# Patient Record
Sex: FEMALE | Race: WHITE | NOT HISPANIC OR LATINO | Employment: FULL TIME | URBAN - METROPOLITAN AREA
[De-identification: names, ages, dates, MRNs, and addresses within clinical notes are randomized per-mention and may not be internally consistent; named-entity substitution may affect disease eponyms.]

---

## 2017-01-03 ENCOUNTER — GENERIC CONVERSION - ENCOUNTER (OUTPATIENT)
Dept: OTHER | Facility: OTHER | Age: 42
End: 2017-01-03

## 2017-01-06 ENCOUNTER — ALLSCRIPTS OFFICE VISIT (OUTPATIENT)
Dept: OTHER | Facility: OTHER | Age: 42
End: 2017-01-06

## 2017-01-09 ENCOUNTER — LAB CONVERSION - ENCOUNTER (OUTPATIENT)
Dept: OTHER | Facility: OTHER | Age: 42
End: 2017-01-09

## 2017-01-09 LAB — ENDOMETRIAL BIOPSY (HISTORICAL): NORMAL

## 2017-01-13 ENCOUNTER — GENERIC CONVERSION - ENCOUNTER (OUTPATIENT)
Dept: OTHER | Facility: OTHER | Age: 42
End: 2017-01-13

## 2017-02-21 ENCOUNTER — TRANSCRIBE ORDERS (OUTPATIENT)
Dept: ADMINISTRATIVE | Facility: HOSPITAL | Age: 42
End: 2017-02-21

## 2017-02-21 DIAGNOSIS — J34.89 ATROPHY OF NASAL TURBINATES: Primary | ICD-10-CM

## 2017-02-23 ENCOUNTER — GENERIC CONVERSION - ENCOUNTER (OUTPATIENT)
Dept: OTHER | Facility: OTHER | Age: 42
End: 2017-02-23

## 2017-02-24 ENCOUNTER — GENERIC CONVERSION - ENCOUNTER (OUTPATIENT)
Dept: OTHER | Facility: OTHER | Age: 42
End: 2017-02-24

## 2017-02-28 ENCOUNTER — HOSPITAL ENCOUNTER (OUTPATIENT)
Dept: CT IMAGING | Facility: HOSPITAL | Age: 42
Discharge: HOME | End: 2017-02-28
Payer: COMMERCIAL

## 2017-02-28 DIAGNOSIS — J34.89 ATROPHY OF NASAL TURBINATES: ICD-10-CM

## 2017-02-28 PROCEDURE — 70486 CT MAXILLOFACIAL W/O DYE: CPT

## 2017-03-01 DIAGNOSIS — N83.201 CYST OF RIGHT OVARY: ICD-10-CM

## 2017-03-07 ENCOUNTER — ALLSCRIPTS OFFICE VISIT (OUTPATIENT)
Dept: OTHER | Facility: OTHER | Age: 42
End: 2017-03-07

## 2017-03-21 ENCOUNTER — HOSPITAL ENCOUNTER (OUTPATIENT)
Dept: MAMMOGRAPHY | Facility: MEDICAL CENTER | Age: 42
Discharge: HOME | End: 2017-03-21
Payer: COMMERCIAL

## 2017-03-21 DIAGNOSIS — C50.312 MALIGNANT NEOPLASM OF LOWER-INNER QUADRANT OF LEFT FEMALE BREAST (HCC): ICD-10-CM

## 2017-03-21 DIAGNOSIS — Z12.31 ENCOUNTER FOR SCREENING MAMMOGRAM FOR MALIGNANT NEOPLASM OF BREAST: ICD-10-CM

## 2017-03-21 PROCEDURE — G0202 SCR MAMMO BI INCL CAD: HCPCS

## 2017-03-21 PROCEDURE — 77063 BREAST TOMOSYNTHESIS BI: CPT

## 2017-05-13 ENCOUNTER — HOSPITAL ENCOUNTER (OUTPATIENT)
Dept: ULTRASOUND IMAGING | Facility: HOSPITAL | Age: 42
Discharge: HOME | End: 2017-05-13
Attending: OBSTETRICS & GYNECOLOGY
Payer: COMMERCIAL

## 2017-05-13 DIAGNOSIS — N83.201 CYST OF RIGHT OVARY: ICD-10-CM

## 2017-05-13 PROCEDURE — 76856 US EXAM PELVIC COMPLETE: CPT

## 2017-05-13 PROCEDURE — 76830 TRANSVAGINAL US NON-OB: CPT

## 2017-05-30 ENCOUNTER — GENERIC CONVERSION - ENCOUNTER (OUTPATIENT)
Dept: OTHER | Facility: OTHER | Age: 42
End: 2017-05-30

## 2017-06-08 ENCOUNTER — ALLSCRIPTS OFFICE VISIT (OUTPATIENT)
Dept: OTHER | Facility: OTHER | Age: 42
End: 2017-06-08

## 2017-09-05 ENCOUNTER — ALLSCRIPTS OFFICE VISIT (OUTPATIENT)
Dept: OTHER | Facility: OTHER | Age: 42
End: 2017-09-05

## 2017-09-15 ENCOUNTER — APPOINTMENT (OUTPATIENT)
Dept: RADIATION ONCOLOGY | Facility: HOSPITAL | Age: 42
End: 2017-09-15
Attending: RADIOLOGY
Payer: COMMERCIAL

## 2017-09-15 ENCOUNTER — GENERIC CONVERSION - ENCOUNTER (OUTPATIENT)
Dept: OTHER | Facility: OTHER | Age: 42
End: 2017-09-15

## 2017-09-15 PROCEDURE — G0463 HOSPITAL OUTPT CLINIC VISIT: HCPCS | Performed by: RADIOLOGY

## 2017-09-15 PROCEDURE — 99214 OFFICE O/P EST MOD 30 MIN: CPT | Performed by: RADIOLOGY

## 2017-12-15 ENCOUNTER — ALLSCRIPTS OFFICE VISIT (OUTPATIENT)
Dept: OTHER | Facility: OTHER | Age: 42
End: 2017-12-15

## 2017-12-15 DIAGNOSIS — N83.201 CYST OF RIGHT OVARY: ICD-10-CM

## 2017-12-16 NOTE — PROGRESS NOTES
Assessment  1  Encounter for gynecological examination without abnormal finding (V72 31) (Z01 419)  2  Right ovarian cyst (620 2) (N83 201)  3  Malignant neoplasm of lower-inner quadrant of left female breast (174 3) (C50 312)    Plan  Right ovarian cyst    · * US PELVIS COMPLETE (TRANSABDOMINAL AND TRANSVAGINAL); Status:Hold For -Scheduling,Retrospective By Protocol Authorization; Requested for:[id number];   Perform:[facility]; Due:67Kbr2094; Last Updated Izella Blade; 12/15/2017 9:39:47 AM;Ordered; For:Right ovarian cyst; Ordered By:Kriner, Erminia Castleman;  Visit for screening mammogram    · MAMMO SCREENING BILATERAL W 3D & CAD; Status:Hold For -Scheduling,Retrospective By Protocol Authorization; Requested XY:[id number]; Perform:[facility]; KEU:[id number]; Last Updated Izella Blade; 12/15/2017 9:05:54 AM;Ordered; For:Visit for screening mammogram; Ordered By:Kriner, Erminia Castleman; Discussion/Summary  health maintenance visit Currently, she eats an adequate diet and has an adequate exercise regimen  cervical cancer screening is needed every three years next cervical cancer screening is due 2018 Breast cancer screening: monthly self breast exam was advised, mammogram has been ordered, mammogram is needed every year and breast cancer screening is managed by Ambrosio Julian  Advice and education were given regarding weight bearing exercise, reproductive health and contraception  Patient discussion: discussed with the patient  Patient returns for annual gyn visit  She has no new medical surgical issues  She has been followed with her medical as well as surgical oncologist and has done well  She was reassured that her menstrual pattern is normal at this point and is consistent with diminished ovarian reserve  Her symptoms of hot flash and night sweats have not been overly bothersome   We will plan on re-evaluating her pelvis for stability of the right ovarian cyst  Follow-up will be via phone patient otherwise return the office in 1 year or to call with concerns related to menses or other symptoms  The patient has the current Goals: Maintain health  The patent has the current Barriers: None  Patient is able to Self-Care  Chief Complaint  annual gyn exam      History of Present Illness  HPI: Patient returns for annual gyn visit  She has no new medical surgical issues  She continues to follow with her management team for breast cancer  There are no alterations in her plan  She has had 3 menses over this last year  Most recent 1 was lighter than the others  She denies any pelvic pain  She does have occasional menopausal symptoms  She has been followed for a right ovarian cyst    GYN , Adult Female [facility]: The patient is being seen for a gynecology evaluation  The last health maintenance visit was 1 year(s) ago  General Health: The patient's health since the last visit is described as good  Lifestyle:  She does not exercise regularly  -- She does not use tobacco  The patient has never smoked cigarettes  -- She consumes alcohol  She reports occasional alcohol use  Reproductive health:  she uses no contraception  -- she is sexually active  She is monogamous with a male partner  Screening: Cervical cancer screening includes a pap smear performed 12/9/16-- and-- human papilloma virus screening performed 12/9/16  Breast cancer screening includes a mammogram performed 3/21/17-- and-- irregular breast self-exams performed  Review of Systems   Constitutional: No fever, no chills, feels well, no tiredness, no recent weight gain or loss  ENT: no ear ache, no loss of hearing, no nosebleeds or nasal discharge, no sore throat or hoarseness  Cardiovascular: no complaints of slow or fast heart rate, no chest pain, no palpitations, no leg claudication or lower extremity edema  Respiratory: no complaints of shortness of breath, no wheezing, no dyspnea on exertion, no orthopnea or PND    Breasts: no complaints of breast pain, breast lump or nipple discharge  Gastrointestinal: no complaints of abdominal pain, no constipation, no nausea or diarrhea, no vomiting, no bloody stools  Genitourinary: as noted in HPI  Musculoskeletal: no complaints of arthralgia, no myalgia, no joint swelling or stiffness, no limb pain or swelling  Integumentary: no complaints of skin rash or lesion, no itching or dry skin, no skin wounds  Neurological: no complaints of headache, no confusion, no numbness or tingling, no dizziness or fainting  OB History  Pregnancy History (Brief):  Prior pregnancies: : 1  Para: 2 (living)  Additional pregnancy history details: 1 multiple births       Active Problems  1  Malignant neoplasm of lower-inner quadrant of left female breast (174 3) (C50 312)  2  Prophylactic use of tamoxifen (V07 51) (Z79 810)  3  Right ovarian cyst (620 2) (N83 201)  4  Seasonal allergies (477 9) (J30 2)  5  Thickened endometrium (793 5) (R93 8)  6  Urinary urgency (788 63) (R39 15)  7   Visit for screening mammogram (V76 12) (Z12 31)    Past Medical History   · Acute sinusitis (461 9) (J01 90)   · History of Age At First Period [de-identified] Years Old (Menarche)   · History of Age At First Pregnancy 29 Years Old   · History of Breast pain, left (611 71) (N64 4)   · History of Encounter for gynecological examination without abnormal finding (V72 31)(Z01 419)   · History Of 1  Previous Pregnancies (V61 5)   · History of allergic rhinitis (V12 69) (Z87 09)   · History of chemotherapy (V87 41) (Z92 21)   · History of pregnancy (V13 29)   · History of radiation therapy (V15 3) (Z92 3)   · History of uterine leiomyoma (V13 29) (Z86 018)   · History of varicella (V12 09) (Z86 19)   · History of Irritable bowel syndrome (564 1) (K58 9)   · History of Nephrolithiasis (V13 01)   · History of Other specified irregular menstruation (626 4) (N92 5)   · History of Previous Pregnancies Resulted In 2  Live Birth(S)   · History of Screening for human papillomavirus (HPV) (V73 81) (Z11 51)   · History of Twin Birth Born In [facility] 72 By  Section (V33 01)   · History of Vasectomy evaluation (V2) (Z30 09)    Surgical History   · History of Biopsy Breast Percutaneous Needle Core   · History of  Section   · History of  Section   · History of Left Breast Lumpectomy   · History of [place] Lymph Node Biopsy    Family History  Sister    · Family history of Hepatitis, B Virus  Paternal Grandfather    · Family history of Prostate Cancer (V16 42)  Family History    · Family history of Hypertension (V17 49)    Social History   · Being A Social Drinker   · Daily Coffee Consumption (2  Cups/Day)   · Marital History - Currently    · Never A Smoker    Current Meds  1  Aleve 220 MG Oral Tablet; TAKE 2 TABLET Daily PRN; Therapy: (Recorded:31Kzd6054) to Recorded  2  Claritin 10 MG Oral Tablet; take 1 tablet daily as needed; Therapy: (Recorded:2017) to Recorded  3  Tamoxifen Citrate 20 MG Oral Tablet; take 1 tablet by mouth every day; Therapy: 74GFQ4159 to (Evaluate:2018)  Requested for: 98Skd4247; Last Rx:77Vil4515 Ordered    Allergies  1  Sulfamethoxazole-TMP DS TABS    Vitals   Recorded: 12IAD6921 12:00XI   Systolic 236, LUE, Sitting   Diastolic 70, LUE, Sitting   Height 5 ft 3 in   Weight 132 lb    BMI Calculated 23 38   BSA Calculated 1 62   LMP 2017     Physical Exam   Constitutional  General appearance: No acute distress, well appearing and well nourished  Neck  Neck: Normal, supple, trachea midline, no masses  Thyroid: Normal, no thyromegaly  Pulmonary  Respiratory effort: No increased work of breathing or signs of respiratory distress  Cardiovascular  Peripheral vascular exam: Normal pulses Throughout  Genitourinary  External genitalia: Normal and no lesions appreciated  Vagina: Normal, no lesions or dryness appreciated     Urethra: Normal    Urethral meatus: Normal    Bladder: Normal, soft, non-tender and no prolapse or masses appreciated  Cervix: Normal, no palpable masses  Uterus: Normal, non-tender, not enlarged, and no palpable masses  Adnexa/parametria: Normal, non-tender and no fullness or masses appreciated  Chest  Breasts: Normal and no dimpling or skin changes noted  Abdomen  Abdomen: Normal, non-tender, and no organomegaly noted  Liver and spleen: No hepatomegaly or splenomegaly  Examination for hernias: No hernias appreciated  Lymphatic  Palpation of lymph nodes in neck, axillae, groin and/or other locations: No lymphadenopathy or masses noted  Skin  Skin and subcutaneous tissue: Normal skin turgor and no rashes  Palpation of skin and subcutaneous tissue: Normal    Psychiatric  Orientation to person, place, and time: Normal    Mood and affect: Normal        Future Appointments    Date/Time Provider Specialty Site   03/06/2018 09:40 AM ADITYA Alonzo   Hematology Oncology [facility] [place]   01/11/2018 08:30 AM Evy Fox MD Surgical Oncology [facility] [address]     Signatures   Electronically signed by : Jimy Haq DO; Dec 15 2017 10:46AM EST                       (Author)

## 2018-01-10 NOTE — PROGRESS NOTES
Assessment    1  Encounter for preventive health examination (V70 0) (Z00 00)   2  Allergic rhinitis (477 9) (J30 9)   3  Recurrent UTI (599 0) (N39 0)    Plan  Allergic rhinitis    · Nasonex 50 MCG/ACT Nasal Suspension; USE 1 SPRAY IN EACH NOSTRIL  ONCE DAILY  Recurrent UTI    · Urine Dip Automated- POC; Status:Complete - Retrospective Authorization;   Done:  08IKC3329 03:08PM    Discussion/Summary  health maintenance visit Currently, she eats a healthy diet and has an adequate exercise regimen  the risks and benefits of cervical cancer screening were discussed cervical cancer screening is current Breast cancer screening: the risks and benefits of breast cancer screening were discussed and mammogram is current  Colorectal cancer screening: colorectal cancer screening is not indicated  Osteoporosis screening: bone mineral density testing is not indicated  The immunizations are up to date  Advice and education were given regarding nutrition and aerobic exercise  Patient discussion: discussed with the patient  Urine dip is normal today  Sinus symptoms - this sounds like allergies  Offered allergy testing  Patient declined today  She will try Nasonex and continue Claritin  Chief Complaint  Patient is here for a well visit to establish  History of Present Illness  , Adult Female: The patient is being seen for a health maintenance evaluation  General Health: The patient's health since the last visit is described as good  She has regular dental visits  She denies vision problems  She denies hearing loss  Immunizations status: up to date  Lifestyle:  She consumes a diverse and healthy diet  She does not have any weight concerns  She exercises regularly  She does not use tobacco  She consumes alcohol  She denies drug use  Reproductive health: the patient is premenopausal   she uses no contraception  pregnancy history:  Screening: cancer screening reviewed and current   Cervical cancer screening includes a pap smear performed last year  Breast cancer screening includes a mammogram performed last year  She hasn't been previously screened for colorectal cancer  metabolic screening reviewed and current  risk screening reviewed and current  HPI: She has a h/o "sinus" problems -- she has never had allergy testing  She has tried Claritin and Flonase which helps sometimes  Her symptoms are worse in the spring and fall  She also gets frequent UTIs - she had one 2 weeks ago at the urgent care  still feels some "grating" feeling down there  Review of Systems    Constitutional: No fever, no chills, feels well, no tiredness, no recent weight gain or weight loss  ENT: as noted in HPI  Cardiovascular: No complaints of slow heart rate, no fast heart rate, no chest pain, no palpitations, no leg claudication, no lower extremity edema  Respiratory: No complaints of shortness of breath, no wheezing, no cough, no SOB on exertion, no orthopnea, no PND  Gastrointestinal: No complaints of abdominal pain, no constipation, no nausea or vomiting, no diarrhea, no bloody stools  Genitourinary: as noted in HPI  Musculoskeletal: No complaints of arthralgias, no myalgias, no joint swelling or stiffness, no limb pain or swelling  Neurological: No complaints of headache, no confusion, no convulsions, no numbness, no dizziness or fainting, no tingling, no limb weakness, no difficulty walking  Psychiatric: Not suicidal, no sleep disturbance, no anxiety or depression, no change in personality, no emotional problems  Endocrine: No complaints of proptosis, no hot flashes, no muscle weakness, no deepening of the voice, no feelings of weakness  Hematologic/Lymphatic: No complaints of swollen glands, no swollen glands in the neck, does not bleed easily, does not bruise easily  Active Problems    1  Breast pain, left (091 18) (N64 4)   2   Encounter for gynecological examination without abnormal finding (V72 31) (Z01 419)   3  History of pregnancy (V13 29)   4  Malignant neoplasm of lower-inner quadrant of left female breast (174 3) (C50 312)    Past Medical History    · History of Age At First Period [de-identified] Years Old (Menarche)   · History of Age At First Pregnancy 29 Years Old   · History Of 1  Previous Pregnancies (V61 5)   · History of chemotherapy (V87 41) (Z92 21)   · History of pregnancy (V13 29)   · History of pregnancy (V13 29)   · History of radiation therapy (V15 3) (Z92 3)   · History of uterine leiomyoma (V13 29) (Z86 018)   · History of varicella (V12 09) (Z86 19)   · History of Irritable bowel syndrome (564 1) (K58 9)   · History of Nephrolithiasis (V13 01)   · History of Previous Pregnancies Resulted In 2  Live Birth(S)   · History of Twin Birth Born In Hospital Delivered By  Section (V33 01)    Surgical History    · History of Biopsy Breast Percutaneous Needle Core   · History of  Section   · History of  Section   · History of Left Breast Lumpectomy   · History of [place] Lymph Node Biopsy    Family History    · Family history of Hepatitis, B Virus    · Family history of Prostate Cancer (V16 42)    · Family history of Hypertension (V17 49)    Social History    · Being A Social Drinker   · Daily Coffee Consumption (2  Cups/Day)   · Marital History - Currently    · Never A Smoker    Current Meds   1  Tamoxifen Citrate 20 MG Oral Tablet; TAKE 1 TABLET DAILY; Therapy: 99HCL7843 to (Evaluate:2016)  Requested for: 18ZAF5378; Last   Rx:2015 Ordered    Allergies    1  Sulfamethoxazole-TMP DS TABS    Vitals   Recorded: 31IWC1827 02:37PM   Temperature 98 1 F   Heart Rate 86   Systolic 921   Diastolic 72   Height 5 ft 3 in   Weight 132 lb 3 2 oz   BMI Calculated 23 42   BSA Calculated 1 63   O2 Saturation 100     Physical Exam    Constitutional   General appearance: No acute distress, well appearing and well nourished      Eyes   Conjunctiva and lids: No swelling, erythema or discharge  Pupils and irises: Equal, round, reactive to light  Ears, Nose, Mouth, and Throat   Otoscopic examination: Tympanic membranes translucent with normal light reflex  Canals patent without erythema  Nasal mucosa, septum, and turbinates: Abnormal   The bilateral nasal mucosa was edematous  Lips, teeth, and gums: Normal, good dentition  Oropharynx: Normal with no erythema, edema, exudate or lesions  Neck   Neck: Supple, symmetric, trachea midline, no masses  Pulmonary   Respiratory effort: No increased work of breathing or signs of respiratory distress  Auscultation of lungs: Clear to auscultation  Cardiovascular   Auscultation of heart: Normal rate and rhythm, normal S1 and S2, no murmurs  Examination of extremities for edema and/or varicosities: Normal     Abdomen   Abdomen: Non-tender, no masses  Lymphatic   Palpation of lymph nodes in neck: No lymphadenopathy  Musculoskeletal   Gait and station: Normal     Skin   Skin and subcutaneous tissue: Normal without rashes or lesions  Psychiatric   Judgment and insight: Normal     Mood and affect: Normal        Results/Data  PHQ-9 Adult Depression Screening 36BNA5156 03:11PM User, Christiano     Test Name Result Flag Reference   PHQ-9 Adult Depression Score 0     Q1: 0, Q2: 0, Q3: 0, Q4: 0, Q5: 0, Q6: 0, Q7: 0, Q8: 0, Q9: 0   PHQ-9 Adult Depression Screening Negative     PHQ-9 Difficulty Level Not difficult at all     PHQ-9 Severity No Depression       Urine Dip Automated- POC 07CXB3243 03:08PM Modestajagdeep Veronique     Test Name Result Flag Reference   Leukocytes NEG     Nitrite NEG     Blood TRACE     Bilirubin NEG     Urobilinogen 0 2     Protein NEG     Ph 6 0     Specific Gravity 1 020     Ketone NEG     Glucose NEG         Health Management  History of Screening for human papillomavirus (HPV)   (1) THIN PREP PAP FOLLOW UP WITH IMAGING; every 3 years; Last 13QJB5103; Next  Due: 29AVD7463;  Active    Future Appointments    Date/Time Provider Specialty Site   11/25/2016 08:40 AM Charles Troy DO Obstetrics/Gynecology [facility]   08/17/2016 08:00 AM ADITYA Deleon   Hematology Oncology [facility] [place]   04/04/2016 08:15 AM Elvin Chambers MD Surgical Oncology [facility]     Signatures   Electronically signed by : Geovanny Angeles MD; Mar 11 2016  3:31PM EST                       (Author)

## 2018-01-11 ENCOUNTER — GENERIC CONVERSION - ENCOUNTER (OUTPATIENT)
Dept: OTHER | Facility: OTHER | Age: 43
End: 2018-01-11

## 2018-01-11 ENCOUNTER — TRANSCRIBE ORDERS (OUTPATIENT)
Dept: ADMINISTRATIVE | Facility: HOSPITAL | Age: 43
End: 2018-01-11

## 2018-01-11 DIAGNOSIS — C50.011 MALIGNANT NEOPLASM OF NIPPLE OF RIGHT BREAST IN FEMALE, UNSPECIFIED ESTROGEN RECEPTOR STATUS (HCC): Primary | ICD-10-CM

## 2018-01-11 NOTE — MISCELLANEOUS
Message   Recorded as Task   Date: 05/20/2016 02:15 PM, Created By: Isabela Moncada   Task Name: Follow Up   Assigned To: Darell Linda   Regarding Patient: Lukas Chaudhry, Status: In Progress   Comment:    Suzanne Thomas - 20 May 2016 2:15 PM     TASK CREATED  pt called after speaking to [facility] 05/19    re u/s lm for pt tcb    [phone]  Jaydon Mateo also reported that per rk's req, has noticed some brt red disch   Suzanne Thomas - 20 May 2016 2:16 PM     TASK IN PROGRESS   Suzanne Thomas - 20 May 2016 3:04 PM     TASK EDITED  spoke with pt, states her u/s is sched for next week, but was to let rk know if she had any red bleeding    said discharge is mostly brn, but did notice jose brt red disch today        Active Problems    1  Allergic rhinitis (477 9) (J30 9)   2  History of pregnancy (V13 29)   3  Malignant neoplasm of lower-inner quadrant of left female breast (174 3) (C50 312)   4  Other specified irregular menstruation (626 4) (N92 5)   5  Recurrent UTI (599 0) (N39 0)    Current Meds   1  Nasonex 50 MCG/ACT Nasal Suspension (Mometasone Furoate); USE 1 SPRAY IN   EACH NOSTRIL ONCE DAILY; Therapy: 51TCU4548 to (Last Rx:11Mar2016)  Requested for: 33BFK2731 Ordered   2  Tamoxifen Citrate 20 MG Oral Tablet; TAKE 1 TABLET DAILY; Therapy: 67AOK2516 to (Evaluate:13Jun2016)  Requested for: 29SJP9106; Last   Rx:16Nov2015 Ordered    Allergies    1   Sulfamethoxazole-TMP DS TABS    Signatures   Electronically signed by : Ermias Moreland, ; May 20 2016  3:04PM EST                       (Author)

## 2018-01-11 NOTE — MISCELLANEOUS
Message  Reviewed pelvic u/s results  Has no abnl bleeding or pelvic pain  Will plan repeat pelvic u/s 6 months(after OV)      Results/Data     * US PELVIS COMPLETE (TRANSABDOMINAL AND TRANSVAGINAL)   US PELVIS COMPLETE (TRANSABDOMINAL AND TRANSVAGINAL): PELVIC  ULTRASOUND, COMPLETE     INDICATION: Ovarian cyst on the right side                COMPARISON: December 28, 2016     TECHNIQUE:   Transabdominal pelvic ultrasound was performed in sagittal and  transverse planes with a curvilinear transducer  Additional transvaginal imaging was  performed to better evaluate the endometrium and ovaries  Imaging included  volumetric    sweeps as well as traditional still imaging technique  FINDINGS:     UTERUS:   The uterus is anteverted in position, measuring 9 5 x 4 6 x 5 9 cm  Contour and echotexture appear normal        The cervix shows no suspicious abnormality  ENDOMETRIUM:     The endometrial thickness measures about 1 5 cm related the endometrium is not so  well evaluated     OVARIES/ADNEXA:   Right ovary:  4 5 x 3 1 x 2 8 cm  On the previous study the a septated the cyst was noted within the right ovary which  measured 4 9 x 2 7 x 2 9 cm corresponding to the abnormality noted on the prior study  there is a septated cyst in the right ovary which measures 4 5 x 2 4 x 2 7 cm  Left ovary:  2 8 x 1 1 x 1 8 cm  No suspicious left ovarian abnormality  Doppler flow within normal limits  No suspicious adnexal mass or loculated collections  There is no free fluid  IMPRESSION:       The endometrial thickness is 1 5 cm  Evaluation of the endometrium is mildly limited as  compared to the previous study due to poor acoustic window  Mildly septated cyst which was seen on the previous study in the right ovary now  measures about 4 5 x 2 4 x 2 7 cm  On the previous study this was measuring 4 9 x 2 7 x  2 9 cm     This may represent two small cysts lying juxtaposed to each other or a single septated lesion  This appears mildly less prominent as compared to previous  study  The left adnexal cyst seen on the previous study is not identified  Further management  to BE based on clinical evaluation  Further management to BE based on clinical evaluation           Workstation performed: [id number]     Signed by:   Gumaro Perez MD   5/16/17     Signatures   Electronically signed by : Timothy Dominguez DO; May 30 2017  4:57PM EST                       (Author)

## 2018-01-11 NOTE — MISCELLANEOUS
Message  Patient called regarding scheduling of yearly MRI and was wondering if she still needed to have them done every year  Reviewed with Dr Samuel Ratliff who reviewed with Dr Davidson Friday who believes that MRI's every other year with yearly 3D mammogram would be a better way to follow her  Left message on her cell phone with the above  Active Problems    1  Allergic rhinitis (477 9) (J30 9)   2  History of pregnancy (V13 29)   3  Malignant neoplasm of lower-inner quadrant of left female breast (174 3) (C50 312)   4  Other specified irregular menstruation (626 4) (N92 5)   5  Recurrent UTI (599 0) (N39 0)    Current Meds   1  Nasonex 50 MCG/ACT Nasal Suspension (Mometasone Furoate); USE 1 SPRAY IN   EACH NOSTRIL ONCE DAILY; Therapy: 80ZYZ4926 to (Last Rx:11Mar2016)  Requested for: 43AUI1480 Ordered   2  Tamoxifen Citrate 20 MG Oral Tablet; TAKE 1 TABLET DAILY; Therapy: 91IDN4780 to (Evaluate:09Jan2017)  Requested for: 98WKK0582; Last   Rx:13Jun2016 Ordered    Allergies    1   Sulfamethoxazole-TMP DS TABS    Signatures   Electronically signed by : Dora Keller MD; Jun 16 2016  1:14PM EST

## 2018-01-12 VITALS
HEIGHT: 63 IN | TEMPERATURE: 97.7 F | OXYGEN SATURATION: 99 % | DIASTOLIC BLOOD PRESSURE: 72 MMHG | RESPIRATION RATE: 16 BRPM | SYSTOLIC BLOOD PRESSURE: 110 MMHG | HEART RATE: 86 BPM | BODY MASS INDEX: 24.27 KG/M2 | WEIGHT: 137 LBS

## 2018-01-12 NOTE — MISCELLANEOUS
Message   Recorded as Task   Date: 02/23/2017 12:32 PM, Created By: Sebas Fox   Task Name: Call Back   Assigned To: Kathie Dean   Regarding Patient: Kanika Tapia, Status: Active   Comment:    Sebas Fox - 23 Feb 2017 12:32 PM     TASK CREATED  Pt has not had menses in 6 mos  Just had eb and it was neg  Got period Tues  pm and has been bleeding thru a pad and tampon every her since then  Might even be heavier today  She is at work and sits for her job  I told her to take an Aleve now and I would get back to her  Thanks   Sebas Fox - 23 Feb 2017 12:33 PM     TASK EDITED  Her # [phone]   Rama Ramos - 23 Feb 2017 12:42 PM     TASK REPLIED TO: Previously Assigned To Rama Ramos  Any dizziness or lightheadedness? Soaking a pad and tampon every hour for several now? If she is having symptoms of anemia she should be seen in ED  I'm not sure what our options are with hormonal agents to help with her breast ca history on tamoxifen  Will have to do some research, or phone a friend  Sebas Fox - 23 Feb 2017 1:12 PM     TASK EDITED  no dizziness or lightheadedness  Does feel weak  Sorry for not asking   Sebas Fox - 23 Feb 2017 1:41 PM     TASK EDITED  Pt will go home from work  If any dizziness, etc - to call CT and go to ED   If bleeding increases, to go to ED  If all stable and opt ok - to call Elizabeth Traore in [facility] and she will have pt see (or discuss all) with RK  Pt aware  Will take aleve q 12 hrs        Active Problems    1  Allergic rhinitis (477 9) (J30 9)   2  Encounter for gynecological examination without abnormal finding (V72 31) (Z01 419)   3  Malignant neoplasm of lower-inner quadrant of left female breast (174 3) (C50 312)   4  Other specified irregular menstruation (626 4) (N92 5)   5  Right ovarian cyst (620 2) (N83 201)   6  Screening for human papillomavirus (HPV) (V73 81) (Z11 51)   7  Thickened endometrium (793 5) (R93 8)   8   Urinary urgency (788 63) (R39 15)   9  Vasectomy evaluation (V25 09) (Z30 09)   10  Visit for screening mammogram (V76 12) (Z12 31)    Current Meds   1  Aleve 220 MG Oral Tablet; TAKE 2 TABLET Daily PRN; Therapy: (Recorded:03Czp9824) to Recorded   2  Mometasone Furoate 50 MCG/ACT Nasal Suspension; USE 1 SPRAY IN EACH   NOSTRIL ONCE A DAY; Therapy: 88HIE3511 to (Evaluate:18Mar2017)  Requested for: 74GMQ2776; Last   Rx:17Jan2017 Ordered   3  Nasonex 50 MCG/ACT Nasal Suspension (Mometasone Furoate); USE 1 SPRAY IN   EACH NOSTRIL ONCE DAILY; Therapy: 72MOG1128 to (Last Rx:11Mar2016)  Requested for: 68QUG9715 Ordered   4  Tamoxifen Citrate 20 MG Oral Tablet; take one tablet by mouth every day; Therapy: 79PPR6582 to (Evaluate:90Vyd3271)  Requested for: 48IMN7489; Last   Rx:16Jan2017 Ordered    Allergies    1  Sulfamethoxazole-TMP DS TABS    Signatures   Electronically signed by :  Johan Boateng, ; Feb 23 2017  1:41PM EST                       (Author)

## 2018-01-13 VITALS
WEIGHT: 133 LBS | SYSTOLIC BLOOD PRESSURE: 122 MMHG | RESPIRATION RATE: 14 BRPM | DIASTOLIC BLOOD PRESSURE: 82 MMHG | OXYGEN SATURATION: 99 % | HEART RATE: 98 BPM | TEMPERATURE: 97 F | BODY MASS INDEX: 23.57 KG/M2 | HEIGHT: 63 IN

## 2018-01-13 VITALS
BODY MASS INDEX: 23.21 KG/M2 | TEMPERATURE: 98.1 F | DIASTOLIC BLOOD PRESSURE: 60 MMHG | SYSTOLIC BLOOD PRESSURE: 98 MMHG | HEART RATE: 84 BPM | RESPIRATION RATE: 16 BRPM | HEIGHT: 63 IN | WEIGHT: 131 LBS

## 2018-01-13 NOTE — MISCELLANEOUS
Message   Recorded as Task   Date: 05/16/2016 02:16 PM, Created By: Venita Adorno   Task Name: Follow Up   Assigned To: Fatou Canales   Regarding Patient: Kristyn Flynn, Status: In Progress   Comment:    Venita Adorno - 69 May 2016 2:16 PM     TASK CREATED  Pt called again about cramping  Venita Adorno - 75 May 2016 2:37 PM     TASK IN PROGRESS   Venita Adorno - 16 May 2016 2:42 PM     TASK EDITED  Pt spoke with you end of april  She had no cramps for 1 week and then on May 6 - light bleeding  Bled heavily on 7th to 9th - stopped 5/13  Now has cramps again - no bleeding  Period ff chemo?  [phone]   Venita Adorno - 18 May 2016 10:19 AM     TASK EDITED  Still light bleeding today, and has "unusual " cramps  Feels like someone squeezing uterus  Everything feels very tight in pelvis  Once bleeding lightened up - unusual cramps began and persisted  Venita Adorno - 60 May 2016 10:20 AM     TASK EDITED  She can be reached at [facility] [phone]   [facility] - 19 May 2016 2:51 PM     TASK REPLIED TO: Previously Assigned To Kirill Batista  please schedule pelvic u/s   pain  Venita Adorno - 82 May 2016 3:05 PM     TASK EDITED  Pt will call [facility] for u/s - slip to [facility] wg for u/s        Active Problems    1  Allergic rhinitis (477 9) (J30 9)   2  History of pregnancy (V13 29)   3  Malignant neoplasm of lower-inner quadrant of left female breast (174 3) (C50 312)   4  Other specified irregular menstruation (626 4) (N92 5)   5  Recurrent UTI (599 0) (N39 0)    Current Meds   1  Nasonex 50 MCG/ACT Nasal Suspension (Mometasone Furoate); USE 1 SPRAY IN   EACH NOSTRIL ONCE DAILY; Therapy: 42QSA0634 to (Last Rx:11Mar2016)  Requested for: 18PFA3243 Ordered   2  Tamoxifen Citrate 20 MG Oral Tablet; TAKE 1 TABLET DAILY; Therapy: 40HPF3213 to (Evaluate:13Jun2016)  Requested for: 53YSL8251; Last   Rx:16Nov2015 Ordered    Allergies    1   Sulfamethoxazole-TMP DS TABS    Plan  Other specified irregular menstruation    · * US PELVIS COMPLETE (TRANSABDOMINAL AND TRANSVAGINAL); Status:Hold For -  Associated Content; Requested for:55Git7501;     Signatures   Electronically signed by :  Amandeep Boateng, ; May 19 2016  3:05PM EST                       (Author)

## 2018-01-13 NOTE — MISCELLANEOUS
Message      Recorded as Task   Date: 05/16/2016 02:16 PM, Created By: Ella Karimi   Task Name: Follow Up   Assigned To: Calvin Simmons   Regarding Patient: Kp Miller, Status: In Progress   Comment:    Ella Sachi - 64 May 2016 2:16 PM     TASK CREATED  Pt called again about cramping  [place] Sachi - 39 May 2016 2:37 PM     TASK IN PROGRESS   Ella Karimi - 16 May 2016 2:42 PM     TASK EDITED  Pt spoke with you end of april  She had no cramps for 1 week and then on May 6 - light bleeding  Bled heavily on 7th to 9th - stopped 5/13  Now has cramps again - no bleeding  Period ff chemo?  [phone]   Ella Karimi - 18 May 2016 10:19 AM     TASK EDITED  Still light bleeding today, and has "unusual " cramps  Feels like someone squeezing uterus  Everything feels very tight in pelvis  Once bleeding lightened up - unusual cramps began and persisted  Ella Karimi - 03 May 2016 10:20 AM     TASK EDITED  She can be reached at [phone]   Spoke with pt  Had resumption of menses  Had 4-5 days moderate(4tampons/day) flow  Followed by slight red-brown spotting  Pelvic cramping different this time  "Worried about endometrial CA"  Will check pelvic u/s  Plan to observe cycles as this is likely resumption of her pattern  Low threshold for EB if any change  Pt reassured        Signatures   Electronically signed by : Alex Scott DO; May 19 2016  2:50PM EST                       (Author)

## 2018-01-14 VITALS
SYSTOLIC BLOOD PRESSURE: 110 MMHG | HEART RATE: 83 BPM | DIASTOLIC BLOOD PRESSURE: 74 MMHG | WEIGHT: 132.13 LBS | OXYGEN SATURATION: 99 % | HEIGHT: 63 IN | RESPIRATION RATE: 16 BRPM | TEMPERATURE: 99.1 F | BODY MASS INDEX: 23.41 KG/M2

## 2018-01-14 VITALS
BODY MASS INDEX: 24.45 KG/M2 | HEIGHT: 63 IN | WEIGHT: 138 LBS | SYSTOLIC BLOOD PRESSURE: 118 MMHG | DIASTOLIC BLOOD PRESSURE: 72 MMHG

## 2018-01-14 NOTE — MISCELLANEOUS
Message  Spoke with pt   6 weeks h/o midline cramping  Not overly bothersome  Had UTI   treated, followup UA WNL  No menses for 7-8 months  Will observe for now  Advised NSAIDs  To call in 2 weeks if no resolution  Signatures   Electronically signed by : Colton Webb DO;  Apr 27 2016  1:01PM EST                       (Author)

## 2018-01-14 NOTE — MISCELLANEOUS
Message  Spoke w/ pt re: EB results  No evidence of hyperplasia or malignancy  Pt reassured  Had minimal spotting post-EB  Will plan to repeat pelvic u/s 3-4 months(RX will be sent)  Pt to call me with excessive bleeding, pain or with any concerns        Results/Data     (B) ENDOMETRIAL BIOPSY   ENDOMETRIAL BIOPSY: BENIGN     Signatures   Electronically signed by : Rajani Griffith DO; Jan 13 2017  6:01PM EST                       (Author)

## 2018-01-14 NOTE — RESULT NOTES
Message   negative urine cx  no infection     Verified Results  (1) URINE CULTURE [id number] 05:49PM [facility] Order Number: [id number]     Test Name Result Flag Reference   CLINICAL REPORT (Report)     Test:        Urine culture  Specimen Type:   Urine  Specimen Date:   11/14/2016 5:49 PM  Result Date:    11/15/2016 5:27 PM  Result Status:   Final result  Resulting Lab:   [place] [address]            Tel: [phone]      CULTURE                                       ------------------                                   <10,000 cfu/ml Mixed Contaminants X3

## 2018-01-14 NOTE — MISCELLANEOUS
Message   Recorded as Task   Date: 05/20/2016 02:15 PM, Created By: [facility]   Task Name: Follow Up   Assigned To: Marcelle Carbajal   Regarding Patient: Geo Dallas, Status: In Progress   Comment:    Suzanne Thomas - 20 May 2016 2:15 PM     TASK CREATED  pt called after speaking to [facility] 05/19    re u/s lm for pt tcb    [phone]  Farnaz Lighttorie also reported that per rk's req, has noticed some brt red disch   Abigail Thomasne - 20 May 2016 2:16 PM     TASK IN PROGRESS   WilliamSuzanne - 20 May 2016 3:04 PM     TASK EDITED  spoke with pt, states her u/s is sched for next week, but was to let rk know if she had any red bleeding    said discharge is mostly brn, but did notice jose brt red disch today   WilliamSuzanne - 20 May 2016 3:05 PM     TASK REASSIGNED: Previously Assigned To ANTONINO GYN,Team  fyi to Ronie Dakins - 20 May 2016 3:05 PM     TASK REASSIGNED: Previously Assigned To Deepak Augustin - 20 May 2016 3:34 PM     TASK REPLIED TO: Previously Assigned To [place]  ok to await u/s results        Active Problems    1  Allergic rhinitis (477 9) (J30 9)   2  History of pregnancy (V13 29)   3  Malignant neoplasm of lower-inner quadrant of left female breast (174 3) (C50 312)   4  Other specified irregular menstruation (626 4) (N92 5)   5  Recurrent UTI (599 0) (N39 0)    Current Meds   1  Nasonex 50 MCG/ACT Nasal Suspension (Mometasone Furoate); USE 1 SPRAY IN   EACH NOSTRIL ONCE DAILY; Therapy: 04MNA6637 to (Last Rx:11Mar2016)  Requested for: 92EGI9984 Ordered   2  Tamoxifen Citrate 20 MG Oral Tablet; TAKE 1 TABLET DAILY; Therapy: 80UIH0834 to (Evaluate:13Jun2016)  Requested for: 19KOW6148; Last   Rx:16Nov2015 Ordered    Allergies    1  Sulfamethoxazole-TMP DS TABS    Signatures   Electronically signed by :  Kavon Boateng, ; May 23 2016  9:29AM EST                       (Author)

## 2018-01-14 NOTE — MISCELLANEOUS
Message  Spoke with pt   has resumed several menstrual cycles of 26-28 days, normal flow(2 tampons q D)  Reviewed recent pelvic U/S  Impossible to adequately assess status of endometrium given changes with menstrual pattern  Denies pain on right  Discussed implication of right ovarian cyst   Will plan repeat pelvic u/s early 7/16 while just after onset of menses  F/U via phone  If increased pain, or AUB, may plan earlier eval or EB  Pt agrees with plan  Results/Data  Results    * US PELVIS COMPLETE (TRANSABDOMINAL AND TRANSVAGINAL)   US PELVIS COMPLETE (TRANSABDOMINAL AND TRANSVAGINAL): PELVIC  ULTRASOUND, COMPLETE     INDICATION: Other specified irregular menstruation  Cramping and irregular bleeding  Absent menses for several years due to chemotherapy for breast carcinoma  COMPARISON: Pelvic ultrasound August 7, 2015     TECHNIQUE:   Transabdominal pelvic ultrasound was performed in sagittal and  transverse planes with a curvilinear transducer  Additional transvaginal imaging was  performed to better evaluate the endometrium and ovaries  Imaging included  volumetric    sweeps as well as traditional still imaging technique  FINDINGS:     UTERUS:   The uterus is anteverted in position, measuring 9 2 x 4 6 x 6 7 cm  Uterus heterogeneous in echotexture and bulbous in configuration  The cervix shows no suspicious abnormality  ENDOMETRIUM:     Diffusely thickened, measuring 25 mm  Diffusely thickened, heterogeneous and irregular appearing endometrium  Margins are  diffusely indistinct  Abnormal flow  OVARIES/ADNEXA:   Right ovary:  7 7 x 4 8 x 8 6 cm  Dilated tubular fluid-filled structure in the adnexa, extending to the ovary  Multiple simple  cysts, largest 4 7 x 5 4 cm  Doppler flow within normal limits  Left ovary:  1 9 x 1 3 x 1 1 cm  No suspicious left ovarian abnormality  Doppler flow within normal limits       No suspicious adnexal mass or loculated collections  There is no free fluid  IMPRESSION:   1  Diffusely abnormal appearing endometrium  The overall thickness has increased  from the prior study  Although the findings may be related to the patient's history  of tamoxifen use, given the history of abnormal vaginal bleeding, endometrial biopsy    is recommended to rule out the presence of endometrial neoplasm  2   Multiple right ovarian cysts, largest of which is simple  Follow-up sonography in 6-12  weeks is recommended for further evaluation  The above findings will be conveyed by the radiology liaison at time of dictation           ##imslh##imslh       Workstation performed: [id number]     Signed by:   Shaquille See DO   5/31/16     Signatures   Electronically signed by : Tova Arias DO; Jun 6 2016  5:09PM EST                       (Author)

## 2018-01-15 NOTE — MISCELLANEOUS
Message  Spoke w/ pt  Had onset of menses 3 days ago   heavy  First menses since 7/16  Had to leave work due to flow  Now with light flow  Had normal EB last month  Will continue to observe  Discussed use of aleve prior to onset of menses  To call with concerns        Signatures   Electronically signed by : Claude Henry DO; Feb 24 2017  4:38PM EST                       (Author)

## 2018-01-15 NOTE — MISCELLANEOUS
Message   Recorded as Task   Date: 06/02/2016 03:30 PM, Created By: Manuelita Gitelman   Task Name: Medical Complaint Callback   Assigned To: Silvia Ramirez   Regarding Patient: Camron Reyes, Status: In Progress   Comment:    Manuelita Gitelman - 23 Jun 2016 3:30 PM     TASK CREATED  Pt wants u/s result  Does not look normal to me  Pt said she stopped bleeding for a week and began cramping and bleeding as of yesterday  Her # is [phone]  Thanks   Kirill Batista - 03 Jun 2016 8:52 AM     TASK REPLIED TO: Previously Assigned To Jackalyn Sandifer  premenopausal  looks OK  John Radha John Radha I will call her   Manuelita Gitelman - 06 Jun 2016 8:02 AM     TASK IN PROGRESS   Manuelita Gitelman - 06 Jun 2016 8:03 AM     TASK EDITED  Do you want a f/u u/s in 6 weeks? I can send slip? Jackalyn Sandifer - 06 Jun 2016 5:10 PM     TASK REPLIED TO: Previously Assigned To Jackalyn Sandifer  please schedule repeat pelvic u/s first week of July with onset of next menses   thank you   Manuelita Gitelman - 08 Jun 2016 7:52 AM     TASK EDITED   Manuelita Gitelman - 08 Jun 2016 8:27 AM     TASK EDITED  Slip sent to pt for pelvic u/s early july with menses  I lm of this for pt        Active Problems    1  Allergic rhinitis (477 9) (J30 9)   2  History of pregnancy (V13 29)   3  Malignant neoplasm of lower-inner quadrant of left female breast (174 3) (C50 312)   4  Other specified irregular menstruation (626 4) (N92 5)   5  Recurrent UTI (599 0) (N39 0)    Current Meds   1  Nasonex 50 MCG/ACT Nasal Suspension (Mometasone Furoate); USE 1 SPRAY IN   EACH NOSTRIL ONCE DAILY; Therapy: 80GWP8998 to (Last Rx:11Mar2016)  Requested for: 82LEG0756 Ordered   2  Tamoxifen Citrate 20 MG Oral Tablet; TAKE 1 TABLET DAILY; Therapy: 15WTL0765 to (Evaluate:13Jun2016)  Requested for: 27ACE7248; Last   Rx:16Nov2015 Ordered    Allergies    1   Sulfamethoxazole-TMP DS TABS    Plan  Other specified irregular menstruation    · US PELVIS COMPLETE (TRANSABDOMINAL AND TRANSVAGINAL); Status:Complete -  Retrospective Authorization;   Done: 22MJN8623    Signatures   Electronically signed by :  Marilin Boateng, ; Jun 8 2016  8:27AM EST                       (Author)

## 2018-01-16 NOTE — MISCELLANEOUS
Message   Recorded as Task   Date: 07/26/2016 02:42 PM, Created By: Mar Quezada   Task Name: Follow Up   Assigned To: Marcelle Carbajal   Regarding Patient: Geo Dallas, Status: In Progress   Comment:    Mabel,Jan - 26 Jul 2016 2:42 PM     TASK CREATED  hi dr Flynn Ayoub    pt Emily Anger esopo 1975  called stating "she has not had her period since her last u s  in may"  states "has had cramping & some brown bld, but not a real period"  her question is    do you want her to schedule another pelvic u s , or wait until she gets her period? please advise   thanks, please task [facility] - 27 Jul 2016 4:01 PM     TASK REPLIED TO: Previously Assigned To ANTONINO GYN,Team   OK to observe at this time  Farnaz Lightning Farnaz Lightning I expect irregular spotting and/or menses given return of ovarian function   Nirmala Mcelroy - 27 Jul 2016 4:05 PM     TASK IN PROGRESS   Barrera Goodman - 27 Jul 2016 4:07 PM     TASK EDITED  lm making patient aware  Active Problems    1  Allergic rhinitis (477 9) (J30 9)   2  History of pregnancy (V13 29)   3  Malignant neoplasm of lower-inner quadrant of left female breast (174 3) (C50 312)   4  Other specified irregular menstruation (626 4) (N92 5)   5  Recurrent UTI (599 0) (N39 0)    Current Meds   1  Nasonex 50 MCG/ACT Nasal Suspension (Mometasone Furoate); USE 1 SPRAY IN   EACH NOSTRIL ONCE DAILY; Therapy: 58SCJ7289 to (Last Rx:11Mar2016)  Requested for: 48ZPY3629 Ordered   2  Tamoxifen Citrate 20 MG Oral Tablet; TAKE 1 TABLET DAILY; Therapy: 93UQJ1321 to (Evaluate:09Jan2017)  Requested for: 56MQF4809; Last   Rx:13Jun2016 Ordered    Allergies    1   Sulfamethoxazole-TMP DS TABS    Signatures   Electronically signed by : Richard Leon LPN; Jul 27 4084  0:60YQ EST                       (Author)

## 2018-01-17 NOTE — MISCELLANEOUS
Message  pt called stating "I have not had a regular period since my last pelvic u s  in may " is wondering if she should still schedule her next pelvic u s or wait until her period" dr Jose Maria Kovacs tasked  pt advised- will call with response  Active Problems    1  Allergic rhinitis (477 9) (J30 9)   2  History of pregnancy (V13 29)   3  Malignant neoplasm of lower-inner quadrant of left female breast (174 3) (C50 312)   4  Other specified irregular menstruation (626 4) (N92 5)   5  Recurrent UTI (599 0) (N39 0)    Current Meds   1  Nasonex 50 MCG/ACT Nasal Suspension (Mometasone Furoate); USE 1 SPRAY IN   EACH NOSTRIL ONCE DAILY; Therapy: 54TSL4240 to (Last Rx:11Mar2016)  Requested for: 96XGG9591 Ordered   2  Tamoxifen Citrate 20 MG Oral Tablet; TAKE 1 TABLET DAILY; Therapy: 37XPK2092 to (Evaluate:09Jan2017)  Requested for: 21SGD2523; Last   Rx:13Jun2016 Ordered    Allergies    1   Sulfamethoxazole-TMP DS TABS    Signatures   Electronically signed by : Familia Gates RN; Jul 26 2016  2:45PM EST                       (Author)

## 2018-01-17 NOTE — MISCELLANEOUS
Message  Msg left on pt cell that PAP wnl  Will call when I have pelvic u/s results        Signatures   Electronically signed by : Joshua Loyd DO; Dec 14 2016  4:15PM EST                       (Author)

## 2018-01-17 NOTE — MISCELLANEOUS
Message  Reviewed recent pelvic u/s with pt   discussed continue heterogeneous appearance of endometrium  Pt has not had recent menses  Right adnexal cyst decreased in size; new 3 8cm left adnexal cyst noted  Advised EB  Pt scheduled for this 17 at 1500  Will premedicate w/ naproxen  Discussed need for f/u u/s 3 months  Results/Data     * US PELVIS COMPLETE (TRANSABDOMINAL AND TRANSVAGINAL)   US PELVIS COMPLETE (TRANSABDOMINAL AND TRANSVAGINAL): PELVIC  ULTRASOUND, COMPLETE     INDICATION: History of right ovarian cyst   Follow-up evaluation      Last menstrual period was in   History of breast cancer, currently on tamoxifen           COMPARISON: 2016     TECHNIQUE:   Transabdominal pelvic ultrasound was performed in sagittal and  transverse planes with a curvilinear transducer  Additional transvaginal imaging was  performed to better evaluate the endometrium and ovaries  Imaging included  volumetric    sweeps as well as traditional still imaging technique  FINDINGS:     UTERUS:   The uterus is anteverted in position, measuring 11 1 x 4 9 x 5 8 cm  Contour and echotexture appear normal        The cervix shows no suspicious abnormality  ENDOMETRIUM:     Markedly abnormal heterogeneously thickened 2 5 cm endometrium not changed from  the prior study  OVARIES/ADNEXA:   Right ovary:  5 x 3 2 x 2 9 cm  Complex multiloculated predominantly cystic structure with internal septations in the  right adnexa redemonstrated  It is uncertain whether this represents complex ovarian  cysts or perhaps a dilated fallopian tube  In aggregate this structure in  the    right adnexa measures approximately 4 7 x 2 7 x 2 9 cm slightly smaller than on the  previous study  Vascular flow associated with thin internal septation in this complex cystic mass  Left ovary:  4 7 x 4 9 x 4 5 cm      Complex multiloculated cystic structure in the left adnexa noted, containing internal  septations like the right side  The dominant cystic element measures 3 7 x 3 4  x 3 8 cm  Doppler flow within normal limits  No suspicious adnexal mass or loculated collections  There is no free fluid  IMPRESSION:         1  Abnormal persistently thickened and heterogeneous endometrium in this patient with  a history of breast cancer on tamoxifen  Findings may be related to endometrial  hyperplasia associated with tamoxifen  However endometrial carcinoma should be    excluded  Endometrial biopsy advised  2   Bilateral complex adnexal cystic lesions possibly complex ovarian cysts, similar to  perhaps slightly smaller on the right, more prominent/new on the left  Differential  diagnosis of bilateral hydrosalpinx could be considered  Other mass lesions    including cystic adnexal masses/ovarian tumors not excluded  Contrast enhanced MRI  of the pelvis is advised further characterization  ##sigslh##sigslh     ##fuslh01##fuslh01           Workstation performed: [id number]     Signed by:    Eber Neff MD   12/29/16     Signatures   Electronically signed by : Ryley Sutton DO; Ozzie  3 2017 10:16AM EST                       (Author)

## 2018-01-22 VITALS
DIASTOLIC BLOOD PRESSURE: 70 MMHG | SYSTOLIC BLOOD PRESSURE: 118 MMHG | BODY MASS INDEX: 23.39 KG/M2 | WEIGHT: 132 LBS | HEIGHT: 63 IN

## 2018-01-24 VITALS
HEART RATE: 87 BPM | WEIGHT: 133 LBS | OXYGEN SATURATION: 97 % | TEMPERATURE: 97.6 F | DIASTOLIC BLOOD PRESSURE: 78 MMHG | BODY MASS INDEX: 23.57 KG/M2 | HEIGHT: 63 IN | RESPIRATION RATE: 16 BRPM | SYSTOLIC BLOOD PRESSURE: 120 MMHG

## 2018-02-17 ENCOUNTER — HOSPITAL ENCOUNTER (OUTPATIENT)
Dept: ULTRASOUND IMAGING | Facility: HOSPITAL | Age: 43
Discharge: HOME | End: 2018-02-17
Attending: OBSTETRICS & GYNECOLOGY
Payer: COMMERCIAL

## 2018-02-17 DIAGNOSIS — N83.201 CYST OF RIGHT OVARY: ICD-10-CM

## 2018-02-17 PROCEDURE — 76856 US EXAM PELVIC COMPLETE: CPT

## 2018-02-17 PROCEDURE — 76830 TRANSVAGINAL US NON-OB: CPT

## 2018-03-06 ENCOUNTER — OFFICE VISIT (OUTPATIENT)
Dept: HEMATOLOGY ONCOLOGY | Facility: CLINIC | Age: 43
End: 2018-03-06
Payer: COMMERCIAL

## 2018-03-06 VITALS
TEMPERATURE: 98.1 F | OXYGEN SATURATION: 98 % | DIASTOLIC BLOOD PRESSURE: 80 MMHG | HEART RATE: 81 BPM | HEIGHT: 63 IN | WEIGHT: 132.5 LBS | BODY MASS INDEX: 23.48 KG/M2 | SYSTOLIC BLOOD PRESSURE: 120 MMHG

## 2018-03-06 DIAGNOSIS — Z17.0 MALIGNANT NEOPLASM OF LEFT BREAST IN FEMALE, ESTROGEN RECEPTOR POSITIVE, UNSPECIFIED SITE OF BREAST (HCC): Primary | ICD-10-CM

## 2018-03-06 DIAGNOSIS — C50.912 MALIGNANT NEOPLASM OF LEFT BREAST IN FEMALE, ESTROGEN RECEPTOR POSITIVE, UNSPECIFIED SITE OF BREAST (HCC): Primary | ICD-10-CM

## 2018-03-06 PROCEDURE — 99214 OFFICE O/P EST MOD 30 MIN: CPT | Performed by: INTERNAL MEDICINE

## 2018-03-06 RX ORDER — TAMOXIFEN CITRATE 20 MG/1
20 TABLET ORAL DAILY
Qty: 30 TABLET | Refills: 11 | Status: SHIPPED | OUTPATIENT
Start: 2018-03-06 | End: 2019-02-27 | Stop reason: SDUPTHER

## 2018-03-06 RX ORDER — TAMOXIFEN CITRATE 20 MG/1
20 TABLET ORAL DAILY
Refills: 6 | COMMUNITY
Start: 2018-01-05 | End: 2018-03-06 | Stop reason: SDUPTHER

## 2018-03-06 NOTE — PROGRESS NOTES
Hematology / Oncology Outpatient Follow Up Note    Riley Espinal 40 y o  female :1975 EYK:[id number]         Date:  3/6/2018    Assessment / Plan:  A 40year old premenopausal woman with stage IA left breast cancer, grade 2, ER/NH positive, HER-2 negative disease  Her disease was high risk, based on MammaPrint  She was treated with adjuvant chemotherapy with Taxotere and cyclophosphamide x4 cycles  Currently, she is on tamoxifen without significant side effects  She has no evidence recurrent disease, based on her symptomatology and physical examinations  I recommended her to continue with tamoxifen 20 mg daily  She is aware that you standard care with tamoxifen is 10 years  I will see her again in a year for routine follow-up  Subjective:     HPI:  A 41year old premenopausal woman, who noticed a lump in the medial lower aspect of her left breast in 2013  She brought this to the medical attention  She was found to have abnormality based on imaging study  She underwent ultrasound-guided biopsy in 2013, which showed invasive mammary carcinoma, ER/NH positive, HER-2 negative disease  She also underwent right breast biopsy, as well as second biopsy of dislocation in the left breast  Both of which were negative for malignancy  She underwent lumpectomy with sentinel lymph node biopsy in 2013  Pathology revealed that 1 1 cm of invasive ductal carcinoma, grade 2  There is no evidence of lymphovascular invasion  3 sentinel lymph node were all negative for metastatic disease  This was ER/NH positive, HER-2 negative disease  Her tumor tissue was sent for MammaPrint, which came back recently as high risk disease  Because of her young age, she underwent BRCA1/2 testing which showed no evidence of mutation  She has no family history of breast cancer or ovarian cancer  She presents today for initial consultation regarding adjuvant treatment   She feels well with no complaint of pain  She has no weight change  Recently  She has no respiratory symptoms  She has regular menstrual cycles  She has no other comorbidities  Interval History:  A 28-year-old premenopausal woman, who has no evidence of BRCA mutation  She was diagnosed with stage IA left breast cancer, grade 2, ER/LA positive, HER-2 negative disease in November 2013  Her tumor was found to be high risk, based on the MammaPrint  Therefore, she was treated with adjuvant chemotherapy with Taxotere and cyclophosphamide x4 cycles  Adjuvant chemotherapy was completed in March 2014 with no significant toxicity  She currently has very sporadic menstrual cycle  She presents today for follow-up  She has mild intermittent hot flashes  Otherwise, she feels very well  She denied swelling in the lower extremities  She has no respiratory symptoms  Her weight is stable  She see gynecologist at least annually  Her performance status is normal       Objective:     Primary Diagnosis:    1  Left breast cancer  Stage IA(pT1c, pN0,M0), grade 2  ER/LA positive, HER-2 negative disease  MammaPrint, high risk  Diagnosed in November 2013  2  BRCA mutation negative  Cancer Staging:  No matching staging information was found for the patient  Previous Hematologic/ Oncologic Treatment:     Adjuvant chemotherapy with Taxotere and cyclophosphamide x4, cycle  Completed in March 2014  Current Hematologic/ Oncologic Treatment:      Adjuvant hormonal therapy with tamoxifen 20 mg once a day since April 2014  Disease Status:     SHINE status post lumpectomy with sentinel lymph node biopsy  Test Results:    Pathology:     1 1 cm of invasive ductal carcinoma, grade 2  No evidence of lymphovascular invasion  3 sentinel lymph nodes were negative for metastatic disease  This was ER/LA positive, HER-2 negative disease  MammaPrint, high risk  Stage IA (pT1c, pN0,M0)  Radiology:    Mammography in April 2017 was benign  BI-RADS 1    Urine ultrasound showed endometrial thickening 1 5 cm in May 2017  Laboratory:        Physical Exam:      General Appearance:    Alert, oriented        Eyes:    PERRL   Ears:    Normal external ear canals, both ears   Nose:   Nares normal, septum midline   Throat:   Mucosa moist  Pharynx without injection  Neck:   Supple       Lungs:     Clear to auscultation bilaterally   Chest Wall:    No tenderness or deformity    Heart:    Regular rate and rhythm       Abdomen:     Soft, non-tender, bowel sounds +, no organomegaly           Extremities:   Extremities no cyanosis or edema       Skin:   no rash or icterus  Lymph nodes:   Cervical, supraclavicular, and axillary nodes normal   Neurologic:   CNII-XII intact, normal strength, sensation and reflexes     Throughout          Breast exam:    lumpectomy scar in the inner lower quadrant of the left breast with no palpable abnormalities  Right breast is negative  ROS: Review of Systems   Constitutional:        Minimal hot flashes   All other systems reviewed and are negative  Imaging: Us Pelvis Complete W Transvaginal    Result Date: 2/18/2018  Narrative: PELVIC ULTRASOUND, COMPLETE INDICATION: Ovarian cyst COMPARISON: None  TECHNIQUE:   Transabdominal pelvic ultrasound was performed in sagittal and transverse planes with a curvilinear transducer  Additional transvaginal imaging was performed to better evaluate the endometrium and ovaries  Imaging included volumetric sweeps as well as traditional still imaging technique  FINDINGS: UTERUS: The uterus is anteverted in position, measuring 8 5 x 3 8 x 5 3 cm  Contour and echotexture appear normal   Small intramural fibroid is suggested in the uterine body which measures 1 6 x 1 3 cm and this is better seen on the current study The cervix shows no suspicious abnormality  ENDOMETRIUM:  Normal caliber of 6 mm  Homogenous and normal in appearance  OVARIES/ADNEXA: Right ovary:  1 9 x 1 x 1 8 cm   No suspicious right ovarian abnormality  Doppler flow within normal limits  Left ovary:  2 4 x 1 2 x 1 7 cm  No suspicious left ovarian abnormality  Doppler flow within normal limits  No suspicious adnexal mass or loculated collections  There is no free fluid  Impression:  The previously noted cysts in the ovaries have resolved No free fluid seen Suggestion of small intramural fibroid in the body of the uterus which measures about 1 3 x 1 6 cm ,  Better seen on the current study  Workstation performed: [id number]         Labs:   Lab Results   Component Value Date    WBC 5 81 05/12/2014    HGB 12 1 05/12/2014    HCT 38 1 05/12/2014    MCV 94 05/12/2014     05/12/2014     Lab Results   Component Value Date     03/17/2014    K 4 4 03/17/2014     03/17/2014    CO2 27 8 03/17/2014    ANIONGAP 10 2 03/17/2014    BUN 17 07/03/2015    CREATININE 0 73 07/03/2015    GLUCOSE 76 03/17/2014    CALCIUM 9 2 03/17/2014    AST 18 03/17/2014    ALT 30 03/17/2014    ALKPHOS 122 03/17/2014    PROT 7 3 03/17/2014    BILITOT 0 3 03/17/2014       Current Medications: Reviewed  Allergies: Reviewed  PMH/FH/SH:  Reviewed      Vital Sign:    Body surface area is 1 62 meters squared      Wt Readings from Last 3 Encounters:   03/06/18 60 1 kg (132 lb 8 oz)   01/11/18 60 3 kg (133 lb)   12/15/17 59 9 kg (132 lb)        Temp Readings from Last 3 Encounters:   03/06/18 98 1 °F (36 7 °C) (Tympanic)   01/11/18 97 6 °F (36 4 °C)   09/15/17 99 1 °F (37 3 °C)        BP Readings from Last 3 Encounters:   03/06/18 120/80   01/11/18 120/78   12/15/17 118/70         Pulse Readings from Last 3 Encounters:   03/06/18 81   01/11/18 87   09/15/17 83     @LASTSAO2(3)@

## 2018-03-21 DIAGNOSIS — C50.911 MALIGNANT NEOPLASM OF RIGHT FEMALE BREAST (HCC): ICD-10-CM

## 2018-03-21 DIAGNOSIS — Z12.31 ENCOUNTER FOR SCREENING MAMMOGRAM FOR MALIGNANT NEOPLASM OF BREAST: ICD-10-CM

## 2018-04-03 ENCOUNTER — HOSPITAL ENCOUNTER (OUTPATIENT)
Dept: MAMMOGRAPHY | Facility: CLINIC | Age: 43
Discharge: HOME | End: 2018-04-03
Payer: COMMERCIAL

## 2018-04-03 DIAGNOSIS — C50.911 MALIGNANT NEOPLASM OF RIGHT FEMALE BREAST (HCC): ICD-10-CM

## 2018-04-03 PROCEDURE — 77066 DX MAMMO INCL CAD BI: CPT

## 2018-04-03 PROCEDURE — G0279 TOMOSYNTHESIS, MAMMO: HCPCS

## 2018-05-25 PROBLEM — N20.0 CALCULUS OF KIDNEY: Status: RESOLVED | Noted: 2018-05-25 | Resolved: 2018-05-25

## 2018-05-25 PROBLEM — D23.9 BENIGN NEOPLASM OF SKIN: Status: ACTIVE | Noted: 2018-05-25

## 2018-05-25 PROBLEM — C50.911 CARCINOMA OF RIGHT BREAST, ESTROGEN RECEPTOR POSITIVE (HCC): Status: ACTIVE | Noted: 2018-01-11

## 2018-05-25 PROBLEM — J30.2 SEASONAL ALLERGIES: Status: ACTIVE | Noted: 2017-06-06

## 2018-05-25 PROBLEM — Z17.0 CARCINOMA OF RIGHT BREAST, ESTROGEN RECEPTOR POSITIVE (HCC): Status: ACTIVE | Noted: 2018-01-11

## 2018-05-25 PROBLEM — N20.0 CALCULUS OF KIDNEY: Status: ACTIVE | Noted: 2018-05-25

## 2018-05-25 RX ORDER — LORATADINE 10 MG/1
1 TABLET ORAL DAILY PRN
COMMUNITY

## 2018-05-25 RX ORDER — MOMETASONE FUROATE 50 UG/1
SPRAY, METERED NASAL
COMMUNITY
Start: 2017-01-17 | End: 2018-12-27

## 2018-05-25 RX ORDER — NAPROXEN SODIUM 220 MG
2 TABLET ORAL DAILY PRN
COMMUNITY

## 2018-06-06 PROBLEM — R92.30 DENSE BREAST TISSUE: Status: ACTIVE | Noted: 2018-06-06

## 2018-06-06 PROBLEM — R92.2 DENSE BREAST TISSUE: Status: ACTIVE | Noted: 2018-06-06

## 2018-06-11 ENCOUNTER — OFFICE VISIT (OUTPATIENT)
Dept: SURGICAL ONCOLOGY | Facility: CLINIC | Age: 43
End: 2018-06-11
Payer: COMMERCIAL

## 2018-06-11 VITALS
WEIGHT: 133 LBS | BODY MASS INDEX: 23.57 KG/M2 | SYSTOLIC BLOOD PRESSURE: 116 MMHG | DIASTOLIC BLOOD PRESSURE: 70 MMHG | HEIGHT: 63 IN | RESPIRATION RATE: 16 BRPM | TEMPERATURE: 98.6 F | HEART RATE: 78 BPM

## 2018-06-11 DIAGNOSIS — C50.312 MALIGNANT NEOPLASM OF LOWER-INNER QUADRANT OF LEFT BREAST IN FEMALE, ESTROGEN RECEPTOR POSITIVE (HCC): Primary | ICD-10-CM

## 2018-06-11 DIAGNOSIS — R92.2 DENSE BREAST TISSUE: ICD-10-CM

## 2018-06-11 DIAGNOSIS — Z17.0 MALIGNANT NEOPLASM OF LOWER-INNER QUADRANT OF LEFT BREAST IN FEMALE, ESTROGEN RECEPTOR POSITIVE (HCC): Primary | ICD-10-CM

## 2018-06-11 PROCEDURE — 99214 OFFICE O/P EST MOD 30 MIN: CPT | Performed by: SURGERY

## 2018-06-11 NOTE — PROGRESS NOTES
Surgical Oncology Follow Up       [address] [facility],[address]  [facility] [address]  1975  [id number]  [address] [facility] [facility]  [facility] [id number]    Chief Complaint   Patient presents with    Breast Cancer     6 month follow up    Follow-up          Assessment & Plan:   Patient presents for a six-month follow-up  She has no complaints referable to her breast   She has a normal clinical breast exam   Her mammogram from April showed no worrisome findings  We will see her again in 6 months time  She is agreeable to seeing Chacho Maier at her next visit  All questions were answered the patient's satisfaction  Cancer History:        Malignant neoplasm of lower-inner quadrant of left female breast ([facility] [address] )    11/6/2013 Genetic Testing     BRCA negative  [facility]         11/19/2013 Surgery     Left lumpectomy with SLN biopsy  Invasive ductal carcinoma  1 1 cm  Grade 2  0/3 lymph nodes  ER 90  SD 90  Her 2 1+  Stage IA         12/20/2013 Genomic Testing     Mammaprint high risk         1/2014 - 3/2014 Chemotherapy     Adjuvant chemotherapy with Taxotere and cyclophosphamide x4, cycle  Completed in March 2014 4/2014 -  Hormone Therapy     Tamoxifen 20 mg daily  Dr Martin Mejía         4/22/2014 - 6/6/2014 Radiation     Whole breast radiation therapy  Dr Jae Ruiz              Interval History:   Patient presents for six-month follow-up visit  Her mammogram from April showed no worrisome findings  Review of Systems:   Review of Systems   Constitutional: Negative for activity change, appetite change, fatigue and unexpected weight change  HENT: Negative for ear pain, tinnitus, trouble swallowing and voice change  Eyes: Negative for pain and visual disturbance  Respiratory: Negative for cough, shortness of breath, wheezing and stridor  Cardiovascular: Negative for chest pain, palpitations and leg swelling  Gastrointestinal: Negative for abdominal distention, abdominal pain and blood in stool  Endocrine: Negative for cold intolerance and heat intolerance  Genitourinary: Negative for difficulty urinating, dysuria, flank pain and hematuria  Musculoskeletal: Negative for arthralgias, back pain, gait problem and joint swelling  Skin: Negative for color change, rash and wound  Allergic/Immunologic: Negative for immunocompromised state  Neurological: Negative for dizziness, seizures, speech difficulty, weakness and headaches  Hematological: Negative for adenopathy  Psychiatric/Behavioral: Negative for confusion  Past Medical History     Patient Active Problem List   Diagnosis    Benign neoplasm of skin    Malignant neoplasm of lower-inner quadrant of left female breast ([facility] [address] )    Right ovarian cyst    Seasonal allergies    Thickened endometrium    Dense breast tissue     No past medical history on file  No past surgical history on file  No family history on file  Social History     Social History    Marital status: /Civil Union     Spouse name: N/A    Number of children: N/A    Years of education: N/A     Occupational History    Not on file       Social History Main Topics    Smoking status: Not on file    Smokeless tobacco: Not on file    Alcohol use Not on file    Drug use: Unknown    Sexual activity: Not on file     Other Topics Concern    Not on file     Social History Narrative    No narrative on file       Current Outpatient Prescriptions:     loratadine (CLARITIN) 10 mg tablet, Take 1 tablet by mouth daily as needed, Disp: , Rfl:     mometasone (NASONEX) 50 mcg/act nasal spray, into each nostril, Disp: , Rfl:     naproxen sodium (ALEVE) 220 MG tablet, Take 2 tablets by mouth daily as needed, Disp: , Rfl:     tamoxifen (NOLVADEX) 20 mg tablet, Take 1 tablet (20 mg total) by mouth daily, Disp: 30 tablet, Rfl: 11  Allergies   Allergen Reactions    Sulfa Antibiotics Hives       Physical Exam:     Vitals:    06/11/18 0835   BP: 116/70   Pulse: 78   Resp: 16   Temp: 98 6 °F (37 °C)     Physical Exam   Constitutional: She is oriented to person, place, and time  She appears well-developed and well-nourished  HENT:   Head: Normocephalic and atraumatic  Mouth/Throat: Oropharynx is clear and moist    Eyes: EOM are normal  Pupils are equal, round, and reactive to light  Neck: Normal range of motion  Neck supple  No JVD present  No tracheal deviation present  No thyromegaly present  Cardiovascular: Normal rate, regular rhythm, normal heart sounds and intact distal pulses  Exam reveals no gallop and no friction rub  No murmur heard  Pulmonary/Chest: Effort normal and breath sounds normal  No respiratory distress  She has no wheezes  She has no rales  Both breasts were examined in the sitting and supine position  There are no worrisome skin lesions, no nipple retraction and no nipple discharge  There are no dominant masses, axillary adenopathy or supraclavicular adenopathy on either side  Abdominal: Soft  She exhibits no distension and no mass  There is no hepatomegaly  There is no tenderness  There is no rebound and no guarding  Musculoskeletal: Normal range of motion  She exhibits no edema or tenderness  Lymphadenopathy:     She has no cervical adenopathy  Neurological: She is alert and oriented to person, place, and time  No cranial nerve deficit  Skin: Skin is warm and dry  No rash noted  No erythema  Psychiatric: She has a normal mood and affect  Her behavior is normal    Vitals reviewed  Results:   I reviewed her mammograms I concur with the results          Advance Care Planning/Advance Directives:  I discussed the disease status, treatment plans and follow-up with the patient

## 2018-06-11 NOTE — LETTER
June 11, 2018     Michelle Garrett MD  [address]  [address] [facility]  [address]    Patient: Melonie Barthel   YOB: 1975   Date of Visit: 6/11/2018       Dear Dr Karina Swain: Thank you for referring Melonie Barthel to me for evaluation  Below are my notes for this consultation  If you have questions, please do not hesitate to call me  I look forward to following your patient along with you  Sincerely,        Kory Sebastian MD        CC: No Recipients  Kory Sebastian MD  6/11/2018  9:14 AM  Sign at close encounter     Surgical Oncology Follow Up       [address] [place] [address]  1975  [id number]  [address] [facility],[address]  [facility] [place] [id number]    Chief Complaint   Patient presents with    Breast Cancer     6 month follow up    Follow-up          Assessment & Plan:   Patient presents for a six-month follow-up  She has no complaints referable to her breast   She has a normal clinical breast exam   Her mammogram from April showed no worrisome findings  We will see her again in 6 months time  She is agreeable to seeing Chacho Maier at her next visit  All questions were answered the patient's satisfaction  Cancer History:        Malignant neoplasm of lower-inner quadrant of left female breast ([address] )    11/6/2013 Genetic Testing     BRCA negative  [facility]         11/19/2013 Surgery     Left lumpectomy with SLN biopsy  Invasive ductal carcinoma  1 1 cm  Grade 2  0/3 lymph nodes  ER 90  W[place] 90  Her 2 1+  Stage IA         12/20/2013 Genomic Testing     Mammaprint high risk         1/2014 - 3/2014 Chemotherapy     Adjuvant chemotherapy with Taxotere and cyclophosphamide x4, cycle   Completed in March 2014 4/2014 -  Hormone Therapy     Tamoxifen 20 mg daily  Dr Martin Mejía         4/22/2014 - 6/6/2014 Radiation     Whole breast radiation therapy  Dr Leslie Mariee              Interval History:   Patient presents for six-month follow-up visit  Her mammogram from April showed no worrisome findings  Review of Systems:   Review of Systems   Constitutional: Negative for activity change, appetite change, fatigue and unexpected weight change  HENT: Negative for ear pain, tinnitus, trouble swallowing and voice change  Eyes: Negative for pain and visual disturbance  Respiratory: Negative for cough, shortness of breath, wheezing and stridor  Cardiovascular: Negative for chest pain, palpitations and leg swelling  Gastrointestinal: Negative for abdominal distention, abdominal pain and blood in stool  Endocrine: Negative for cold intolerance and heat intolerance  Genitourinary: Negative for difficulty urinating, dysuria, flank pain and hematuria  Musculoskeletal: Negative for arthralgias, back pain, gait problem and joint swelling  Skin: Negative for color change, rash and wound  Allergic/Immunologic: Negative for immunocompromised state  Neurological: Negative for dizziness, seizures, speech difficulty, weakness and headaches  Hematological: Negative for adenopathy  Psychiatric/Behavioral: Negative for confusion  Past Medical History     Patient Active Problem List   Diagnosis    Benign neoplasm of skin    Malignant neoplasm of lower-inner quadrant of left female breast ([address] )    Right ovarian cyst    Seasonal allergies    Thickened endometrium    Dense breast tissue     No past medical history on file  No past surgical history on file  No family history on file  Social History     Social History    Marital status: /Civil Union     Spouse name: N/A    Number of children: N/A    Years of education: N/A     Occupational History    Not on file       Social History Main Topics    Smoking status: Not on file    Smokeless tobacco: Not on file    Alcohol use Not on file    Drug use: Unknown    Sexual activity: Not on file     Other Topics Concern    Not on file     Social History Narrative    No narrative on file       Current Outpatient Prescriptions:     loratadine (CLARITIN) 10 mg tablet, Take 1 tablet by mouth daily as needed, Disp: , Rfl:     mometasone (NASONEX) 50 mcg/act nasal spray, into each nostril, Disp: , Rfl:     naproxen sodium (ALEVE) 220 MG tablet, Take 2 tablets by mouth daily as needed, Disp: , Rfl:     tamoxifen (NOLVADEX) 20 mg tablet, Take 1 tablet (20 mg total) by mouth daily, Disp: 30 tablet, Rfl: 11  Allergies   Allergen Reactions    Sulfa Antibiotics Hives       Physical Exam:     Vitals:    06/11/18 0835   BP: 116/70   Pulse: 78   Resp: 16   Temp: 98 6 °F (37 °C)     Physical Exam   Constitutional: She is oriented to person, place, and time  She appears well-developed and well-nourished  HENT:   Head: Normocephalic and atraumatic  Mouth/Throat: Oropharynx is clear and moist    Eyes: EOM are normal  Pupils are equal, round, and reactive to light  Neck: Normal range of motion  Neck supple  No JVD present  No tracheal deviation present  No thyromegaly present  Cardiovascular: Normal rate, regular rhythm, normal heart sounds and intact distal pulses  Exam reveals no gallop and no friction rub  No murmur heard  Pulmonary/Chest: Effort normal and breath sounds normal  No respiratory distress  She has no wheezes  She has no rales  Both breasts were examined in the sitting and supine position  There are no worrisome skin lesions, no nipple retraction and no nipple discharge  There are no dominant masses, axillary adenopathy or supraclavicular adenopathy on either side  Abdominal: Soft  She exhibits no distension and no mass  There is no hepatomegaly  There is no tenderness  There is no rebound and no guarding  Musculoskeletal: Normal range of motion  She exhibits no edema or tenderness  Lymphadenopathy:     She has no cervical adenopathy     Neurological: She is alert and oriented to person, place, and time  No cranial nerve deficit  Skin: Skin is warm and dry  No rash noted  No erythema  Psychiatric: She has a normal mood and affect  Her behavior is normal    Vitals reviewed  Results:   I reviewed her mammograms I concur with the results          Advance Care Planning/Advance Directives:  I discussed the disease status, treatment plans and follow-up with the patient

## 2018-09-25 ENCOUNTER — CLINICAL SUPPORT (OUTPATIENT)
Dept: RADIATION ONCOLOGY | Facility: CLINIC | Age: 43
End: 2018-09-25
Payer: COMMERCIAL

## 2018-09-25 ENCOUNTER — RADIATION ONCOLOGY FOLLOW-UP (OUTPATIENT)
Dept: RADIATION ONCOLOGY | Facility: CLINIC | Age: 43
End: 2018-09-25
Attending: RADIOLOGY
Payer: COMMERCIAL

## 2018-09-25 VITALS
RESPIRATION RATE: 16 BRPM | HEART RATE: 88 BPM | DIASTOLIC BLOOD PRESSURE: 74 MMHG | SYSTOLIC BLOOD PRESSURE: 120 MMHG | BODY MASS INDEX: 24.45 KG/M2 | WEIGHT: 138 LBS | HEIGHT: 63 IN

## 2018-09-25 DIAGNOSIS — C50.312 MALIGNANT NEOPLASM OF LOWER-INNER QUADRANT OF LEFT BREAST IN FEMALE, ESTROGEN RECEPTOR POSITIVE (HCC): Primary | ICD-10-CM

## 2018-09-25 DIAGNOSIS — Z17.0 MALIGNANT NEOPLASM OF LOWER-INNER QUADRANT OF LEFT BREAST IN FEMALE, ESTROGEN RECEPTOR POSITIVE (HCC): Primary | ICD-10-CM

## 2018-09-25 PROCEDURE — 99214 OFFICE O/P EST MOD 30 MIN: CPT | Performed by: RADIOLOGY

## 2018-09-25 PROCEDURE — G0463 HOSPITAL OUTPT CLINIC VISIT: HCPCS | Performed by: RADIOLOGY

## 2018-09-25 NOTE — PROGRESS NOTES
Follow-up - Radiation Oncology   Linn Arevalo 1975 40 y o  female [id number]      History of Present Illness   Cancer Staging  Malignant neoplasm of lower-inner quadrant of left female breast [facility])  Staging form: Breast, AJCC 7th Edition  - Pathologic stage from 2013: Stage IA (T1c, N0, cM0) - Unsigned      Linn Arevalo returns today for routine scheduled follow-up visit  Overall she feels well  She denies any new focal musculoskeletal aches or pains or persistent headache  In general she is without complaints  Interval History Last seen 9/15/17    3/6/18 Dr Keo Martinez  continue with tamoxifen 20 mg daily;  standard of care with tamoxifen is 10 years    4/3/18 Bilateral diagnostic mammogram  IMPRESSION:No evidence for malignancy  ACR BI-RADS® Assessments: BiRad:1 - Negative    18 Dr Jamaica Andujar  No complaints; normal breast exam    18 Dr [place] Andujar    3/5/19 Dr Anat Lim      Malign[facility] lower-inner [facility]    2013 Genetic Testing     BRCA negative  Myriad         2013 Surgery     Left lumpectomy with SLN biopsy  Invasive ductal carcinoma  1 1 cm  Grade 2  0/3 lymph nodes  ER 90  MN 90  Her 2 1+  Stage IA         2013 Genomic Testing     Mammaprint high risk         2014 - 3/2014 Chemotherapy     Adjuvant chemotherapy with Taxotere and cyclophosphamide x4, cycle   Completed in 2014 -  Hormone Therapy     Tamoxifen 20 mg daily  Dr Keo Martinez         2014 - 2014 Radiation     Entire left breast to 5040 cGy with an additional  1000 cGy to the lumpectomy cavity  Dr Dhaval Granado            Past Medical History:   Diagnosis Date    Breast cancer ([facility]     History of chemotherapy     History of external beam radiation therapy     breast     Past Surgical History:   Procedure Laterality Date    BREAST LUMPECTOMY       SECTION         Social History   History   Alcohol Use    Yes Comment: social     History   Drug Use No     History   Smoking Status    Never Smoker   Smokeless Tobacco    Never Used         Meds/Allergies     Current Outpatient Prescriptions:     loratadine (CLARITIN) 10 mg tablet, Take 1 tablet by mouth daily as needed, Disp: , Rfl:     mometasone (NASONEX) 50 mcg/act nasal spray, into each nostril, Disp: , Rfl:     naproxen sodium (ALEVE) 220 MG tablet, Take 2 tablets by mouth daily as needed, Disp: , Rfl:     tamoxifen (NOLVADEX) 20 mg tablet, Take 1 tablet (20 mg total) by mouth daily, Disp: 30 tablet, Rfl: 11  Allergies   Allergen Reactions    Sulfa Antibiotics Hives         Review of Systems   Review of Systems   Constitutional: Negative  HENT: Negative  Eyes: Negative  Respiratory: Negative  Cardiovascular: Negative  Gastrointestinal: Negative  Endocrine: Negative  Genitourinary: Negative  Musculoskeletal: Negative  Skin: Negative  Allergic/Immunologic: Negative  Neurological: Negative  Hematological: Negative  Psychiatric/Behavioral: Negative  Screening  Tobacco  Current tobacco user: no  If yes, brief counseling provided: NA    Hypertension  Hypertension screening performed: yes  Normotensive:  yes  If no, referred to PCP: n/a    Depression Screening  Screened for depression using PHQ-2: yes    Screened for depression using PHQ-9:  no  Screening positive or negative:  negative  If score >4, was any of the following actions taken?    Additional evaluation for depression, suicide risk assesment, referral to PCP or psychiatry, medication started:  n/a    Advanced Care Planning for Patients >65 years  Advanced Care Planning Discussed:  n/a  Patient named surrogate decision maker or care plan in chart: n/a        OBJECTIVE:   /74 (BP Location: Right arm, Patient Position: Sitting, Cuff Size: Standard)   Pulse 88   Resp 16   Ht 5' 3" (1 6 m)   Wt 62 6 kg (138 lb)   BMI 24 45 kg/m²   Pain Assessment: 0  Karnofsky: 90 - Able to carry on normal activity; minor signs or symptoms of disease     Physical Exam   The patient presents today no apparent distress  Sclera anicteric  No cervical or supraclavicular lymphadenopathy  Lungs clear to auscultation bilaterally  Normal S1-S2 regular rate and rhythm  Right breast is within normal limits  Left breast is soft, nontender, without visible or palpable suspicious findings  No axillary lymphadenopathy  No lymphedema  Normal speech  Normal affect  RESULTS    Lab Results: No results found for this or any previous visit (from the past 672 hour(s))  Imaging Studies:No results found  Assessment/Plan:  No orders of the defined types were placed in this encounter  Rupinder Gurrola has done extremely well in follow-up and has no clinical evidence of disease, now over 4 years out from completion of radiation approximately 5 years from diagnosis  She will continue to follow closely with Medical and Surgical Oncology will return to our department on an as-needed basis  Linda Contreras MD  8/99/9092,7:80 AM    Portions of the record may have been created with voice recognition software   Occasional wrong word or "sound a like" substitutions may have occurred due to the inherent limitations of voice recognition software   Read the chart carefully and recognize, using context, where substitutions have occurred

## 2018-09-25 NOTE — PROGRESS NOTES
Cash Dewey  1975   Ms Sebas Godoy is a 40 y o  female       Chief Complaint   Patient presents with    Breast Cancer    Follow-up       Cancer Staging  Malignant neoplasm of lower-inner quadrant of left female breast [facility])  Staging form: Breast, AJCC 7th Edition  - Pathologic stage from 11/19/2013: Stage IA (T1c, N0, cM0) - Unsigned      Oncology History    Last seen 9/15/17    3/6/18 Dr Evy Millard  continue with tamoxifen 20 mg daily;  standard of care with tamoxifen is 10 years    4/3/18 Bilateral diagnostic mammogram  IMPRESSION:No evidence for malignancy  ACR BI-RADS® Assessments: BiRad:1 - Negative    6/11/18 Dr Daly Potter  No complaints; normal breast exam    12/13/18 Dr Daly Potter    3/5/19 Dr Evy Millard        Malignant neoplasm of lower-inner quadrant of left female breast ([facility] [address] [facility] )    11/6/2013 Genetic Testing     BRCA negative  Myriad         11/19/2013 Surgery     Left lumpectomy with SLN biopsy  Invasive ductal carcinoma  1 1 cm  Grade 2  0/3 lymph nodes  ER 90  IN 90  Her 2 1+  Stage IA         12/20/2013 Genomic Testing     Mammaprint high risk         1/2014 - 3/2014 Chemotherapy     Adjuvant chemotherapy with Taxotere and cyclophosphamide x4, cycle  Completed in March 2014 4/2014 -  Hormone Therapy     Tamoxifen 20 mg daily  Dr Evy Millard         4/22/2014 - 6/6/2014 Radiation     Entire left breast to 5040 cGy with an additional  1000 cGy to the lumpectomy cavity  Dr Rut Mckoy Trial: no        Interval History Last seen 9/15/17    3/6/18 Dr Evy Millard  continue with tamoxifen 20 mg daily;  standard of care with tamoxifen is 10 years    4/3/18 Bilateral diagnostic mammogram  IMPRESSION:No evidence for malignancy    ACR BI-RADS® Assessments: BiRad:1 - Negative    6/11/18 Dr Daly Potter  No complaints; normal breast exam    12/13/18 Dr Daly Potter    3/5/19 Dr Evy Millard    Screening  Tobacco  Current tobacco user: no  If yes, brief counseling provided: NA    Hypertension  Hypertension screening performed: yes  Normotensive:  yes  If no, referred to PCP: n/a    Depression Screening  Screened for depression using PHQ-2: yes    Screened for depression using PHQ-9:  no  Screening positive or negative:  negative  If score >4, was any of the following actions taken? Additional evaluation for depression, suicide risk assesment, referral to PCP or psychiatry, medication started:  [address] [facility] for Patients >65 years  Advanced Care Planning Discussed:  n/a  Patient named surrogate decision maker or care plan in chart: n/a    [unfilled]  Health Maintenance   Topic Date Due    Depression Screening PHQ  1975    Pneumococcal PPSV23 Highest Risk Adult (1 of 3 - PCV13) 1995    INFLUENZA VACCINE  2018    DTaP,Tdap,and Td Vaccines (2 - Td) 2019    PAP SMEAR  2019    MAMMOGRAM  2020       Patient Active Problem List   Diagnosis    Benign neoplasm of skin    Malignant neoplasm of lower-inner quadrant of left female breast ([facility] [facility] )    Right ovarian cyst    Seasonal allergies    Thickened endometrium    Dense breast tissue     Past Medical History:   Diagnosis Date    Breast cancer ([facility] )     History of chemotherapy     History of external beam radiation therapy     breast     Past Surgical History:   Procedure Laterality Date    BREAST LUMPECTOMY       SECTION       Family History   Problem Relation Age of Onset    Prostate cancer Paternal Grandfather      Social History     Social History    Marital status: /Civil Union     Spouse name: N/A    Number of children: N/A    Years of education: N/A     Occupational History    Not on file       Social History Main Topics    Smoking status: Never Smoker    Smokeless tobacco: Never Used    Alcohol use Yes      Comment: social    Drug use: No    Sexual activity: Not on file     Other Topics Concern    Not on file     Social History Narrative    No narrative on file       Current Outpatient Prescriptions:     loratadine (CLARITIN) 10 mg tablet, Take 1 tablet by mouth daily as needed, Disp: , Rfl:     mometasone (NASONEX) 50 mcg/act nasal spray, into each nostril, Disp: , Rfl:     naproxen sodium (ALEVE) 220 MG tablet, Take 2 tablets by mouth daily as needed, Disp: , Rfl:     tamoxifen (NOLVADEX) 20 mg tablet, Take 1 tablet (20 mg total) by mouth daily, Disp: 30 tablet, Rfl: 11  Allergies   Allergen Reactions    Sulfa Antibiotics Hives       Review of Systems:  Review of Systems   Constitutional: Negative  HENT: Negative  Eyes: Negative  Respiratory: Negative  Cardiovascular: Negative  Gastrointestinal: Negative  Endocrine: Negative  Genitourinary: Negative  Musculoskeletal: Negative  Skin: Negative  Allergic/Immunologic: Negative  Neurological: Negative  Hematological: Negative  Psychiatric/Behavioral: Negative  Vitals:    09/25/18 0815   BP: 120/74   BP Location: Right arm   Patient Position: Sitting   Cuff Size: Standard   Pulse: 88   Resp: 16   Weight: 62 6 kg (138 lb)   Height: 5' 3" (1 6 m)   Oxygen 100%    Pain Score: 0-No pain    Imaging:No results found      Teaching

## 2018-12-27 ENCOUNTER — OFFICE VISIT (OUTPATIENT)
Dept: SURGICAL ONCOLOGY | Facility: CLINIC | Age: 43
End: 2018-12-27
Payer: COMMERCIAL

## 2018-12-27 VITALS
SYSTOLIC BLOOD PRESSURE: 122 MMHG | TEMPERATURE: 98.2 F | HEART RATE: 84 BPM | HEIGHT: 63 IN | BODY MASS INDEX: 24.01 KG/M2 | RESPIRATION RATE: 16 BRPM | WEIGHT: 135.5 LBS | DIASTOLIC BLOOD PRESSURE: 68 MMHG

## 2018-12-27 DIAGNOSIS — Z12.31 SCREENING MAMMOGRAM, ENCOUNTER FOR: ICD-10-CM

## 2018-12-27 DIAGNOSIS — Z17.0 MALIGNANT NEOPLASM OF LOWER-INNER QUADRANT OF LEFT BREAST IN FEMALE, ESTROGEN RECEPTOR POSITIVE (HCC): Primary | ICD-10-CM

## 2018-12-27 DIAGNOSIS — C50.312 MALIGNANT NEOPLASM OF LOWER-INNER QUADRANT OF LEFT BREAST IN FEMALE, ESTROGEN RECEPTOR POSITIVE (HCC): Primary | ICD-10-CM

## 2018-12-27 PROCEDURE — 99214 OFFICE O/P EST MOD 30 MIN: CPT | Performed by: SURGERY

## 2018-12-27 NOTE — LETTER
December 27, 2018     Rosario Mejia MD  [address]  [address] [facility]  [address]    Patient: Juancho Martinez   YOB: 1975   Date of Visit: 12/27/2018       Dear Dr Grace Bello: Thank you for referring Juancho Goodeodalys to me for evaluation  Below are my notes for this consultation  If you have questions, please do not hesitate to call me  I look forward to following your patient along with you  Sincerely,        Sammy Howell MD        CC: No Recipients  Sammy Howell MD  12/27/2018 10:00 AM  Sign at close encounter     Surgical Oncology Follow Up       [facility] [address]  1975  [id number]  [facility]  [facility]  [facility] [place] [address] [address] [address]    Chief Complaint   Patient presents with    Breast Cancer     6 Month follow up           Assessment & Plan:   Henrietta Hylton presents for a six-month follow-up visit  She is now 5 years out from her diagnosis  She has no complaints referable to her breast   Clinical exam shows no evidence of local regional or distant recurrence disease  We will coordinate screening mammograms for April  We will see her back in 1 year  Patient understands that should she appreciate any changes or have any concerns she should contact us prior to that visit  All questions were answered to the patient's satisfaction  Cancer History:        Malignant neoplasm of lower-inner quadrant of left female breast ([facility] [address] )    11/6/2013 Genetic Testing     BRCA negative  Myriad         11/19/2013 Surgery     Left lumpectomy with SLN biopsy  Invasive ductal carcinoma  1 1 cm  Grade 2  0/3 lymph nodes  ER 90  NV 90  Her 2 1+  Stage IA         12/20/2013 Genomic Testing     Mammaprint high risk         1/2014 - 3/2014 Chemotherapy     Adjuvant chemotherapy with Taxotere and cyclophosphamide x4, cycle   Completed in 2014 -  Hormone Therapy     Tamoxifen 20 mg daily  Dr Charley Mayers         2014 - 2014 Radiation     Entire left breast to 5040 cGy with an additional  1000 cGy to the lumpectomy cavity  Dr Kaci Haque              Interval History:   See above    Review of Systems:   Review of Systems   Constitutional: Negative for activity change, appetite change and fatigue  HENT: Negative  Eyes: Negative  Respiratory: Negative for cough, shortness of breath and wheezing  Cardiovascular: Negative for chest pain and leg swelling  Gastrointestinal: Negative  Endocrine: Negative  Genitourinary: Negative  Musculoskeletal:        No new changes or complaints of bone pain   Skin: Negative  Allergic/Immunologic: Negative  Neurological: Negative  Hematological: Negative  Psychiatric/Behavioral: Negative  Past Medical History     Patient Active Problem List   Diagnosis    Benign neoplasm of skin    Malignant neoplasm of lower-inner quadrant of left female breast ([facility] [address] )    Right ovarian cyst    Seasonal allergies    Thickened endometrium    Dense breast tissue     Past Medical History:   Diagnosis Date    [facility]     History of chemotherapy     History of external beam radiation therapy     breast     Past Surgical History:   Procedure Laterality Date    BREAST LUMPECTOMY       SECTION       Family History   Problem Relation Age of Onset    Prostate cancer Paternal Grandfather      Social History     Social History    Marital status: /Civil Union     Spouse name: N/A    Number of children: N/A    Years of education: N/A     Occupational History    Not on file       Social History Main Topics    Smoking status: Never Smoker    Smokeless tobacco: Never Used    Alcohol use Yes      Comment: social    Drug use: No    Sexual activity: Not on file     Other Topics Concern    Not on file     Social History Narrative    No narrative on file       Current Outpatient Prescriptions:     loratadine (CLARITIN) 10 mg tablet, Take 1 tablet by mouth daily as needed, Disp: , Rfl:     naproxen sodium (ALEVE) 220 MG tablet, Take 2 tablets by mouth daily as needed, Disp: , Rfl:     tamoxifen (NOLVADEX) 20 mg tablet, Take 1 tablet (20 mg total) by mouth daily, Disp: 30 tablet, Rfl: 11  Allergies   Allergen Reactions    Sulfa Antibiotics Hives       Physical Exam:     Vitals:    12/27/18 0934   BP: 122/68   Pulse: 84   Resp: 16   Temp: 98 2 °F (36 8 °C)     Physical Exam   Constitutional: She is oriented to person, place, and time  She appears well-developed and well-nourished  HENT:   Head: Normocephalic and atraumatic  Mouth/Throat: Oropharynx is clear and moist    Eyes: Pupils are equal, round, and reactive to light  EOM are normal    Neck: Normal range of motion  Neck supple  No JVD present  No tracheal deviation present  No thyromegaly present  Cardiovascular: Normal rate, regular rhythm, normal heart sounds and intact distal pulses  Exam reveals no gallop and no friction rub  No murmur heard  Pulmonary/Chest: Effort normal and breath sounds normal  No respiratory distress  She has no wheezes  She has no rales  Both breasts were examined in the sitting and supine position  There are no worrisome skin lesions, no nipple retraction and no nipple discharge  There are no dominant masses, axillary adenopathy or supraclavicular adenopathy on either side  Abdominal: Soft  She exhibits no distension and no mass  There is no hepatomegaly  There is no tenderness  There is no rebound and no guarding  Musculoskeletal: Normal range of motion  She exhibits no edema or tenderness  Lymphadenopathy:     She has no cervical adenopathy  Neurological: She is alert and oriented to person, place, and time  No cranial nerve deficit  Skin: Skin is warm and dry  No rash noted  No erythema  Psychiatric: She has a normal mood and affect   Her behavior is normal    Vitals reviewed  Results:   No current breast imaging          Advance Care Planning/Advance Directives:  I discussed the disease status, treatment plans and follow-up with the patient

## 2018-12-27 NOTE — PROGRESS NOTES
Surgical Oncology Follow Up       [facility]  [facility] [address]  1975  [id number]  [place]  [address]  [facility]  [facility] [place] [address] [address] [address]    Chief Complaint   Patient presents with    Breast Cancer     6 Month follow up           Assessment & Plan:   Veronica Ceron presents for a six-month follow-up visit  She is now 5 years out from her diagnosis  She has no complaints referable to her breast   Clinical exam shows no evidence of local regional or distant recurrence disease  We will coordinate screening mammograms for April  We will see her back in 1 year  Patient understands that should she appreciate any changes or have any concerns she should contact us prior to that visit  All questions were answered to the patient's satisfaction  Cancer History:        Malignant neoplasm of lower-inner quadrant of left female breast ([facility] [address] )    11/6/2013 Genetic Testing     BRCA negative  Myriad         11/19/2013 Surgery     Left lumpectomy with SLN biopsy  Invasive ductal carcinoma  1 1 cm  Grade 2  0/3 lymph nodes  ER 90  TX 90  Her 2 1+  Stage IA         12/20/2013 Genomic Testing     Mammaprint high risk         1/2014 - 3/2014 Chemotherapy     Adjuvant chemotherapy with Taxotere and cyclophosphamide x4, cycle  Completed in March 2014 4/2014 -  Hormone Therapy     Tamoxifen 20 mg daily  Dr Janie Gilman         4/22/2014 - 6/6/2014 Radiation     Entire left breast to 5040 cGy with an additional  1000 cGy to the lumpectomy cavity  Dr Giselle Griffin              Interval History:   See above    Review of Systems:   Review of Systems   Constitutional: Negative for activity change, appetite change and fatigue  HENT: Negative  Eyes: Negative  Respiratory: Negative for cough, shortness of breath and wheezing      Cardiovascular: Negative for chest pain and leg swelling  Gastrointestinal: Negative  Endocrine: Negative  Genitourinary: Negative  Musculoskeletal:        No new changes or complaints of bone pain   Skin: Negative  Allergic/Immunologic: Negative  Neurological: Negative  Hematological: Negative  Psychiatric/Behavioral: Negative  Past Medical History     Patient Active Problem List   Diagnosis    Benign neoplasm of skin    [facility]    Right ovarian cyst    Seasonal allergies    Thickened endometrium    Dense breast tissue     Past Medical History:   Diagnosis Date    [facility]     History of chemotherapy     History of external beam radiation therapy     breast     Past Surgical History:   Procedure Laterality Date    BREAST LUMPECTOMY       SECTION       Family History   Problem Relation Age of Onset    Prostate cancer Paternal Grandfather      Social History     Social History    Marital status: /Civil Union     Spouse name: N/A    Number of children: N/A    Years of education: N/A     Occupational History    Not on file       Social History Main Topics    Smoking status: Never Smoker    Smokeless tobacco: Never Used    Alcohol use Yes      Comment: social    Drug use: No    Sexual activity: Not on file     Other Topics Concern    Not on file     Social History Narrative    No narrative on file       Current Outpatient Prescriptions:     loratadine (CLARITIN) 10 mg tablet, Take 1 tablet by mouth daily as needed, Disp: , Rfl:     naproxen sodium (ALEVE) 220 MG tablet, Take 2 tablets by mouth daily as needed, Disp: , Rfl:     tamoxifen (NOLVADEX) 20 mg tablet, Take 1 tablet (20 mg total) by mouth daily, Disp: 30 tablet, Rfl: 11  Allergies   Allergen Reactions    Sulfa Antibiotics Hives       Physical Exam:     Vitals:    18 0934   BP: 122/68   Pulse: 84   Resp: 16   Temp: 98 2 °F (36 8 °C)     Physical Exam Constitutional: She is oriented to person, place, and time  She appears well-developed and well-nourished  HENT:   Head: Normocephalic and atraumatic  Mouth/Throat: Oropharynx is clear and moist    Eyes: Pupils are equal, round, and reactive to light  EOM are normal    Neck: Normal range of motion  Neck supple  No JVD present  No tracheal deviation present  No thyromegaly present  Cardiovascular: Normal rate, regular rhythm, normal heart sounds and intact distal pulses  Exam reveals no gallop and no friction rub  No murmur heard  Pulmonary/Chest: Effort normal and breath sounds normal  No respiratory distress  She has no wheezes  She has no rales  Both breasts were examined in the sitting and supine position  There are no worrisome skin lesions, no nipple retraction and no nipple discharge  There are no dominant masses, axillary adenopathy or supraclavicular adenopathy on either side  Abdominal: Soft  She exhibits no distension and no mass  There is no hepatomegaly  There is no tenderness  There is no rebound and no guarding  Musculoskeletal: Normal range of motion  She exhibits no edema or tenderness  Lymphadenopathy:     She has no cervical adenopathy  Neurological: She is alert and oriented to person, place, and time  No cranial nerve deficit  Skin: Skin is warm and dry  No rash noted  No erythema  Psychiatric: She has a normal mood and affect  Her behavior is normal    Vitals reviewed  Results:   No current breast imaging          Advance Care Planning/Advance Directives:  I discussed the disease status, treatment plans and follow-up with the patient

## 2019-02-01 ENCOUNTER — ANNUAL EXAM (OUTPATIENT)
Dept: OBGYN CLINIC | Facility: MEDICAL CENTER | Age: 44
End: 2019-02-01
Payer: COMMERCIAL

## 2019-02-01 VITALS
HEIGHT: 62 IN | WEIGHT: 136 LBS | SYSTOLIC BLOOD PRESSURE: 132 MMHG | BODY MASS INDEX: 25.03 KG/M2 | DIASTOLIC BLOOD PRESSURE: 78 MMHG

## 2019-02-01 DIAGNOSIS — Z01.419 ENCOUNTER FOR GYNECOLOGICAL EXAMINATION (GENERAL) (ROUTINE) WITHOUT ABNORMAL FINDINGS: Primary | ICD-10-CM

## 2019-02-01 DIAGNOSIS — N92.1 MENORRHAGIA WITH IRREGULAR CYCLE: ICD-10-CM

## 2019-02-01 PROCEDURE — 99396 PREV VISIT EST AGE 40-64: CPT | Performed by: OBSTETRICS & GYNECOLOGY

## 2019-02-01 RX ORDER — TRANEXAMIC ACID 650 1/1
TABLET ORAL
Qty: 30 TABLET | Refills: 4 | Status: SHIPPED | OUTPATIENT
Start: 2019-02-01

## 2019-02-01 NOTE — PROGRESS NOTES
Assessment/Plan:    No problem-specific Assessment & Plan notes found for this encounter  Diagnoses and all orders for this visit:    Encounter for gynecological examination (general) (routine) without abnormal findings    Menorrhagia with irregular cycle  -     Tranexamic Acid 650 MG TABS; Take 2 tablets 3 times daily for up to 5 days of the menses        Annual exam completed  Breast care to continue with Surg Onc  TXA ERX sent for heavy menses  RTO 1 year  Subjective:      Patient ID: Elvia Franco is a 44 y o  female  Patient returns for annual gyn visit  Since she was last seen she has continued with some irregular menses  She has had 4 menses in the last 12 months  One of those cycles had very heavy flow  She has had very minimal menopausal symptoms  She has no vaginal dryness  She continues with her care management team with both medical and surgical oncology  She remains on tamoxifen  Gynecologic Exam         The following portions of the patient's history were reviewed and updated as appropriate:   She  has a past medical history of Breast cancer ([facility] [address] ); History of chemotherapy; and History of external beam radiation therapy  She   Patient Active Problem List    Diagnosis Date Noted    Encounter for gynecological examination (general) (routine) without abnormal findings 2019    Menorrhagia with irregular cycle 2019    Dense breast tissue 2018    Benign neoplasm of skin 2018    Seasonal allergies 2017    Right ovarian cyst 2016    Thickened endometrium 2014    Malignant neoplasm of lower-inner quadrant of left female breast ([facility] [address] ) 10/25/2013     She  has a past surgical history that includes Breast lumpectomy and  section  Her family history includes Prostate cancer in her paternal grandfather  She  reports that she has never smoked  She has never used smokeless tobacco  She reports that she drinks alcohol   She reports that she does not use drugs  Current Outpatient Prescriptions   Medication Sig Dispense Refill    loratadine (CLARITIN) 10 mg tablet Take 1 tablet by mouth daily as needed      naproxen sodium (ALEVE) 220 MG tablet Take 2 tablets by mouth daily as needed      tamoxifen (NOLVADEX) 20 mg tablet Take 1 tablet (20 mg total) by mouth daily 30 tablet 11    Tranexamic Acid 650 MG TABS Take 2 tablets 3 times daily for up to 5 days of the menses 30 tablet 4     No current facility-administered medications for this visit  Current Outpatient Prescriptions on File Prior to Visit   Medication Sig    loratadine (CLARITIN) 10 mg tablet Take 1 tablet by mouth daily as needed    naproxen sodium (ALEVE) 220 MG tablet Take 2 tablets by mouth daily as needed    tamoxifen (NOLVADEX) 20 mg tablet Take 1 tablet (20 mg total) by mouth daily     No current facility-administered medications on file prior to visit  She is allergic to sulfa antibiotics       Review of Systems   All other systems reviewed and are negative  Objective:      /78 (BP Location: Right arm, Patient Position: Sitting, Cuff Size: Standard)   Ht 5' 2" (1 575 m)   Wt 61 7 kg (136 lb)   LMP 01/16/2019 (Approximate)   Breastfeeding? No   BMI 24 87 kg/m²          Physical Exam   Constitutional: She is oriented to person, place, and time  She appears well-developed and well-nourished  HENT:   Head: Normocephalic and atraumatic  Nose: Nose normal    Eyes: Pupils are equal, round, and reactive to light  EOM are normal    Neck: Normal range of motion  Neck supple  No thyromegaly present  Cardiovascular: Normal rate and regular rhythm  Pulmonary/Chest: Effort normal and breath sounds normal  No respiratory distress  Breasts no masses, tenderness, skin changes   Abdominal: Soft  Bowel sounds are normal  She exhibits no distension and no mass  There is no tenderness   Hernia confirmed negative in the right inguinal area and confirmed negative in the left inguinal area  Genitourinary: Vagina normal and uterus normal  No breast swelling, tenderness, discharge or bleeding  Pelvic exam was performed with patient supine  No labial fusion  There is no rash, tenderness, lesion or injury on the right labia  There is no rash, tenderness, lesion or injury on the left labia  Cervix exhibits no motion tenderness, no discharge and no friability  Genitourinary Comments: Ext genitalia nl female w/o lesions  Vag healthy without lesions or discharge  Cervix healthy w/o lesions or discharge  uterus nl size, NT, no mass  Adnexa NT, no mass   Musculoskeletal: Normal range of motion  She exhibits no edema  Lymphadenopathy:        Right: No inguinal adenopathy present  Left: No inguinal adenopathy present  Neurological: She is alert and oriented to person, place, and time  She has normal reflexes  Skin: Skin is warm and dry  No rash noted  Psychiatric: She has a normal mood and affect  Her behavior is normal  Thought content normal    Nursing note and vitals reviewed

## 2019-02-27 DIAGNOSIS — C50.912 MALIGNANT NEOPLASM OF LEFT BREAST IN FEMALE, ESTROGEN RECEPTOR POSITIVE, UNSPECIFIED SITE OF BREAST (HCC): ICD-10-CM

## 2019-02-27 DIAGNOSIS — Z17.0 MALIGNANT NEOPLASM OF LEFT BREAST IN FEMALE, ESTROGEN RECEPTOR POSITIVE, UNSPECIFIED SITE OF BREAST (HCC): ICD-10-CM

## 2019-02-27 RX ORDER — TAMOXIFEN CITRATE 20 MG/1
TABLET ORAL
Qty: 30 TABLET | Refills: 11 | Status: SHIPPED | OUTPATIENT
Start: 2019-02-27 | End: 2020-03-09 | Stop reason: SDUPTHER

## 2019-03-05 ENCOUNTER — OFFICE VISIT (OUTPATIENT)
Dept: HEMATOLOGY ONCOLOGY | Facility: CLINIC | Age: 44
End: 2019-03-05
Payer: COMMERCIAL

## 2019-03-05 VITALS
HEART RATE: 92 BPM | WEIGHT: 135 LBS | DIASTOLIC BLOOD PRESSURE: 78 MMHG | SYSTOLIC BLOOD PRESSURE: 102 MMHG | HEIGHT: 62 IN | BODY MASS INDEX: 24.84 KG/M2 | OXYGEN SATURATION: 99 % | TEMPERATURE: 98.1 F | RESPIRATION RATE: 14 BRPM

## 2019-03-05 DIAGNOSIS — C50.312 MALIGNANT NEOPLASM OF LOWER-INNER QUADRANT OF LEFT BREAST IN FEMALE, ESTROGEN RECEPTOR POSITIVE (HCC): Primary | ICD-10-CM

## 2019-03-05 DIAGNOSIS — Z17.0 MALIGNANT NEOPLASM OF LOWER-INNER QUADRANT OF LEFT BREAST IN FEMALE, ESTROGEN RECEPTOR POSITIVE (HCC): Primary | ICD-10-CM

## 2019-03-05 PROCEDURE — 99214 OFFICE O/P EST MOD 30 MIN: CPT | Performed by: INTERNAL MEDICINE

## 2019-03-05 NOTE — PROGRESS NOTES
Hematology / Oncology Outpatient Follow Up Note    Mara Duffy 44 y o  female :1975 FYK:[id number]         Date:  3/5/2019    Assessment / Plan:  A 44year old premenopausal woman with stage IA left breast cancer, grade 2, ER/SD positive, HER-2 negative disease  Her disease was high risk, based on MammaPrint  She was treated with adjuvant chemotherapy with Taxotere and cyclophosphamide x4 cycles  Currently, she is on tamoxifen without significant side effects  Clinically, she has no evidence recurrent disease  I recommended her to continue with tamoxifen 20 mg daily  She is in agreement with my recommendation  I will see her again in a year for routine follow-up           Subjective:      HPI:  A 41year old premenopausal woman, who noticed a lump in the medial lower aspect of her left breast in 2013  She brought this to the medical attention  She was found to have abnormality based on imaging study  She underwent ultrasound-guided biopsy in 2013, which showed invasive mammary carcinoma, ER/SD positive, HER-2 negative disease  She also underwent right breast biopsy, as well as second biopsy of dislocation in the left breast  Both of which were negative for malignancy  She underwent lumpectomy with sentinel lymph node biopsy in 2013  Pathology revealed that 1 1 cm of invasive ductal carcinoma, grade 2  There is no evidence of lymphovascular invasion  3 sentinel lymph node were all negative for metastatic disease  This was ER/SD positive, HER-2 negative disease  Her tumor tissue was sent for MammaPrint, which came back recently as high risk disease  Because of her young age, she underwent BRCA1/2 testing which showed no evidence of mutation  She has no family history of breast cancer or ovarian cancer  She presents today for initial consultation regarding adjuvant treatment  She feels well with no complaint of pain  She has no weight change  Recently   She has no respiratory symptoms  She has regular menstrual cycles  She has no other comorbidities           Interval History:  A 42-year-old premenopausal woman, who has no evidence of BRCA mutation  She was diagnosed with stage IA left breast cancer, grade 2, ER/AZ positive, HER-2 negative disease in November 2013  Her tumor was found to be high risk, based on the MammaPrint  Therefore, she was treated with adjuvant chemotherapy with Taxotere and cyclophosphamide x4 cycles  Adjuvant chemotherapy was completed in March 2014 with no significant toxicity  Since the end of adjuvant chemotherapy, she has sporadic menstrual cycle  In the last 12 months, she have 4 menstrual bleeding  She has very minimal hot flashes  She denied any pain  She has no swelling in the lower extremity or respiratory symptoms  Her weight is stable  She has normal performance status                              Objective:      Primary Diagnosis:     1  Left breast cancer  Stage IA(pT1c, pN0,M0), grade 2  ER/AZ positive, HER-2 negative disease  MammaPrint, high risk  Diagnosed in November 2013  2  BRCA mutation negative             Cancer Staging:  No matching staging information was found for the patient         Previous Hematologic/ Oncologic Treatment:      Adjuvant chemotherapy with Taxotere and cyclophosphamide x4, cycle  Completed in March 2014       Current Hematologic/ Oncologic Treatment:       Adjuvant hormonal therapy with tamoxifen 20 mg once a day since April 2014       Disease Status:      SHINE status post lumpectomy with sentinel lymph node biopsy       Test Results:     Pathology:      1 1 cm of invasive ductal carcinoma, grade 2  No evidence of lymphovascular invasion  3 sentinel lymph nodes were negative for metastatic disease  This was ER/AZ positive, HER-2 negative disease  MammaPrint, high risk  Stage IA (pT1c, pN0,M0)     Radiology:     Mammography in April 2018 was benign  BI-RADS 1    Urine ultrasound showed endometrial thickening 6 mm in February 2018      Laboratory:           Physical Exam:        General Appearance:    Alert, oriented          Eyes:    PERRL   Ears:    Normal external ear canals, both ears   Nose:   Nares normal, septum midline   Throat:   Mucosa moist  Pharynx without injection  Neck:   Supple         Lungs:     Clear to auscultation bilaterally   Chest Wall:    No tenderness or deformity    Heart:    Regular rate and rhythm         Abdomen:     Soft, non-tender, bowel sounds +, no organomegaly               Extremities:   Extremities no cyanosis or edema         Skin:   no rash or icterus  Lymph nodes:   Cervical, supraclavicular, and axillary nodes normal   Neurologic:   CNII-XII intact, normal strength, sensation and reflexes     Throughout             Breast exam:    lumpectomy scar in the inner lower quadrant of the left breast with no palpable abnormalities  Right breast is negative  ROS: Review of Systems   Constitutional:        Mild hot flashes   All other systems reviewed and are negative  Imaging: No results found  Labs:   Lab Results   Component Value Date    WBC 5 81 05/12/2014    HGB 12 1 05/12/2014    HCT 38 1 05/12/2014    MCV 94 05/12/2014     05/12/2014     Lab Results   Component Value Date     03/17/2014    K 4 4 03/17/2014     03/17/2014    CO2 27 8 03/17/2014    ANIONGAP 10 2 03/17/2014    BUN 17 07/03/2015    CREATININE 0 73 07/03/2015    GLUCOSE 76 03/17/2014    CALCIUM 9 2 03/17/2014    AST 18 03/17/2014    ALT 30 03/17/2014    ALKPHOS 122 03/17/2014    PROT 7 3 03/17/2014    BILITOT 0 3 03/17/2014         Current Medications: Reviewed  Allergies: Reviewed  PMH/FH/SH:  Reviewed      Vital Sign:    Body surface area is 1 62 meters squared      Wt Readings from Last 3 Encounters:   03/05/19 61 2 kg (135 lb)   02/01/19 61 7 kg (136 lb)   12/27/18 61 5 kg (135 lb 8 oz)        Temp Readings from Last 3 Encounters:   03/05/19 98 1 °F (36 7 °C) (Tympanic)   12/27/18 98 2 °F (36 8 °C)   06/11/18 98 6 °F (37 °C)        BP Readings from Last 3 Encounters:   03/05/19 102/78   02/01/19 132/78   12/27/18 122/68         Pulse Readings from Last 3 Encounters:   03/05/19 92   12/27/18 84   09/25/18 88     @LASTSAO2(3)@

## 2019-04-09 ENCOUNTER — HOSPITAL ENCOUNTER (OUTPATIENT)
Dept: ULTRASOUND IMAGING | Facility: CLINIC | Age: 44
Discharge: HOME | End: 2019-04-09
Payer: COMMERCIAL

## 2019-04-09 ENCOUNTER — HOSPITAL ENCOUNTER (OUTPATIENT)
Dept: MAMMOGRAPHY | Facility: CLINIC | Age: 44
Discharge: HOME | End: 2019-04-09
Payer: COMMERCIAL

## 2019-04-09 VITALS — HEIGHT: 62 IN | BODY MASS INDEX: 24.84 KG/M2 | WEIGHT: 135 LBS

## 2019-04-09 DIAGNOSIS — Z12.31 SCREENING MAMMOGRAM, ENCOUNTER FOR: ICD-10-CM

## 2019-04-09 DIAGNOSIS — Z12.39 BREAST SCREENING: ICD-10-CM

## 2019-04-09 DIAGNOSIS — Z85.3 PERSONAL HISTORY OF BREAST CANCER: ICD-10-CM

## 2019-04-09 PROCEDURE — 77063 BREAST TOMOSYNTHESIS BI: CPT

## 2019-04-09 PROCEDURE — 76377 3D RENDER W/INTRP POSTPROCES: CPT

## 2019-04-09 PROCEDURE — 76641 ULTRASOUND BREAST COMPLETE: CPT

## 2019-04-09 PROCEDURE — 77067 SCR MAMMO BI INCL CAD: CPT

## 2019-09-06 ENCOUNTER — OFFICE VISIT (OUTPATIENT)
Dept: FAMILY MEDICINE CLINIC | Facility: CLINIC | Age: 44
End: 2019-09-06
Payer: COMMERCIAL

## 2019-09-06 VITALS
SYSTOLIC BLOOD PRESSURE: 120 MMHG | WEIGHT: 130 LBS | BODY MASS INDEX: 23.92 KG/M2 | DIASTOLIC BLOOD PRESSURE: 72 MMHG | TEMPERATURE: 98.2 F | HEIGHT: 62 IN | HEART RATE: 52 BPM | OXYGEN SATURATION: 99 %

## 2019-09-06 DIAGNOSIS — J30.2 SEASONAL ALLERGIES: ICD-10-CM

## 2019-09-06 DIAGNOSIS — Z13.1 SCREENING FOR DIABETES MELLITUS: ICD-10-CM

## 2019-09-06 DIAGNOSIS — Z00.00 ANNUAL PHYSICAL EXAM: Primary | ICD-10-CM

## 2019-09-06 DIAGNOSIS — Z13.220 SCREENING FOR LIPID DISORDERS: ICD-10-CM

## 2019-09-06 PROCEDURE — 99204 OFFICE O/P NEW MOD 45 MIN: CPT | Performed by: PHYSICIAN ASSISTANT

## 2019-09-06 PROCEDURE — 3008F BODY MASS INDEX DOCD: CPT | Performed by: PHYSICIAN ASSISTANT

## 2019-09-06 NOTE — PROGRESS NOTES
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 [address] ) (), History of chemotherapy, and History of external beam radiation therapy  ,  does not have any pertinent problems on file  ,   has a past surgical history that includes Breast lumpectomy;  section; Breast biopsy (Right, 10/22/2013); and Breast excisional biopsy (Left, 2013)  ,  family history includes Prostate cancer in her paternal grandfather  ,   reports that she has never smoked  She has never used smokeless tobacco  She reports that she drinks alcohol  She reports that she does not use drugs  ,  is allergic to sulfa antibiotics     Current Outpatient Medications   Medication Sig Dispense Refill    loratadine (CLARITIN) 10 mg tablet Take 1 tablet by mouth daily as needed      naproxen sodium (ALEVE) 220 MG tablet Take 2 tablets by mouth daily as needed      tamoxifen (NOLVADEX) 20 mg tablet TAKE 1 TABLET BY MOUTH EVERY DAY 30 tablet 11    Tranexamic Acid 650 MG TABS Take 2 tablets 3 times daily for up to 5 days of the menses 30 tablet 4     No current facility-administered medications for this visit          Review of Systems   Constitutional: Negative for activity change, appetite change, fatigue and fever  HENT: Negative for congestion, ear pain, postnasal drip, sore throat and trouble swallowing  Eyes: Negative for pain  Respiratory: Negative for cough, chest tightness and shortness of breath  Cardiovascular: Negative for chest pain, palpitations and leg swelling  Gastrointestinal: Negative for abdominal pain and nausea  Endocrine: Negative for cold intolerance and heat intolerance  Genitourinary: Negative for difficulty urinating  Musculoskeletal: Negative for arthralgias, back pain, neck pain and neck stiffness  Skin: Negative for rash  Allergic/Immunologic: Positive for environmental allergies  Negative for food allergies  Neurological: Negative for dizziness, speech difficulty, light-headedness and headaches  Hematological: Negative  Psychiatric/Behavioral: Negative  Objective:  Vitals:    09/06/19 1319   BP: 120/72   Pulse: (!) 52   Temp: 98 2 °F (36 8 °C)   SpO2: 99%   Weight: 59 kg (130 lb)   Height: 5' 2" (1 575 m)     Body mass index is 23 78 kg/m²  Physical Exam   Constitutional: She is oriented to person, place, and time  She appears well-developed and well-nourished  HENT:   Head: Normocephalic  Right Ear: Hearing, tympanic membrane, external ear and ear canal normal    Left Ear: Hearing, tympanic membrane, external ear and ear canal normal    Nose: Nose normal    Mouth/Throat: Uvula is midline, oropharynx is clear and moist and mucous membranes are normal    Eyes: Pupils are equal, round, and reactive to light  Conjunctivae and EOM are normal    Neck: Normal range of motion  Carotid bruit is not present  No thyromegaly present  Cardiovascular: Normal rate, regular rhythm, normal heart sounds and intact distal pulses  Pulmonary/Chest: Effort normal and breath sounds normal    Abdominal: Soft  Bowel sounds are normal  She exhibits no mass  There is no hepatosplenomegaly  There is no tenderness  There is no CVA tenderness     Musculoskeletal: Normal range of motion  Lymphadenopathy:     She has no cervical adenopathy  Neurological: She is alert and oriented to person, place, and time  She has normal reflexes  Skin: Skin is warm and dry  Psychiatric: She has a normal mood and affect  Her behavior is normal  Judgment and thought content normal    Nursing note and vitals reviewed

## 2019-09-07 ENCOUNTER — APPOINTMENT (OUTPATIENT)
Dept: LAB | Facility: HOSPITAL | Age: 44
End: 2019-09-07
Payer: COMMERCIAL

## 2019-09-07 DIAGNOSIS — Z13.1 SCREENING FOR DIABETES MELLITUS: ICD-10-CM

## 2019-09-07 DIAGNOSIS — Z13.220 SCREENING FOR LIPID DISORDERS: ICD-10-CM

## 2019-09-07 LAB
ALBUMIN SERPL BCP-MCNC: 3.4 G/DL (ref 3.5–5)
ALP SERPL-CCNC: 59 U/L (ref 46–116)
ALT SERPL W P-5'-P-CCNC: 12 U/L (ref 12–78)
ANION GAP SERPL CALCULATED.3IONS-SCNC: 7 MMOL/L (ref 4–13)
AST SERPL W P-5'-P-CCNC: 13 U/L (ref 5–45)
BILIRUB SERPL-MCNC: 0.4 MG/DL (ref 0.2–1)
BUN SERPL-MCNC: 18 MG/DL (ref 5–25)
CALCIUM SERPL-MCNC: 8.5 MG/DL (ref 8.3–10.1)
CHLORIDE SERPL-SCNC: 108 MMOL/L (ref 100–108)
CHOLEST SERPL-MCNC: 137 MG/DL (ref 50–200)
CO2 SERPL-SCNC: 25 MMOL/L (ref 21–32)
CREAT SERPL-MCNC: 0.76 MG/DL (ref 0.6–1.3)
GFR SERPL CREATININE-BSD FRML MDRD: 97 ML/MIN/1.73SQ M
GLUCOSE P FAST SERPL-MCNC: 94 MG/DL (ref 65–99)
HDLC SERPL-MCNC: 65 MG/DL (ref 40–60)
LDLC SERPL CALC-MCNC: 63 MG/DL (ref 0–100)
NONHDLC SERPL-MCNC: 72 MG/DL
POTASSIUM SERPL-SCNC: 4.8 MMOL/L (ref 3.5–5.3)
PROT SERPL-MCNC: 6.8 G/DL (ref 6.4–8.2)
SODIUM SERPL-SCNC: 140 MMOL/L (ref 136–145)
TRIGL SERPL-MCNC: 43 MG/DL

## 2019-09-07 PROCEDURE — 80061 LIPID PANEL: CPT

## 2019-09-07 PROCEDURE — 80053 COMPREHEN METABOLIC PANEL: CPT

## 2019-09-07 PROCEDURE — 36415 COLL VENOUS BLD VENIPUNCTURE: CPT

## 2019-09-09 ENCOUNTER — TELEPHONE (OUTPATIENT)
Dept: FAMILY MEDICINE CLINIC | Facility: CLINIC | Age: 44
End: 2019-09-09

## 2019-09-09 NOTE — TELEPHONE ENCOUNTER
----- Message from Isha Topete PA-C sent at 9/9/2019  7:48 AM EDT -----  Please let patient know labs are normal

## 2019-10-10 ENCOUNTER — TELEPHONE (OUTPATIENT)
Dept: FAMILY MEDICINE CLINIC | Facility: CLINIC | Age: 44
End: 2019-10-10

## 2019-10-25 ENCOUNTER — IMMUNIZATIONS (OUTPATIENT)
Dept: FAMILY MEDICINE CLINIC | Facility: CLINIC | Age: 44
End: 2019-10-25
Payer: COMMERCIAL

## 2019-10-25 DIAGNOSIS — Z23 NEED FOR INFLUENZA VACCINATION: Primary | ICD-10-CM

## 2019-10-25 PROCEDURE — 90471 IMMUNIZATION ADMIN: CPT

## 2019-10-25 PROCEDURE — 90682 RIV4 VACC RECOMBINANT DNA IM: CPT

## 2019-12-23 PROBLEM — Z79.810 USE OF TAMOXIFEN (NOLVADEX): Status: ACTIVE | Noted: 2019-12-23

## 2019-12-26 ENCOUNTER — OFFICE VISIT (OUTPATIENT)
Dept: SURGICAL ONCOLOGY | Facility: CLINIC | Age: 44
End: 2019-12-26
Payer: COMMERCIAL

## 2019-12-26 VITALS
HEART RATE: 95 BPM | HEIGHT: 62 IN | RESPIRATION RATE: 18 BRPM | SYSTOLIC BLOOD PRESSURE: 100 MMHG | WEIGHT: 131 LBS | DIASTOLIC BLOOD PRESSURE: 78 MMHG | BODY MASS INDEX: 24.11 KG/M2 | TEMPERATURE: 98.1 F

## 2019-12-26 DIAGNOSIS — Z79.810 USE OF TAMOXIFEN (NOLVADEX): ICD-10-CM

## 2019-12-26 DIAGNOSIS — Z17.0 MALIGNANT NEOPLASM OF LOWER-INNER QUADRANT OF LEFT BREAST IN FEMALE, ESTROGEN RECEPTOR POSITIVE (HCC): Primary | ICD-10-CM

## 2019-12-26 DIAGNOSIS — C50.312 MALIGNANT NEOPLASM OF LOWER-INNER QUADRANT OF LEFT BREAST IN FEMALE, ESTROGEN RECEPTOR POSITIVE (HCC): Primary | ICD-10-CM

## 2019-12-26 PROCEDURE — 99214 OFFICE O/P EST MOD 30 MIN: CPT | Performed by: SURGERY

## 2019-12-26 NOTE — PROGRESS NOTES
Surgical Oncology Follow Up       [facility] [place] [facility]  [address]  [facility] [place] [address]  1975  [id number]  [facility]  [address] [facility] [facility] [place] [address]    Chief Complaint   Patient presents with    Breast Cancer     1 year follow up          Assessment & Plan:   Patient presents for 1 year follow-up visit  She has no complaints referable to her breast   She remains on her tamoxifen with Dr Polly Villa  Clinical examination shows no evidence of local regional or distant recurrence disease  Her last mammogram in a but showed no worrisome findings  I recommended repeat mammograms in April  Diagnostic ultrasounds if there are any abnormalities identified on the screening mammogram     Cancer History:        Malignant neoplasm of lower-inner quadrant of left breast in female, estrogen receptor positive ([address] )    11/6/2013 Genetic Testing     BRCA negative  [facility]      11/19/2013 Surgery     Left lumpectomy with SLN biopsy  Invasive ductal carcinoma  1 1 cm  Grade 2  0/3 lymph nodes  ER 90  GA 90  Her 2 1+  Stage IA      12/20/2013 Genomic Testing     Mammaprint high risk      1/2014 - 3/2014 Chemotherapy     Adjuvant chemotherapy with Taxotere and cyclophosphamide x4, cycle  Completed in March 2014 4/2014 -  Hormone Therapy     Tamoxifen 20 mg daily  Dr Polly Villa      4/22/2014 - 6/6/2014 Radiation     Entire left breast to 5040 cGy with an additional  1000 cGy to the lumpectomy cavity  Dr Rebecca Monroy           Interval History:   See Assessment & Plan    Review of Systems:   Review of Systems   All other systems reviewed and are negative        Past Medical History     Patient Active Problem List   Diagnosis    Benign neoplasm of skin    Malignant neoplasm of lower-inner quadrant of left breast in female, estrogen receptor positive ([address] )    Right ovarian cyst    Seasonal allergies    Thickened endometrium    Dense breast tissue    Screening for lipid disorders    Menorrhagia with irregular cycle    Use of tamoxifen (Nolvadex)     Past Medical History:   Diagnosis Date    Breast cancer ([facility] [address] )     History of chemotherapy     History of external beam radiation therapy     breast     Past Surgical History:   Procedure Laterality Date    BREAST BIOPSY Right 10/22/2013    benign    BREAST EXCISIONAL BIOPSY Left 2013    left breast cancer    BREAST LUMPECTOMY       SECTION       Family History   Problem Relation Age of Onset    Prostate cancer Paternal Grandfather      Social History     Socioeconomic History    Marital status: /Civil Union     Spouse name: Not on file    Number of children: Not on file    Years of education: Not on file    Highest education level: Not on file   Occupational History    Not on file   Social Needs    Financial resource strain: Not on file    Food insecurity:     Worry: Not on file     Inability: Not on file    Transportation needs:     Medical: Not on file     Non-medical: Not on file   Tobacco Use    Smoking status: Never Smoker    Smokeless tobacco: Never Used   Substance and Sexual Activity    Alcohol use: Yes     Comment: social    Drug use: No    Sexual activity: Yes     Partners: Male     Birth control/protection: Condom   Lifestyle    Physical activity:     Days per week: Not on file     Minutes per session: Not on file    Stress: Not on file   Relationships    Social connections:     Talks on phone: Not on file     Gets together: Not on file     Attends [employer] service: Not on file     Active member of club or organization: Not on file     Attends meetings of clubs or organizations: Not on file     Relationship status: Not on file    Intimate partner violence:     Fear of current or ex partner: Not on file     Emotionally abused: Not on file     Physically abused: Not on file Forced sexual activity: Not on file   Other Topics Concern    Not on file   Social History Narrative    Not on file       Current Outpatient Medications:     loratadine (CLARITIN) 10 mg tablet, Take 1 tablet by mouth daily as needed, Disp: , Rfl:     naproxen sodium (ALEVE) 220 MG tablet, Take 2 tablets by mouth daily as needed, Disp: , Rfl:     tamoxifen (NOLVADEX) 20 mg tablet, TAKE 1 TABLET BY MOUTH EVERY DAY, Disp: 30 tablet, Rfl: 11    Tranexamic Acid 650 MG TABS, Take 2 tablets 3 times daily for up to 5 days of the menses, Disp: 30 tablet, Rfl: 4  Allergies   Allergen Reactions    Sulfa Antibiotics Hives       Physical Exam:     Vitals:    12/26/19 0821   BP: 100/78   Pulse: 95   Resp: 18   Temp: 98 1 °F (36 7 °C)     Physical Exam   Constitutional: She is oriented to person, place, and time  She appears well-developed and well-nourished  HENT:   Head: Normocephalic and atraumatic  Mouth/Throat: Oropharynx is clear and moist    Eyes: Pupils are equal, round, and reactive to light  EOM are normal    Neck: Normal range of motion  Neck supple  No JVD present  No tracheal deviation present  No thyromegaly present  Cardiovascular: Normal rate, regular rhythm, normal heart sounds and intact distal pulses  Exam reveals no gallop and no friction rub  No murmur heard  Pulmonary/Chest: Effort normal and breath sounds normal  No respiratory distress  She has no wheezes  She has no rales  Both breasts were examined in the sitting and supine position  There are no worrisome skin lesions, no nipple retraction and no nipple discharge  There are no dominant masses, axillary adenopathy or supraclavicular adenopathy on either side  Abdominal: Soft  She exhibits no distension and no mass  There is no hepatomegaly  There is no tenderness  There is no rebound and no guarding  Musculoskeletal: Normal range of motion  She exhibits no edema or tenderness     Lymphadenopathy:     She has no cervical adenopathy  Neurological: She is alert and oriented to person, place, and time  No cranial nerve deficit  Skin: Skin is warm and dry  No rash noted  No erythema  Psychiatric: She has a normal mood and affect  Her behavior is normal    Vitals reviewed  Results & Discussion:   Patient's mammogram demonstrated no abnormalities  She had heterogeneously dense breast   She had an a bus at that time which showed no worrisome findings  I recommended continuing 3D screening mammograms and ultrasounds only for abnormalities identified by clinical exam or screening mammogram   Patient is in agreement with this  Advance Care Planning/Advance Directives:  I discussed the disease status, treatment plans and follow-up with the patient

## 2020-03-09 DIAGNOSIS — Z17.0 MALIGNANT NEOPLASM OF LEFT BREAST IN FEMALE, ESTROGEN RECEPTOR POSITIVE, UNSPECIFIED SITE OF BREAST (HCC): ICD-10-CM

## 2020-03-09 DIAGNOSIS — C50.912 MALIGNANT NEOPLASM OF LEFT BREAST IN FEMALE, ESTROGEN RECEPTOR POSITIVE, UNSPECIFIED SITE OF BREAST (HCC): ICD-10-CM

## 2020-03-09 RX ORDER — TAMOXIFEN CITRATE 20 MG/1
20 TABLET ORAL DAILY
Qty: 30 TABLET | Refills: 11 | Status: SHIPPED | OUTPATIENT
Start: 2020-03-09 | End: 2021-03-01

## 2020-03-10 ENCOUNTER — OFFICE VISIT (OUTPATIENT)
Dept: HEMATOLOGY ONCOLOGY | Facility: CLINIC | Age: 45
End: 2020-03-10
Payer: COMMERCIAL

## 2020-03-10 VITALS
SYSTOLIC BLOOD PRESSURE: 122 MMHG | HEART RATE: 72 BPM | TEMPERATURE: 98.4 F | OXYGEN SATURATION: 99 % | BODY MASS INDEX: 24.29 KG/M2 | WEIGHT: 132 LBS | HEIGHT: 62 IN | DIASTOLIC BLOOD PRESSURE: 78 MMHG | RESPIRATION RATE: 18 BRPM

## 2020-03-10 DIAGNOSIS — C50.312 MALIGNANT NEOPLASM OF LOWER-INNER QUADRANT OF LEFT BREAST IN FEMALE, ESTROGEN RECEPTOR POSITIVE (HCC): Primary | ICD-10-CM

## 2020-03-10 DIAGNOSIS — Z17.0 MALIGNANT NEOPLASM OF LOWER-INNER QUADRANT OF LEFT BREAST IN FEMALE, ESTROGEN RECEPTOR POSITIVE (HCC): Primary | ICD-10-CM

## 2020-03-10 PROCEDURE — 3008F BODY MASS INDEX DOCD: CPT | Performed by: INTERNAL MEDICINE

## 2020-03-10 PROCEDURE — 1036F TOBACCO NON-USER: CPT | Performed by: INTERNAL MEDICINE

## 2020-03-10 PROCEDURE — 99214 OFFICE O/P EST MOD 30 MIN: CPT | Performed by: INTERNAL MEDICINE

## 2020-03-10 NOTE — PROGRESS NOTES
Hematology / Oncology Outpatient Follow Up Note    Tierra Michel 38 y o  female :1975 [id number]         Date:  3/10/2020    Assessment / Plan:  A 38 year old premenopausal woman with stage IA left breast cancer, grade 2, ER/CA positive, HER-2 negative disease  Her disease was high risk, based on MammaPrint  She was treated with adjuvant chemotherapy with Taxotere and cyclophosphamide x4 cycles  Currently, she is on tamoxifen without significant side effects  Based on her symptomatology and physical examination, she has no evidence recurrent disease   I recommended her to continue with tamoxifen 20 mg daily  She will stay on tamoxifen until 2024 to complete 10 years of treatment  She is aware of this  I will see her again in a year for routine follow-up           Subjective:      HPI:  A 41year old premenopausal woman, who noticed a lump in the medial lower aspect of her left breast in 2013  She brought this to the medical attention  She was found to have abnormality based on imaging study  She underwent ultrasound-guided biopsy in 2013, which showed invasive mammary carcinoma, ER/CA positive, HER-2 negative disease  She also underwent right breast biopsy, as well as second biopsy of dislocation in the left breast  Both of which were negative for malignancy  She underwent lumpectomy with sentinel lymph node biopsy in 2013  Pathology revealed that 1 1 cm of invasive ductal carcinoma, grade 2  There is no evidence of lymphovascular invasion  3 sentinel lymph node were all negative for metastatic disease  This was ER/CA positive, HER-2 negative disease  Her tumor tissue was sent for MammaPrint, which came back recently as high risk disease  Because of her young age, she underwent BRCA1/2 testing which showed no evidence of mutation  She has no family history of breast cancer or ovarian cancer   She presents today for initial consultation regarding adjuvant treatment  She feels well with no complaint of pain  She has no weight change  Recently  She has no respiratory symptoms  She has regular menstrual cycles  She has no other comorbidities              Interval History:  A 38year-old premenopausal woman, who has no evidence of BRCA mutation  She was diagnosed with stage IA left breast cancer, grade 2, ER/CO positive, HER-2 negative disease in November 2013  Her tumor was found to be high risk, based on the MammaPrint  Therefore, she was treated with adjuvant chemotherapy with Taxotere and cyclophosphamide x4 cycles  Adjuvant chemotherapy was completed in March 2014 with no significant toxicity  she presents today for routine follow-up  She is currently on tamoxifen with minimal toxicity  She has no complaint hot flashes  She denied any pain  She has no respiratory symptoms  She has no swelling in the lower extremities  She has for menstrual cycle in last 12 months  Her performance status is normal         Objective:      Primary Diagnosis:     1  Left breast cancer  Stage IA(pT1c, pN0,M0), grade 2  ER/CO positive, HER-2 negative disease  MammaPrint, high risk  Diagnosed in November 2013  2  BRCA mutation negative             Cancer Staging:  No matching staging information was found for the patient         Previous Hematologic/ Oncologic Treatment:      Adjuvant chemotherapy with Taxotere and cyclophosphamide x4, cycle  Completed in March 2014       Current Hematologic/ Oncologic Treatment:       Adjuvant hormonal therapy with tamoxifen 20 mg once a day since April 2014       Disease Status:      SHINE status post lumpectomy with sentinel lymph node biopsy       Test Results:     Pathology:      1 1 cm of invasive ductal carcinoma, grade 2  No evidence of lymphovascular invasion  3 sentinel lymph nodes were negative for metastatic disease  This was ER/CO positive, HER-2 negative disease  MammaPrint, high risk   Stage IA (pT1c, pN0,M0)        Radiology:     Mammography in April 2019  was benign  BI-RADS 1      Laboratory:           Physical Exam:        General Appearance:    Alert, oriented          Eyes:    PERRL   Ears:    Normal external ear canals, both ears   Nose:   Nares normal, septum midline   Throat:   Mucosa moist  Pharynx without injection  Neck:   Supple         Lungs:     Clear to auscultation bilaterally   Chest Wall:    No tenderness or deformity    Heart:    Regular rate and rhythm         Abdomen:     Soft, non-tender, bowel sounds +, no organomegaly               Extremities:   Extremities no cyanosis or edema         Skin:   no rash or icterus  Lymph nodes:   Cervical, supraclavicular, and axillary nodes normal   Neurologic:   CNII-XII intact, normal strength, sensation and reflexes     Throughout             Breast exam:    lumpectomy scar in the inner lower quadrant of the left breast with no palpable abnormalities  Right breast is negative              ROS: Review of Systems   All other systems reviewed and are negative  Imaging: No results found  Labs:   Lab Results   Component Value Date    WBC 5 81 05/12/2014    HGB 12 1 05/12/2014    HCT 38 1 05/12/2014    MCV 94 05/12/2014     05/12/2014     Lab Results   Component Value Date     03/17/2014    K 4 8 09/07/2019     09/07/2019    CO2 25 09/07/2019    ANIONGAP 10 2 03/17/2014    BUN 18 09/07/2019    CREATININE 0 76 09/07/2019    GLUCOSE 76 03/17/2014    GLUF 94 09/07/2019    CALCIUM 8 5 09/07/2019    AST 13 09/07/2019    ALT 12 09/07/2019    ALKPHOS 59 09/07/2019    PROT 7 3 03/17/2014    BILITOT 0 3 03/17/2014    EGFR 97 09/07/2019         Current Medications: Reviewed  Allergies: Reviewed  PMH/FH/SH:  Reviewed      Vital Sign:    Body surface area is 1 6 meters squared      Wt Readings from Last 3 Encounters:   03/10/20 59 9 kg (132 lb)   12/26/19 59 4 kg (131 lb)   09/06/19 59 kg (130 lb)        Temp Readings from Last 3 Encounters:   03/10/20 98 4 °F (36 9 °C) (Tympanic Core)   12/26/19 98 1 °F (36 7 °C)   09/06/19 98 2 °F (36 8 °C)        BP Readings from Last 3 Encounters:   03/10/20 122/78   12/26/19 100/78   09/06/19 120/72         Pulse Readings from Last 3 Encounters:   03/10/20 72   12/26/19 95   09/06/19 (!) 52     @LASTSAO2(3)@

## 2020-05-22 ENCOUNTER — HOSPITAL ENCOUNTER (OUTPATIENT)
Dept: MAMMOGRAPHY | Facility: CLINIC | Age: 45
Discharge: HOME | End: 2020-05-22
Payer: COMMERCIAL

## 2020-05-22 VITALS — BODY MASS INDEX: 22.86 KG/M2 | WEIGHT: 129 LBS | HEIGHT: 63 IN

## 2020-05-22 DIAGNOSIS — C50.312 MALIGNANT NEOPLASM OF LOWER-INNER QUADRANT OF LEFT BREAST IN FEMALE, ESTROGEN RECEPTOR POSITIVE (HCC): ICD-10-CM

## 2020-05-22 DIAGNOSIS — Z17.0 MALIGNANT NEOPLASM OF LOWER-INNER QUADRANT OF LEFT BREAST IN FEMALE, ESTROGEN RECEPTOR POSITIVE (HCC): ICD-10-CM

## 2020-05-22 PROCEDURE — 77067 SCR MAMMO BI INCL CAD: CPT

## 2020-05-22 PROCEDURE — 77063 BREAST TOMOSYNTHESIS BI: CPT

## 2020-09-03 ENCOUNTER — OFFICE VISIT (OUTPATIENT)
Dept: FAMILY MEDICINE CLINIC | Facility: CLINIC | Age: 45
End: 2020-09-03
Payer: COMMERCIAL

## 2020-09-03 VITALS
HEART RATE: 76 BPM | TEMPERATURE: 97.8 F | WEIGHT: 129 LBS | OXYGEN SATURATION: 98 % | HEIGHT: 63 IN | SYSTOLIC BLOOD PRESSURE: 112 MMHG | BODY MASS INDEX: 22.86 KG/M2 | DIASTOLIC BLOOD PRESSURE: 68 MMHG

## 2020-09-03 DIAGNOSIS — Z23 ENCOUNTER FOR IMMUNIZATION: ICD-10-CM

## 2020-09-03 DIAGNOSIS — D17.1 LIPOMA OF TORSO: ICD-10-CM

## 2020-09-03 DIAGNOSIS — Z00.00 ANNUAL PHYSICAL EXAM: Primary | ICD-10-CM

## 2020-09-03 PROCEDURE — 3725F SCREEN DEPRESSION PERFORMED: CPT | Performed by: FAMILY MEDICINE

## 2020-09-03 PROCEDURE — 90686 IIV4 VACC NO PRSV 0.5 ML IM: CPT

## 2020-09-03 PROCEDURE — 90471 IMMUNIZATION ADMIN: CPT

## 2020-09-03 PROCEDURE — 99396 PREV VISIT EST AGE 40-64: CPT | Performed by: FAMILY MEDICINE

## 2020-09-03 NOTE — PROGRESS NOTES
[address]     NAME: Digna Mccarty  AGE: 38 y o  SEX: female  : 1975     DATE: 9/3/2020     Assessment and Plan:     Problem List Items Addressed This Visit     None      Visit Diagnoses     Annual physical exam    -  Primary        Immunizations and preventive care screenings were discussed with patient today  Appropriate education was printed on patient's after visit summary  Counseling:  Alcohol/drug use: discussed moderation in alcohol intake, the recommendations for healthy alcohol use, and avoidance of illicit drug use  Dental Health: discussed importance of regular tooth brushing, flossing, and dental visits  Injury prevention: discussed safety/seat belts, safety helmets, smoke detectors, carbon dioxide detectors, and smoking near bedding or upholstery  Sexual health: discussed sexually transmitted diseases, partner selection, use of condoms, avoidance of unintended pregnancy, and contraceptive alternatives  · Exercise: the importance of regular exercise/physical activity was discussed  Recommend exercise 3-5 times per week for at least 30 minutes  Return in 1 year (on 9/3/2021)  Chief Complaint:     Chief Complaint   Patient presents with    Physical Exam     waist circumference: 29"      History of Present Illness:     Adult Annual Physical   Patient here for a comprehensive physical exam  The patient reports no problems  History of breast cancer (lumpectomy, chemo and radiation), stage 1A about 7 years ago  On Tamoxifen  Pap smear with Dr Jemima Winston on 2020  Notes that she has a "squishy" lump on the left side of her chest  States that it has been there for a long time  States that it is not changing or growing  Denies pain or redness       Diet and Physical Activity  · Diet/Nutrition: well balanced diet, limited junk food and consuming 3-5 servings of fruits/vegetables daily  · Exercise: walking, moderate cardiovascular exercise and 3-4 times a week on average  Depression Screening  PHQ-9 Depression Screening    PHQ-9:    Frequency of the following problems over the past two weeks:       Little interest or pleasure in doing things:  0 - not at all  Feeling down, depressed, or hopeless:  0 - not at all  PHQ-2 Score:  0       General Health  · Sleep: sleeps well and gets 7-8 hours of sleep on average  · Hearing: normal - bilateral   · Vision: goes for regular eye exams and most recent eye exam >1 year ago  · Dental: regular dental visits, brushes teeth twice daily and flosses teeth occasionally  /GYN Health  · Patient is: premenopausal  · Last menstrual period: 3/2020  · Contraceptive method: condoms  · Sexually active with one male partner  Not history of STD  No concern for STD  Review of Systems:     Review of Systems   Constitutional: Negative for activity change, appetite change, fatigue and fever  HENT: Negative for congestion, ear pain, sinus pressure, sinus pain and sore throat  Eyes: Negative for pain  Respiratory: Negative for cough and shortness of breath  Cardiovascular: Negative for chest pain and leg swelling  Gastrointestinal: Negative for abdominal distention, abdominal pain, constipation, diarrhea, nausea and vomiting  Genitourinary: Negative for dysuria, frequency and urgency  Musculoskeletal: Negative for gait problem  Skin: Negative for rash  Neurological: Negative for dizziness, light-headedness and headaches        Past Medical History:     Past Medical History:   Diagnosis Date    BRCA1 negative     BRCA2 negative     Breast cancer [facility] [address] ) 2013    left breast    History of chemotherapy 2013    left breast    History of external beam radiation therapy     breast      Past Surgical History:     Past Surgical History:   Procedure Laterality Date    BREAST BIOPSY Right 10/22/2013    benign    BREAST EXCISIONAL BIOPSY Left 2013    left breast cancer    BREAST LUMPECTOMY Left 2013     SECTION        Social History:        Social History     Socioeconomic History    Marital status: /Civil Union     Spouse name: None    Number of children: None    Years of education: None    Highest education level: None   Occupational History    None   Social Needs    Financial resource strain: None    Food insecurity     Worry: None     Inability: None    Transportation needs     Medical: None     Non-medical: None   Tobacco Use    Smoking status: Never Smoker    Smokeless tobacco: Never Used   Substance and Sexual Activity    Alcohol use: Yes     Comment: social    Drug use: No    Sexual activity: Yes     Partners: Male     Birth control/protection: Condom   Lifestyle    Physical activity     Days per week: None     Minutes per session: None    Stress: None   Relationships    Social connections     Talks on phone: None     Gets together: None     Attends Mandaeism service: None     Active member of club or organization: None     Attends meetings of clubs or organizations: None     Relationship status: None    Intimate partner violence     Fear of current or ex partner: None     Emotionally abused: None     Physically abused: None     Forced sexual activity: None   Other Topics Concern    None   Social History Narrative    None      Family History:     Family History   Problem Relation Age of Onset    Prostate cancer Paternal Grandfather     No Known Problems Mother     No Known Problems Sister     No Known Problems Paternal Aunt     Breast cancer Neg Hx       Current Medications:     Current Outpatient Medications   Medication Sig Dispense Refill    loratadine (CLARITIN) 10 mg tablet Take 1 tablet by mouth daily as needed      tamoxifen (NOLVADEX) 20 mg tablet Take 1 tablet (20 mg total) by mouth daily 30 tablet 11    naproxen sodium (ALEVE) 220 MG tablet Take 2 tablets by mouth daily as needed      Tranexamic Acid 650 MG TABS Take 2 tablets 3 times daily for up to 5 days of the menses (Patient not taking: Reported on 9/3/2020) 30 tablet 4     No current facility-administered medications for this visit  Allergies: Allergies   Allergen Reactions    Sulfa Antibiotics Hives      Physical Exam:     /68   Pulse 76   Temp 97 8 °F (36 6 °C)   Ht 5' 3" (1 6 m)   Wt 58 5 kg (129 lb)   SpO2 98%   BMI 22 85 kg/m²     Physical Exam  Vitals signs reviewed  Constitutional:       General: She is not in acute distress  Appearance: Normal appearance  HENT:      Head: Normocephalic and atraumatic  Right Ear: External ear normal       Left Ear: External ear normal       Nose: Nose normal       Mouth/Throat:      Mouth: Mucous membranes are moist    Eyes:      Extraocular Movements: Extraocular movements intact  Conjunctiva/sclera: Conjunctivae normal       Pupils: Pupils are equal, round, and reactive to light  Neck:      Musculoskeletal: Neck supple  Cardiovascular:      Rate and Rhythm: Normal rate and regular rhythm  Heart sounds: Normal heart sounds  Pulmonary:      Effort: Pulmonary effort is normal       Breath sounds: Normal breath sounds  Abdominal:      General: Bowel sounds are normal  There is no distension  Palpations: Abdomen is soft  Tenderness: There is no abdominal tenderness  Musculoskeletal:      Right lower leg: No edema  Left lower leg: No edema  Lymphadenopathy:      Cervical: No cervical adenopathy  Skin:     General: Skin is warm  Capillary Refill: Capillary refill takes less than 2 seconds  Findings: No rash  Comments: Left side of chest, lateral to sternal costal angle there is a soft, mobile nodule, likely a lipoma  Neurological:      Mental Status: She is alert  Mental status is at baseline            Jabier Escamilla DO  [address]

## 2020-09-03 NOTE — PATIENT INSTRUCTIONS

## 2020-09-22 ENCOUNTER — ANNUAL EXAM (OUTPATIENT)
Dept: OBGYN CLINIC | Facility: CLINIC | Age: 45
End: 2020-09-22
Payer: COMMERCIAL

## 2020-09-22 VITALS
HEIGHT: 63 IN | SYSTOLIC BLOOD PRESSURE: 120 MMHG | TEMPERATURE: 98.6 F | DIASTOLIC BLOOD PRESSURE: 70 MMHG | BODY MASS INDEX: 23.18 KG/M2 | WEIGHT: 130.8 LBS | RESPIRATION RATE: 14 BRPM

## 2020-09-22 DIAGNOSIS — Z12.31 ENCOUNTER FOR SCREENING MAMMOGRAM FOR MALIGNANT NEOPLASM OF BREAST: ICD-10-CM

## 2020-09-22 DIAGNOSIS — Z01.419 ENCOUNTER FOR GYNECOLOGICAL EXAMINATION: Primary | ICD-10-CM

## 2020-09-22 PROBLEM — Z13.220 SCREENING FOR LIPID DISORDERS: Status: RESOLVED | Noted: 2019-02-01 | Resolved: 2020-09-22

## 2020-09-22 PROBLEM — N92.1 MENORRHAGIA WITH IRREGULAR CYCLE: Status: RESOLVED | Noted: 2019-02-01 | Resolved: 2020-09-22

## 2020-09-22 PROCEDURE — 1036F TOBACCO NON-USER: CPT | Performed by: OBSTETRICS & GYNECOLOGY

## 2020-09-22 PROCEDURE — 99396 PREV VISIT EST AGE 40-64: CPT | Performed by: OBSTETRICS & GYNECOLOGY

## 2020-09-22 RX ORDER — NITROFURANTOIN 25; 75 MG/1; MG/1
CAPSULE ORAL
COMMUNITY
Start: 2020-09-21 | End: 2021-10-05 | Stop reason: ALTCHOICE

## 2020-09-22 NOTE — ASSESSMENT & PLAN NOTE
Pap/HPV current  Mammogram ordered (Dr Magaly Del Rio)    Encourage healthy diet, exercise, Calcium 1200mg per day and at least 800 iu Vitamin D daily

## 2020-09-22 NOTE — PROGRESS NOTES
Assessment/Plan:    Encounter for gynecological examination  Pap/HPV current  Mammogram ordered (Dr Asael Coronel)    Encourage healthy diet, exercise, Calcium 1200mg per day and at least 800 iu Vitamin D daily  Diagnoses and all orders for this visit:    Encounter for gynecological examination    Encounter for screening mammogram for malignant neoplasm of breast  -     Mammo screening bilateral w cad; Future    Other orders  -     nitrofurantoin (MACROBID) 100 mg capsule          Subjective:      Patient ID: Elvia Franco is a 38 y o  female  38year old female  is here for her yearly examination  Patient last Pap smear was on 2016 Pap was negative HPV was negative   Patient last Mammogram was on 2020  Patient reports that she has a UTI she is taking Macrobid 100 mg  medication   Menses occur 4 x per year last 5 days heavy but tolerable    Working from home, [employer] are [id number], [employer]    Gynecologic Exam   The patient's pertinent negatives include no genital itching, genital lesions, genital odor, genital rash, pelvic pain, vaginal bleeding or vaginal discharge  The patient is experiencing no pain  Pertinent negatives include no chills, constipation, diarrhea, fever, nausea, sore throat, urgency or vomiting  She is sexually active  Her menstrual history has been irregular  The following portions of the patient's history were reviewed and updated as appropriate:   She  has a past medical history of BRCA1 negative, BRCA2 negative, Breast cancer ([facility] [address] ) (), History of chemotherapy (), and History of external beam radiation therapy    She   Patient Active Problem List    Diagnosis Date Noted    Encounter for gynecological examination 2020    Use of tamoxifen (Nolvadex) 2019    Dense breast tissue 2018    Benign neoplasm of skin 2018    Seasonal allergies 2017    Malignant neoplasm of lower-inner quadrant of left breast in female, estrogen receptor positive ([facility] [address] ) 10/25/2013     She  has a past surgical history that includes  section; Breast biopsy (Right, 10/22/2013); Breast excisional biopsy (Left, 2013); and Breast lumpectomy (Left, )  Her family history includes No Known Problems in her mother, paternal aunt, and sister; Prostate cancer in her paternal grandfather  She  reports that she has never smoked  She has never used smokeless tobacco  She reports current alcohol use  She reports that she does not use drugs  Current Outpatient Medications   Medication Sig Dispense Refill    loratadine (CLARITIN) 10 mg tablet Take 1 tablet by mouth daily as needed      naproxen sodium (ALEVE) 220 MG tablet Take 2 tablets by mouth daily as needed      nitrofurantoin (MACROBID) 100 mg capsule       tamoxifen (NOLVADEX) 20 mg tablet Take 1 tablet (20 mg total) by mouth daily 30 tablet 11    Tranexamic Acid 650 MG TABS Take 2 tablets 3 times daily for up to 5 days of the menses (Patient not taking: Reported on 9/3/2020) 30 tablet 4     No current facility-administered medications for this visit  Current Outpatient Medications on File Prior to Visit   Medication Sig    loratadine (CLARITIN) 10 mg tablet Take 1 tablet by mouth daily as needed    naproxen sodium (ALEVE) 220 MG tablet Take 2 tablets by mouth daily as needed    nitrofurantoin (MACROBID) 100 mg capsule     tamoxifen (NOLVADEX) 20 mg tablet Take 1 tablet (20 mg total) by mouth daily    Tranexamic Acid 650 MG TABS Take 2 tablets 3 times daily for up to 5 days of the menses (Patient not taking: Reported on 9/3/2020)     No current facility-administered medications on file prior to visit  She is allergic to sulfa antibiotics       Review of Systems   Constitutional: Negative for activity change, appetite change, chills, fatigue and fever  HENT: Negative for rhinorrhea, sneezing and sore throat  Eyes: Negative for visual disturbance  Respiratory: Negative for cough, shortness of breath and wheezing  Cardiovascular: Negative for chest pain, palpitations and leg swelling  Gastrointestinal: Negative for abdominal distention, constipation, diarrhea, nausea and vomiting  Genitourinary: Negative for difficulty urinating, pelvic pain, urgency and vaginal discharge  Neurological: Negative for syncope and light-headedness  Objective:      /70 (BP Location: Right arm, Patient Position: Sitting, Cuff Size: Standard)   Temp 98 6 °F (37 °C) (Skin)   Resp 14   Ht 5' 3" (1 6 m)   Wt 59 3 kg (130 lb 12 8 oz)   LMP 08/24/2020 (Exact Date)   BMI 23 17 kg/m²          Physical Exam  Chest:      Breasts: Breasts are symmetrical          Right: No inverted nipple, mass, nipple discharge, skin change or tenderness  Left: No inverted nipple, mass, nipple discharge, skin change or tenderness  Genitourinary:     Labia:         Right: No rash, tenderness, lesion or injury  Left: No rash, tenderness, lesion or injury  Vagina: Normal  No vaginal discharge, tenderness or bleeding  Cervix: No cervical motion tenderness, discharge or friability  Uterus: Not deviated, not enlarged, not fixed and not tender  Adnexa:         Right: No mass, tenderness or fullness  Left: No mass, tenderness or fullness  Neurological:      Mental Status: She is alert and oriented to person, place, and time

## 2020-12-22 ENCOUNTER — TELEPHONE (OUTPATIENT)
Dept: SURGICAL ONCOLOGY | Facility: CLINIC | Age: 45
End: 2020-12-22

## 2020-12-24 ENCOUNTER — VBI (OUTPATIENT)
Dept: ADMINISTRATIVE | Facility: OTHER | Age: 45
End: 2020-12-24

## 2020-12-24 ENCOUNTER — OFFICE VISIT (OUTPATIENT)
Dept: SURGICAL ONCOLOGY | Facility: CLINIC | Age: 45
End: 2020-12-24
Payer: COMMERCIAL

## 2020-12-24 VITALS
HEART RATE: 72 BPM | WEIGHT: 128 LBS | BODY MASS INDEX: 22.68 KG/M2 | TEMPERATURE: 98.7 F | HEIGHT: 63 IN | RESPIRATION RATE: 18 BRPM | SYSTOLIC BLOOD PRESSURE: 106 MMHG | DIASTOLIC BLOOD PRESSURE: 60 MMHG

## 2020-12-24 DIAGNOSIS — C50.312 MALIGNANT NEOPLASM OF LOWER-INNER QUADRANT OF LEFT BREAST IN FEMALE, ESTROGEN RECEPTOR POSITIVE (HCC): Primary | ICD-10-CM

## 2020-12-24 DIAGNOSIS — Z17.0 MALIGNANT NEOPLASM OF LOWER-INNER QUADRANT OF LEFT BREAST IN FEMALE, ESTROGEN RECEPTOR POSITIVE (HCC): Primary | ICD-10-CM

## 2020-12-24 DIAGNOSIS — Z79.810 USE OF TAMOXIFEN (NOLVADEX): ICD-10-CM

## 2020-12-24 DIAGNOSIS — Z12.31 SCREENING MAMMOGRAM, ENCOUNTER FOR: ICD-10-CM

## 2020-12-24 PROCEDURE — 99214 OFFICE O/P EST MOD 30 MIN: CPT | Performed by: SURGERY

## 2020-12-24 PROCEDURE — 3008F BODY MASS INDEX DOCD: CPT | Performed by: SURGERY

## 2020-12-24 PROCEDURE — 1036F TOBACCO NON-USER: CPT | Performed by: SURGERY

## 2021-02-27 DIAGNOSIS — C50.912 MALIGNANT NEOPLASM OF LEFT BREAST IN FEMALE, ESTROGEN RECEPTOR POSITIVE, UNSPECIFIED SITE OF BREAST (HCC): ICD-10-CM

## 2021-02-27 DIAGNOSIS — Z17.0 MALIGNANT NEOPLASM OF LEFT BREAST IN FEMALE, ESTROGEN RECEPTOR POSITIVE, UNSPECIFIED SITE OF BREAST (HCC): ICD-10-CM

## 2021-03-01 RX ORDER — TAMOXIFEN CITRATE 20 MG/1
TABLET ORAL
Qty: 90 TABLET | Refills: 3 | Status: SHIPPED | OUTPATIENT
Start: 2021-03-01 | End: 2022-02-21

## 2021-03-08 ENCOUNTER — TELEPHONE (OUTPATIENT)
Dept: HEMATOLOGY ONCOLOGY | Facility: CLINIC | Age: 46
End: 2021-03-08

## 2021-03-09 ENCOUNTER — OFFICE VISIT (OUTPATIENT)
Dept: HEMATOLOGY ONCOLOGY | Facility: CLINIC | Age: 46
End: 2021-03-09
Payer: COMMERCIAL

## 2021-03-09 VITALS
OXYGEN SATURATION: 99 % | WEIGHT: 130 LBS | HEIGHT: 63 IN | TEMPERATURE: 98 F | RESPIRATION RATE: 18 BRPM | SYSTOLIC BLOOD PRESSURE: 104 MMHG | HEART RATE: 71 BPM | DIASTOLIC BLOOD PRESSURE: 62 MMHG | BODY MASS INDEX: 23.04 KG/M2

## 2021-03-09 DIAGNOSIS — C50.312 MALIGNANT NEOPLASM OF LOWER-INNER QUADRANT OF LEFT BREAST IN FEMALE, ESTROGEN RECEPTOR POSITIVE (HCC): Primary | ICD-10-CM

## 2021-03-09 DIAGNOSIS — Z17.0 MALIGNANT NEOPLASM OF LOWER-INNER QUADRANT OF LEFT BREAST IN FEMALE, ESTROGEN RECEPTOR POSITIVE (HCC): Primary | ICD-10-CM

## 2021-03-09 PROCEDURE — 99214 OFFICE O/P EST MOD 30 MIN: CPT | Performed by: INTERNAL MEDICINE

## 2021-03-09 PROCEDURE — 3008F BODY MASS INDEX DOCD: CPT | Performed by: INTERNAL MEDICINE

## 2021-03-09 NOTE — PROGRESS NOTES
Hematology / Oncology Outpatient Follow Up Note    Eliseo Nair 41 y o  female :1975 QRF:[id number]         Date:  3/9/2021    Assessment / Plan:  A 44 year old premenopausal woman with stage IA left breast cancer, grade 2, ER/MT positive, HER-2 negative disease  Her disease was high risk, based on MammaPrint  She was treated with adjuvant chemotherapy with Taxotere and cyclophosphamide x4 cycles  Currently, she is on tamoxifen without significant side effects  Clinically, she has no evidence recurrent disease  I recommended her to continue with tamoxifen 20 mg daily until 2024  She is in agreement with my recommendation  I will see her again in a year for routine follow-up       Subjective:      HPI:  A 41year old premenopausal woman, who noticed a lump in the medial lower aspect of her left breast in 2013  She brought this to the medical attention  She was found to have abnormality based on imaging study  She underwent ultrasound-guided biopsy in 2013, which showed invasive mammary carcinoma, ER/MT positive, HER-2 negative disease  She also underwent right breast biopsy, as well as second biopsy of dislocation in the left breast  Both of which were negative for malignancy  She underwent lumpectomy with sentinel lymph node biopsy in 2013  Pathology revealed that 1 1 cm of invasive ductal carcinoma, grade 2  There is no evidence of lymphovascular invasion  3 sentinel lymph node were all negative for metastatic disease  This was ER/MT positive, HER-2 negative disease  Her tumor tissue was sent for MammaPrint, which came back recently as high risk disease  Because of her young age, she underwent BRCA1/2 testing which showed no evidence of mutation  She has no family history of breast cancer or ovarian cancer  She presents today for initial consultation regarding adjuvant treatment  She feels well with no complaint of pain  She has no weight change  Recently   She has no respiratory symptoms  She has regular menstrual cycles  She has no other comorbidities              Interval History:  A 41year-old premenopausal woman, who has no evidence of BRCA mutation  She was diagnosed with stage IA left breast cancer, grade 2, ER/NH positive, HER-2 negative disease in November 2013  Her tumor was found to be high risk, based on the MammaPrint  Therefore, she was treated with adjuvant chemotherapy with Taxotere and cyclophosphamide x4 cycles  Adjuvant chemotherapy was completed in March 2014 with no significant toxicity   she presents today for routine follow-up  She is currently on adjuvant hormonal therapy with tamoxifen with excellent tolerance  She has stable hot flashes during the night  She has no respiratory symptom or swelling in lower extremities  Her weight is stable  She had 4 menstrual bleeding in the last 12 months  She denied any pain  Her performance status is normal       Objective:      Primary Diagnosis:     1  Left breast cancer  Stage IA(pT1c, pN0,M0), grade 2  ER/NH positive, HER-2 negative disease  MammaPrint, high risk  Diagnosed in November 2013  2  BRCA mutation negative             Cancer Staging:  No matching staging information was found for the patient         Previous Hematologic/ Oncologic Treatment:      Adjuvant chemotherapy with Taxotere and cyclophosphamide x4, cycle  Completed in March 2014       Current Hematologic/ Oncologic Treatment:       Adjuvant hormonal therapy with tamoxifen 20 mg once a day since April 2014       Disease Status:      SHINE status post lumpectomy with sentinel lymph node biopsy       Test Results:     Pathology:      1 1 cm of invasive ductal carcinoma, grade 2  No evidence of lymphovascular invasion  3 sentinel lymph nodes were negative for metastatic disease  This was ER/NH positive, HER-2 negative disease  MammaPrint, high risk  Stage IA (pT1c, pN0,M0)        Radiology:     Mammography in  May 2020  was benign   BI-RADS 1      Laboratory:           Physical Exam:        General Appearance:    Alert, oriented          Eyes:    PERRL   Ears:    Normal external ear canals, both ears   Nose:   Nares normal, septum midline   Throat:   Mucosa moist  Pharynx without injection  Neck:   Supple         Lungs:     Clear to auscultation bilaterally   Chest Wall:    No tenderness or deformity    Heart:    Regular rate and rhythm         Abdomen:     Soft, non-tender, bowel sounds +, no organomegaly               Extremities:   Extremities no cyanosis or edema         Skin:   no rash or icterus  Lymph nodes:   Cervical, supraclavicular, and axillary nodes normal   Neurologic:   CNII-XII intact, normal strength, sensation and reflexes     Throughout             Breast exam:    lumpectomy scar in the inner lower quadrant of the left breast with no palpable abnormalities  Right breast is negative             ROS: Review of Systems   All other systems reviewed and are negative  Imaging: No results found  Labs:   Lab Results   Component Value Date    WBC 5 81 05/12/2014    HGB 12 1 05/12/2014    HCT 38 1 05/12/2014    MCV 94 05/12/2014     05/12/2014     Lab Results   Component Value Date     03/17/2014    K 4 8 09/07/2019     09/07/2019    CO2 25 09/07/2019    ANIONGAP 10 2 03/17/2014    BUN 18 09/07/2019    CREATININE 0 76 09/07/2019    GLUCOSE 76 03/17/2014    GLUF 94 09/07/2019    CALCIUM 8 5 09/07/2019    AST 13 09/07/2019    ALT 12 09/07/2019    ALKPHOS 59 09/07/2019    PROT 7 3 03/17/2014    BILITOT 0 3 03/17/2014    EGFR 97 09/07/2019         Current Medications: Reviewed  Allergies: Reviewed  PMH/FH/SH:  Reviewed      Vital Sign:    Body surface area is 1 61 meters squared      Wt Readings from Last 3 Encounters:   03/09/21 59 kg (130 lb)   12/24/20 58 1 kg (128 lb)   09/22/20 59 3 kg (130 lb 12 8 oz)        Temp Readings from Last 3 Encounters:   03/09/21 98 °F (36 7 °C) (Tympanic Core)   12/24/20 98 7 °F (37 1 °C)   09/22/20 98 6 °F (37 °C) (Skin)        BP Readings from Last 3 Encounters:   03/09/21 104/62   12/24/20 106/60   09/22/20 120/70         Pulse Readings from Last 3 Encounters:   03/09/21 71   12/24/20 72   09/03/20 76     @LASTSAO2(3)@

## 2021-03-31 DIAGNOSIS — Z23 ENCOUNTER FOR IMMUNIZATION: ICD-10-CM

## 2021-04-06 ENCOUNTER — OFFICE VISIT (OUTPATIENT)
Dept: FAMILY MEDICINE CLINIC | Facility: CLINIC | Age: 46
End: 2021-04-06
Payer: COMMERCIAL

## 2021-04-06 VITALS
HEIGHT: 63 IN | DIASTOLIC BLOOD PRESSURE: 84 MMHG | OXYGEN SATURATION: 100 % | BODY MASS INDEX: 22.68 KG/M2 | WEIGHT: 128 LBS | SYSTOLIC BLOOD PRESSURE: 132 MMHG | TEMPERATURE: 97.8 F | HEART RATE: 90 BPM

## 2021-04-06 DIAGNOSIS — Z12.83 SKIN CANCER SCREENING: ICD-10-CM

## 2021-04-06 DIAGNOSIS — L98.9 SKIN LESION: Primary | ICD-10-CM

## 2021-04-06 PROCEDURE — 1036F TOBACCO NON-USER: CPT | Performed by: FAMILY MEDICINE

## 2021-04-06 PROCEDURE — 3725F SCREEN DEPRESSION PERFORMED: CPT | Performed by: FAMILY MEDICINE

## 2021-04-06 PROCEDURE — 3008F BODY MASS INDEX DOCD: CPT | Performed by: FAMILY MEDICINE

## 2021-04-06 PROCEDURE — 99213 OFFICE O/P EST LOW 20 MIN: CPT | Performed by: FAMILY MEDICINE

## 2021-04-06 NOTE — PROGRESS NOTES
Assessment/Plan:    No problem-specific Assessment & Plan notes found for this encounter  Diagnoses and all orders for this visit:    Skin lesion  -     Ambulatory referral to Dermatology; Future    Skin cancer screening  -     Ambulatory referral to Dermatology; Future        Appears like an SK that became inflamed and then flaked off  Will send to dermatology as patient is worried this could be skin cancer  reassurance attempted but patient is very worried  Also, would benefit from establishing with dermatology for yearly skin exams  Subjective:      Patient ID: Melba Rapp is a 41 y o  female  HPI     Patient presents to the office regarding a mole on her forehead  Notes that this was present for a few years  About 1 week ago, she was showering and noticed that the mole was gone  States that it feel of and she brought it with her today  She is concerned because it turned a darker color before falling off  There is a small dry spot in the area the mole was previously in  Denies any itch or irritation  The following portions of the patient's history were reviewed and updated as appropriate: allergies, current medications, past family history, past medical history, past social history, past surgical history and problem list     Review of Systems   Constitutional: Negative for activity change, appetite change, fatigue and fever  HENT: Negative for congestion, ear pain, rhinorrhea and sore throat  Eyes: Negative for pain  Respiratory: Negative for cough and shortness of breath  Cardiovascular: Negative for chest pain and leg swelling  Gastrointestinal: Negative for abdominal distention, abdominal pain, constipation, diarrhea, nausea and vomiting  Genitourinary: Negative for dysuria, frequency and urgency  Skin:        mole   Neurological: Negative for dizziness, light-headedness and headaches           Objective:    /84 (BP Location: Left arm, Patient Position: Sitting, Cuff Size: Adult)   Pulse 90   Temp 97 8 °F (36 6 °C)   Ht 5' 3" (1 6 m)   Wt 58 1 kg (128 lb)   SpO2 100%   BMI 22 67 kg/m²      Physical Exam  Vitals signs reviewed  Constitutional:       General: She is not in acute distress  Appearance: Normal appearance  HENT:      Head: Normocephalic and atraumatic  Right Ear: External ear normal       Left Ear: External ear normal       Mouth/Throat:      Mouth: Mucous membranes are moist    Eyes:      Extraocular Movements: Extraocular movements intact  Conjunctiva/sclera: Conjunctivae normal    Pulmonary:      Effort: Pulmonary effort is normal    Skin:     Capillary Refill: Capillary refill takes less than 2 seconds  Comments: Small, dry spot on center of forehead  No pigment changes  Neurological:      Mental Status: She is alert  Mental status is at baseline             Mele Blankenship DO  [facility]  4/6/2021 3:17 PM

## 2021-04-06 NOTE — PATIENT INSTRUCTIONS
Seborrheic Keratosis   AMBULATORY CARE:   Seborrheic keratosis  (SK) is a lesion that is commonly found on skin  The lesions are benign (not cancer)  Lesions are usually seen on the scalp, face, neck, and trunk  You may have lesions anywhere hair can grow on your body  There are usually many lesions  Some people have dozens of them  As you get older, they may become thicker, more elevated, and increased in number  Common types of SK lesions include:   · Common seborrheic keratoses  are usually on the face, neck, and trunk  They can look like warts  They can feel like velvet or wax  The lesions look like they have been stuck on your skin  Do not try to peel or scratch them off  · Dermatosis papulosa nigra  are small, black, raised pimples  They appear on your face, neck, chest, and upper back  These lesions are most common in people with darker skin  They are more common in women, and there is usually a family history of these lesions  · Stucco keratoses  are small gray lesions  They look rough and like warts  These lesions are usually found on the legs or forearms  There are usually a lot of these lesions  They are more common in men  · Flat seborrheic keratoses  are oval, brown patches on the face, chest, or arms  These lesions increase in number as you get older  · Pedunculated seborrheic keratoses  are darker lesions that look like they have a stem  These appear on the neck or armpit  Contact your healthcare provider if:   · Your lesions change shape or size  · Your lesions become red, itchy, and have drainage  · You have questions or concerns about your condition or care  Treatment for SK  is not usually needed  A lesion can be removed if it is inflamed or has changed in appearance  It may also be removed for any reason  Talk to your healthcare provider if you want the lesion removed  Cryotherapy and shave removal are the most common removal methods  More about SK:   The lesions can become irritated  Irritation can be caused by clothes rubbing against the lesions  It can also be caused by chafing of the area the lesions are in  The lesions or the skin around the lesions can become red and itchy  The lesions can also ooze drainage  © Copyright [address] Information is for End User's use only and may not be sold, redistributed or otherwise used for commercial purposes  All illustrations and images included in CareNotes® are the copyrighted property of A D A M , Inc  or [facility]   The above information is an  only  It is not intended as medical advice for individual conditions or treatments  Talk to your doctor, nurse or pharmacist before following any medical regimen to see if it is safe and effective for you

## 2021-04-09 ENCOUNTER — IMMUNIZATIONS (OUTPATIENT)
Dept: FAMILY MEDICINE CLINIC | Facility: HOSPITAL | Age: 46
End: 2021-04-09

## 2021-04-09 DIAGNOSIS — Z23 ENCOUNTER FOR IMMUNIZATION: Primary | ICD-10-CM

## 2021-04-09 PROCEDURE — 0001A SARS-COV-2 / COVID-19 MRNA VACCINE (PFIZER-BIONTECH) 30 MCG: CPT

## 2021-04-09 PROCEDURE — 91300 SARS-COV-2 / COVID-19 MRNA VACCINE (PFIZER-BIONTECH) 30 MCG: CPT

## 2021-04-30 ENCOUNTER — IMMUNIZATIONS (OUTPATIENT)
Dept: FAMILY MEDICINE CLINIC | Facility: HOSPITAL | Age: 46
End: 2021-04-30

## 2021-04-30 DIAGNOSIS — Z23 ENCOUNTER FOR IMMUNIZATION: Primary | ICD-10-CM

## 2021-04-30 PROCEDURE — 0002A SARS-COV-2 / COVID-19 MRNA VACCINE (PFIZER-BIONTECH) 30 MCG: CPT

## 2021-04-30 PROCEDURE — 91300 SARS-COV-2 / COVID-19 MRNA VACCINE (PFIZER-BIONTECH) 30 MCG: CPT

## 2021-05-28 ENCOUNTER — HOSPITAL ENCOUNTER (OUTPATIENT)
Dept: MAMMOGRAPHY | Facility: CLINIC | Age: 46
Discharge: HOME | End: 2021-05-28
Payer: COMMERCIAL

## 2021-05-28 VITALS — BODY MASS INDEX: 22.68 KG/M2 | HEIGHT: 63 IN | WEIGHT: 128 LBS

## 2021-05-28 DIAGNOSIS — Z12.31 SCREENING MAMMOGRAM, ENCOUNTER FOR: ICD-10-CM

## 2021-05-28 PROCEDURE — 77063 BREAST TOMOSYNTHESIS BI: CPT

## 2021-05-28 PROCEDURE — 77067 SCR MAMMO BI INCL CAD: CPT

## 2021-08-16 ENCOUNTER — VBI (OUTPATIENT)
Dept: ADMINISTRATIVE | Facility: OTHER | Age: 46
End: 2021-08-16

## 2021-09-07 ENCOUNTER — APPOINTMENT (OUTPATIENT)
Dept: LAB | Facility: CLINIC | Age: 46
End: 2021-09-07
Payer: COMMERCIAL

## 2021-09-07 ENCOUNTER — OFFICE VISIT (OUTPATIENT)
Dept: FAMILY MEDICINE CLINIC | Facility: CLINIC | Age: 46
End: 2021-09-07
Payer: COMMERCIAL

## 2021-09-07 VITALS
OXYGEN SATURATION: 95 % | DIASTOLIC BLOOD PRESSURE: 82 MMHG | WEIGHT: 130 LBS | HEART RATE: 64 BPM | SYSTOLIC BLOOD PRESSURE: 110 MMHG | TEMPERATURE: 96.1 F | BODY MASS INDEX: 23.04 KG/M2 | HEIGHT: 63 IN

## 2021-09-07 DIAGNOSIS — Z23 ENCOUNTER FOR IMMUNIZATION: ICD-10-CM

## 2021-09-07 DIAGNOSIS — Z00.00 ANNUAL PHYSICAL EXAM: Primary | ICD-10-CM

## 2021-09-07 DIAGNOSIS — Z00.00 ANNUAL PHYSICAL EXAM: ICD-10-CM

## 2021-09-07 LAB
CHOLEST SERPL-MCNC: 168 MG/DL (ref 50–200)
GLUCOSE P FAST SERPL-MCNC: 87 MG/DL (ref 65–99)
HDLC SERPL-MCNC: 71 MG/DL
LDLC SERPL CALC-MCNC: 83 MG/DL (ref 0–100)
NONHDLC SERPL-MCNC: 97 MG/DL
TRIGL SERPL-MCNC: 71 MG/DL

## 2021-09-07 PROCEDURE — 82947 ASSAY GLUCOSE BLOOD QUANT: CPT

## 2021-09-07 PROCEDURE — 36415 COLL VENOUS BLD VENIPUNCTURE: CPT

## 2021-09-07 PROCEDURE — 90471 IMMUNIZATION ADMIN: CPT

## 2021-09-07 PROCEDURE — 3008F BODY MASS INDEX DOCD: CPT | Performed by: FAMILY MEDICINE

## 2021-09-07 PROCEDURE — 1036F TOBACCO NON-USER: CPT | Performed by: FAMILY MEDICINE

## 2021-09-07 PROCEDURE — 90686 IIV4 VACC NO PRSV 0.5 ML IM: CPT

## 2021-09-07 PROCEDURE — 80061 LIPID PANEL: CPT

## 2021-09-07 PROCEDURE — 99396 PREV VISIT EST AGE 40-64: CPT | Performed by: FAMILY MEDICINE

## 2021-09-07 PROCEDURE — 3725F SCREEN DEPRESSION PERFORMED: CPT | Performed by: FAMILY MEDICINE

## 2021-09-07 NOTE — PROGRESS NOTES
[address]    NAME: Lily Mishra  AGE: 41 y o  SEX: female  : 1975     DATE: 2021     Assessment and Plan:     Problem List Items Addressed This Visit     None      Visit Diagnoses     Annual physical exam    -  Primary    Relevant Orders    Lipid panel    Glucose, fasting    Encounter for immunization        Relevant Orders    influenza vaccine, quadrivalent, 0 5 mL, preservative-free, for adult and pediatric patients 6 mos+ (AFLURIA, FLUARIX, FLULAVAL, FLUZONE) (Completed)        Immunizations and preventive care screenings were discussed with patient today  Appropriate education was printed on patient's after visit summary  Counseling:  Alcohol/drug use: discussed moderation in alcohol intake, the recommendations for healthy alcohol use, and avoidance of illicit drug use  Dental Health: discussed importance of regular tooth brushing, flossing, and dental visits  Sexual health: discussed sexually transmitted diseases, partner selection, use of condoms, avoidance of unintended pregnancy, and contraceptive alternatives  · Exercise: the importance of regular exercise/physical activity was discussed  Recommend exercise 3-5 times per week for at least 30 minutes  Return in 1 year (on 2022)  Chief Complaint:     Chief Complaint   Patient presents with    Physical Exam      History of Present Illness:     Adult Annual Physical   Patient here for a comprehensive physical exam  The patient reports no problems  Diet and Physical Activity  · Diet/Nutrition: well balanced diet  · Exercise: walking, moderate cardiovascular exercise and 3-4 times a week on average        Depression Screening  PHQ-9 Depression Screening    PHQ-9:   Frequency of the following problems over the past two weeks:      Little interest or pleasure in doing things: 0 - not at all  Feeling down, depressed, or hopeless: 0 - not at all  PHQ-2 Score: 0       General Health  · Sleep: sleeps well  · Hearing: normal - bilateral   · Vision: no vision problems  · Dental: regular dental visits, brushes teeth twice daily and flosses teeth occasionally  /GYN Health  · Patient is: perimenopausal  · Last menstrual period: 2020  · Contraceptive method: barrier methods  · Sexually active with one male partner  · Last pap smear around 1 year ago, next appointment on 10/5/21     Review of Systems:     Review of Systems   Constitutional: Negative for activity change, appetite change, fatigue and fever  HENT: Negative for congestion, ear pain, rhinorrhea and sore throat  Eyes: Negative for pain  Respiratory: Negative for cough and shortness of breath  Cardiovascular: Negative for chest pain and leg swelling  Gastrointestinal: Negative for abdominal distention, abdominal pain, constipation, diarrhea, nausea and vomiting  Genitourinary: Negative for dysuria, frequency and urgency  Musculoskeletal: Negative for gait problem  Skin: Negative for rash  Neurological: Negative for dizziness, light-headedness and headaches        Past Medical History:     Past Medical History:   Diagnosis Date    BRCA1 negative     BRCA2 negative     [facility]     [facility]    History of chemotherapy 2013    left breast    History of external beam radiation therapy     breast      Past Surgical History:     Past Surgical History:   Procedure Laterality Date    BREAST BIOPSY Right 10/22/2013    benign    BREAST EXCISIONAL BIOPSY Left 2013    left breast cancer    BREAST LUMPECTOMY Left      SECTION        Social History:     Social History     Socioeconomic History    Marital status: /Civil Union     Spouse name: None    Number of children: None    Years of education: None    Highest education level: None   Occupational History    None   Tobacco Use    Smoking status: Never Smoker    Smokeless tobacco: Never Used   Vaping Use    Vaping Use: Never assessed   Substance and Sexual Activity    Alcohol use: Yes     Comment: social    Drug use: No    Sexual activity: Yes     Partners: Male     Birth control/protection: Condom   Other Topics Concern    None   Social History Narrative    None     Social Determinants of Health     Financial Resource Strain:     Difficulty of Paying Living Expenses:    Food Insecurity:     Worried About Running Out of Food in the Last Year:     Ran Out of Food in the Last Year:    Transportation Needs:     Lack of Transportation (Medical):      Lack of Transportation (Non-Medical):    Physical Activity:     Days of Exercise per Week:     Minutes of Exercise per Session:    Stress:     Feeling of Stress :    Social Connections:     Frequency of Communication with Friends and Family:     Frequency of Social Gatherings with Friends and Family:     Attends Shinto Services:     Active Member of Clubs or Organizations:     Attends Club or Organization Meetings:     Marital Status:    Intimate Partner Violence:     Fear of Current or Ex-Partner:     Emotionally Abused:     Physically Abused:     Sexually Abused:       Family History:     Family History   Problem Relation Age of Onset    Prostate cancer Paternal Grandfather     No Known Problems Mother     No Known Problems Father     No Known Problems Sister     No Known Problems Paternal Aunt     No Known Problems Maternal Grandmother     No Known Problems Paternal Grandmother     Breast cancer Neg Hx       Current Medications:     Current Outpatient Medications   Medication Sig Dispense Refill    loratadine (CLARITIN) 10 mg tablet Take 1 tablet by mouth daily as needed      naproxen sodium (ALEVE) 220 MG tablet Take 2 tablets by mouth daily as needed      tamoxifen (NOLVADEX) 20 mg tablet TAKE 1 TABLET BY MOUTH EVERY DAY 90 tablet 3    Tranexamic Acid 650 MG TABS Take 2 tablets 3 times daily for up to 5 days of the menses 30 tablet 4    nitrofurantoin (MACROBID) 100 mg capsule        No current facility-administered medications for this visit  Allergies: Allergies   Allergen Reactions    Sulfa Antibiotics Hives      Physical Exam:     /82 (BP Location: Left arm, Patient Position: Sitting, Cuff Size: Adult)   Pulse 64   Temp (!) 96 1 °F (35 6 °C)   Ht 5' 3" (1 6 m)   Wt 59 kg (130 lb)   SpO2 95%   BMI 23 03 kg/m²     Physical Exam  Vitals reviewed  Constitutional:       General: She is not in acute distress  Appearance: Normal appearance  HENT:      Head: Normocephalic and atraumatic  Right Ear: External ear normal       Left Ear: External ear normal       Nose: Nose normal       Mouth/Throat:      Mouth: Mucous membranes are moist    Eyes:      Extraocular Movements: Extraocular movements intact  Conjunctiva/sclera: Conjunctivae normal       Pupils: Pupils are equal, round, and reactive to light  Cardiovascular:      Rate and Rhythm: Normal rate and regular rhythm  Heart sounds: Normal heart sounds  Pulmonary:      Effort: Pulmonary effort is normal       Breath sounds: Normal breath sounds  Abdominal:      General: Bowel sounds are normal  There is no distension  Palpations: Abdomen is soft  Tenderness: There is no abdominal tenderness  Musculoskeletal:      Cervical back: Neck supple  Right lower leg: No edema  Left lower leg: No edema  Lymphadenopathy:      Cervical: No cervical adenopathy  Skin:     General: Skin is warm  Capillary Refill: Capillary refill takes less than 2 seconds  Findings: No rash  Neurological:      Mental Status: She is alert  Mental status is at baseline          Dahlia Rudd DO  [address]

## 2021-09-07 NOTE — PATIENT INSTRUCTIONS

## 2021-10-05 ENCOUNTER — ANNUAL EXAM (OUTPATIENT)
Dept: OBGYN CLINIC | Facility: CLINIC | Age: 46
End: 2021-10-05
Payer: COMMERCIAL

## 2021-10-05 VITALS
DIASTOLIC BLOOD PRESSURE: 72 MMHG | WEIGHT: 131 LBS | HEIGHT: 63 IN | BODY MASS INDEX: 23.21 KG/M2 | SYSTOLIC BLOOD PRESSURE: 114 MMHG

## 2021-10-05 DIAGNOSIS — Z01.419 ENCOUNTER FOR GYNECOLOGICAL EXAMINATION: Primary | ICD-10-CM

## 2021-10-05 PROCEDURE — G0145 SCR C/V CYTO,THINLAYER,RESCR: HCPCS | Performed by: OBSTETRICS & GYNECOLOGY

## 2021-10-05 PROCEDURE — 99396 PREV VISIT EST AGE 40-64: CPT | Performed by: OBSTETRICS & GYNECOLOGY

## 2021-10-05 PROCEDURE — 3008F BODY MASS INDEX DOCD: CPT | Performed by: FAMILY MEDICINE

## 2021-10-05 PROCEDURE — G0476 HPV COMBO ASSAY CA SCREEN: HCPCS | Performed by: OBSTETRICS & GYNECOLOGY

## 2021-10-07 LAB
HPV HR 12 DNA CVX QL NAA+PROBE: NEGATIVE
HPV16 DNA CVX QL NAA+PROBE: NEGATIVE
HPV18 DNA CVX QL NAA+PROBE: NEGATIVE

## 2021-10-11 LAB
LAB AP GYN PRIMARY INTERPRETATION: NORMAL
Lab: NORMAL

## 2021-12-22 ENCOUNTER — OFFICE VISIT (OUTPATIENT)
Dept: SURGICAL ONCOLOGY | Facility: CLINIC | Age: 46
End: 2021-12-22
Payer: COMMERCIAL

## 2021-12-22 VITALS
WEIGHT: 129 LBS | HEIGHT: 63 IN | OXYGEN SATURATION: 99 % | RESPIRATION RATE: 14 BRPM | SYSTOLIC BLOOD PRESSURE: 118 MMHG | TEMPERATURE: 97.8 F | DIASTOLIC BLOOD PRESSURE: 78 MMHG | BODY MASS INDEX: 22.86 KG/M2 | HEART RATE: 80 BPM

## 2021-12-22 DIAGNOSIS — Z17.0 MALIGNANT NEOPLASM OF LOWER-INNER QUADRANT OF LEFT BREAST IN FEMALE, ESTROGEN RECEPTOR POSITIVE (HCC): Primary | ICD-10-CM

## 2021-12-22 DIAGNOSIS — Z79.810 USE OF TAMOXIFEN (NOLVADEX): ICD-10-CM

## 2021-12-22 DIAGNOSIS — Z12.31 SCREENING MAMMOGRAM, ENCOUNTER FOR: ICD-10-CM

## 2021-12-22 DIAGNOSIS — C50.312 MALIGNANT NEOPLASM OF LOWER-INNER QUADRANT OF LEFT BREAST IN FEMALE, ESTROGEN RECEPTOR POSITIVE (HCC): Primary | ICD-10-CM

## 2021-12-22 PROCEDURE — 99214 OFFICE O/P EST MOD 30 MIN: CPT | Performed by: SURGERY

## 2021-12-22 PROCEDURE — 3008F BODY MASS INDEX DOCD: CPT | Performed by: SURGERY

## 2021-12-22 PROCEDURE — 1036F TOBACCO NON-USER: CPT | Performed by: SURGERY

## 2022-02-21 DIAGNOSIS — Z17.0 MALIGNANT NEOPLASM OF LEFT BREAST IN FEMALE, ESTROGEN RECEPTOR POSITIVE, UNSPECIFIED SITE OF BREAST (HCC): ICD-10-CM

## 2022-02-21 DIAGNOSIS — C50.912 MALIGNANT NEOPLASM OF LEFT BREAST IN FEMALE, ESTROGEN RECEPTOR POSITIVE, UNSPECIFIED SITE OF BREAST (HCC): ICD-10-CM

## 2022-02-21 RX ORDER — TAMOXIFEN CITRATE 20 MG/1
TABLET ORAL
Qty: 90 TABLET | Refills: 3 | Status: SHIPPED | OUTPATIENT
Start: 2022-02-21

## 2022-03-08 ENCOUNTER — OFFICE VISIT (OUTPATIENT)
Dept: HEMATOLOGY ONCOLOGY | Facility: CLINIC | Age: 47
End: 2022-03-08
Payer: COMMERCIAL

## 2022-03-08 VITALS
BODY MASS INDEX: 23.21 KG/M2 | HEART RATE: 76 BPM | OXYGEN SATURATION: 99 % | DIASTOLIC BLOOD PRESSURE: 64 MMHG | RESPIRATION RATE: 18 BRPM | TEMPERATURE: 97.6 F | WEIGHT: 131 LBS | HEIGHT: 63 IN | SYSTOLIC BLOOD PRESSURE: 110 MMHG

## 2022-03-08 DIAGNOSIS — Z17.0 MALIGNANT NEOPLASM OF LOWER-INNER QUADRANT OF LEFT BREAST IN FEMALE, ESTROGEN RECEPTOR POSITIVE (HCC): Primary | ICD-10-CM

## 2022-03-08 DIAGNOSIS — C50.312 MALIGNANT NEOPLASM OF LOWER-INNER QUADRANT OF LEFT BREAST IN FEMALE, ESTROGEN RECEPTOR POSITIVE (HCC): Primary | ICD-10-CM

## 2022-03-08 PROCEDURE — 3008F BODY MASS INDEX DOCD: CPT | Performed by: INTERNAL MEDICINE

## 2022-03-08 PROCEDURE — 99214 OFFICE O/P EST MOD 30 MIN: CPT | Performed by: INTERNAL MEDICINE

## 2022-03-08 NOTE — PROGRESS NOTES
Hematology / Oncology Outpatient Follow Up Note    Melba Rapp 40 y o  female :1975 JU         Date:  3/8/2022    Assessment / Plan:  A 37 year old premenopausal woman with stage IA left breast cancer, grade 2, ER/VA positive, HER-2 negative disease  Her disease was high risk, based on MammaPrint  She was treated with adjuvant chemotherapy with Taxotere and cyclophosphamide x4 cycles  Currently, she is on tamoxifen without significant side effects  She has no evidence recurrent disease, based on her symptoms and physical examinations  I recommended her to continue tamoxifen 20 mg daily until   She is in agreement with my recommendation  I will see her again in a year for routine follow-up         Subjective:      HPI:  A 41year old premenopausal woman, who noticed a lump in the medial lower aspect of her left breast in 2013  She brought this to the medical attention  She was found to have abnormality based on imaging study  She underwent ultrasound-guided biopsy in 2013, which showed invasive mammary carcinoma, ER/VA positive, HER-2 negative disease  She also underwent right breast biopsy, as well as second biopsy of dislocation in the left breast  Both of which were negative for malignancy  She underwent lumpectomy with sentinel lymph node biopsy in 2013  Pathology revealed that 1 1 cm of invasive ductal carcinoma, grade 2  There is no evidence of lymphovascular invasion  3 sentinel lymph node were all negative for metastatic disease  This was ER/VA positive, HER-2 negative disease  Her tumor tissue was sent for MammaPrint, which came back recently as high risk disease  Because of her young age, she underwent BRCA1/2 testing which showed no evidence of mutation  She has no family history of breast cancer or ovarian cancer  She presents today for initial consultation regarding adjuvant treatment  She feels well with no complaint of pain   She has no weight change  Recently  She has no respiratory symptoms  She has regular menstrual cycles  She has no other comorbidities              Interval History:  A 40year-old premenopausal woman, who has no evidence of BRCA mutation  She was diagnosed with stage IA left breast cancer, grade 2, ER/MS positive, HER-2 negative disease in November 2013  Her tumor was found to be high risk, based on the MammaPrint  Therefore, she was treated with adjuvant chemotherapy with Taxotere and cyclophosphamide x4 cycles  Adjuvant chemotherapy was completed in March 2014 with no significant toxicity   she presents today for routine follow-up  She is currently on adjuvant hormonal therapy with tamoxifen with excellent tolerance  She continued to have mild hot flashes during the night  Otherwise, she feels well  She has no menstrual bleeding since a year ago  She denied any respiratory symptoms  She has no complaint of bone pain  Her weight is stable  She has normal performance status         Objective:      Primary Diagnosis:     1  Left breast cancer  Stage IA(pT1c, pN0,M0), grade 2  ER/MS positive, HER-2 negative disease  MammaPrint, high risk  Diagnosed in November 2013  2  BRCA mutation negative       Cancer Staging:  No matching staging information was found for the patient         Previous Hematologic/ Oncologic Treatment:      Adjuvant chemotherapy with Taxotere and cyclophosphamide x4, cycle  Completed in March 2014       Current Hematologic/ Oncologic Treatment:       Adjuvant hormonal therapy with tamoxifen 20 mg once a day since April 2014       Disease Status:      SHINE status post lumpectomy with sentinel lymph node biopsy       Test Results:     Pathology:      1 1 cm of invasive ductal carcinoma, grade 2  No evidence of lymphovascular invasion  3 sentinel lymph nodes were negative for metastatic disease  This was ER/MS positive, HER-2 negative disease  MammaPrint, high risk   Stage IA (pT1c, pN0,M0)        Radiology:     Mammography in  May 2021  was benign  BI-RADS 1      Laboratory:           Physical Exam:        General Appearance:    Alert, oriented          Eyes:    PERRL   Ears:    Normal external ear canals, both ears   Nose:   Nares normal, septum midline   Throat:   Mucosa moist  Pharynx without injection  Neck:   Supple         Lungs:     Clear to auscultation bilaterally   Chest Wall:    No tenderness or deformity    Heart:    Regular rate and rhythm         Abdomen:     Soft, non-tender, bowel sounds +, no organomegaly               Extremities:   Extremities no cyanosis or edema         Skin:   no rash or icterus  Lymph nodes:   Cervical, supraclavicular, and axillary nodes normal   Neurologic:   CNII-XII intact, normal strength, sensation and reflexes     Throughout             Breast exam:    lumpectomy scar in the inner lower quadrant of the left breast with no palpable abnormalities  Right breast is negative             ROS: Review of Systems   All other systems reviewed and are negative  Imaging: No results found  Labs:   Lab Results   Component Value Date    WBC 5 81 05/12/2014    HGB 12 1 05/12/2014    HCT 38 1 05/12/2014    MCV 94 05/12/2014     05/12/2014     Lab Results   Component Value Date     03/17/2014    K 4 8 09/07/2019     09/07/2019    CO2 25 09/07/2019    ANIONGAP 10 2 03/17/2014    BUN 18 09/07/2019    CREATININE 0 76 09/07/2019    GLUCOSE 76 03/17/2014    GLUF 87 09/07/2021    CALCIUM 8 5 09/07/2019    AST 13 09/07/2019    ALT 12 09/07/2019    ALKPHOS 59 09/07/2019    PROT 7 3 03/17/2014    BILITOT 0 3 03/17/2014    EGFR 97 09/07/2019         Current Medications: Reviewed  Allergies: Reviewed  PMH/FH/SH:  Reviewed      Vital Sign:    Body surface area is 1 62 meters squared      Wt Readings from Last 3 Encounters:   03/08/22 59 4 kg (131 lb)   12/22/21 58 5 kg (129 lb)   10/05/21 59 4 kg (131 lb)        Temp Readings from Last 3 Encounters:   03/08/22 97 6 °F (36 4 °C) (Tympanic Core)   12/22/21 97 8 °F (36 6 °C)   09/07/21 (!) 96 1 °F (35 6 °C)        BP Readings from Last 3 Encounters:   03/08/22 110/64   12/22/21 118/78   10/05/21 114/72         Pulse Readings from Last 3 Encounters:   03/08/22 76   12/22/21 80   09/07/21 64     @LASTSAO2(3)@

## 2022-05-31 ENCOUNTER — TELEPHONE (OUTPATIENT)
Dept: OBGYN CLINIC | Facility: CLINIC | Age: 47
End: 2022-05-31

## 2022-05-31 ENCOUNTER — HOSPITAL ENCOUNTER (OUTPATIENT)
Dept: MAMMOGRAPHY | Facility: CLINIC | Age: 47
Discharge: HOME | End: 2022-05-31
Payer: COMMERCIAL

## 2022-05-31 VITALS — BODY MASS INDEX: 23.21 KG/M2 | WEIGHT: 131 LBS | HEIGHT: 63 IN

## 2022-05-31 DIAGNOSIS — Z12.31 SCREENING MAMMOGRAM, ENCOUNTER FOR: ICD-10-CM

## 2022-05-31 DIAGNOSIS — N30.00 ACUTE CYSTITIS WITHOUT HEMATURIA: Primary | ICD-10-CM

## 2022-05-31 PROCEDURE — 77067 SCR MAMMO BI INCL CAD: CPT

## 2022-05-31 PROCEDURE — 77063 BREAST TOMOSYNTHESIS BI: CPT

## 2022-05-31 NOTE — TELEPHONE ENCOUNTER
Pt had a uti over 1 week ago and called [facility]  They gave her macrobid and she did get better  She also had blood in urine  Sx came back on Sat of pressure and discomfort  I put in rx for culture and urine   Pt cannot go till tomorrow

## 2022-06-01 ENCOUNTER — APPOINTMENT (OUTPATIENT)
Dept: LAB | Facility: CLINIC | Age: 47
End: 2022-06-01
Payer: COMMERCIAL

## 2022-06-01 DIAGNOSIS — N30.00 ACUTE CYSTITIS WITHOUT HEMATURIA: ICD-10-CM

## 2022-06-01 LAB
BACTERIA UR QL AUTO: NORMAL /HPF
BILIRUB UR QL STRIP: NEGATIVE
CLARITY UR: CLEAR
COLOR UR: NORMAL
GLUCOSE UR STRIP-MCNC: NEGATIVE MG/DL
HGB UR QL STRIP.AUTO: NEGATIVE
KETONES UR STRIP-MCNC: NEGATIVE MG/DL
LEUKOCYTE ESTERASE UR QL STRIP: NEGATIVE
NITRITE UR QL STRIP: NEGATIVE
NON-SQ EPI CELLS URNS QL MICRO: NORMAL /HPF
PH UR STRIP.AUTO: 7 [PH]
PROT UR STRIP-MCNC: NEGATIVE MG/DL
RBC #/AREA URNS AUTO: NORMAL /HPF
SP GR UR STRIP.AUTO: 1.01 (ref 1–1.03)
UROBILINOGEN UR STRIP-ACNC: <2 MG/DL
WBC #/AREA URNS AUTO: NORMAL /HPF

## 2022-06-01 PROCEDURE — 81001 URINALYSIS AUTO W/SCOPE: CPT

## 2022-06-01 PROCEDURE — 87086 URINE CULTURE/COLONY COUNT: CPT

## 2022-06-03 ENCOUNTER — TELEPHONE (OUTPATIENT)
Dept: OBGYN CLINIC | Facility: CLINIC | Age: 47
End: 2022-06-03

## 2022-06-03 LAB — BACTERIA UR CULT: NORMAL

## 2022-06-03 NOTE — TELEPHONE ENCOUNTER
----- Message from Dave Hart MD sent at 6/3/2022 10:31 AM EDT -----  This urine sample does not indicated infection  Has Saray's symptoms improved?

## 2022-06-03 NOTE — TELEPHONE ENCOUNTER
I would recommend usual measures such as hydration and continue to monitor her symptoms       IF worsen or if she suspects alternate cause such as vaginal infection would recommend office visit

## 2022-06-03 NOTE — TELEPHONE ENCOUNTER
Spoke to pt and she has had these issues in past with urine showing neg and pressure  She had a urologist in past for kidney stones  Recommended maybe reaching out to them again since she has this issue often  She will do that

## 2022-09-08 ENCOUNTER — APPOINTMENT (OUTPATIENT)
Dept: LAB | Facility: CLINIC | Age: 47
End: 2022-09-08
Payer: COMMERCIAL

## 2022-09-08 ENCOUNTER — OFFICE VISIT (OUTPATIENT)
Dept: FAMILY MEDICINE CLINIC | Facility: CLINIC | Age: 47
End: 2022-09-08
Payer: COMMERCIAL

## 2022-09-08 VITALS
BODY MASS INDEX: 22.86 KG/M2 | TEMPERATURE: 97.2 F | HEIGHT: 63 IN | OXYGEN SATURATION: 99 % | DIASTOLIC BLOOD PRESSURE: 68 MMHG | SYSTOLIC BLOOD PRESSURE: 108 MMHG | HEART RATE: 69 BPM | WEIGHT: 129 LBS

## 2022-09-08 DIAGNOSIS — Z00.00 ANNUAL PHYSICAL EXAM: ICD-10-CM

## 2022-09-08 DIAGNOSIS — D17.1 LIPOMA OF TORSO: ICD-10-CM

## 2022-09-08 DIAGNOSIS — Z00.00 ANNUAL PHYSICAL EXAM: Primary | ICD-10-CM

## 2022-09-08 LAB
ANION GAP SERPL CALCULATED.3IONS-SCNC: 4 MMOL/L (ref 4–13)
BUN SERPL-MCNC: 13 MG/DL (ref 5–25)
CALCIUM SERPL-MCNC: 9.4 MG/DL (ref 8.3–10.1)
CHLORIDE SERPL-SCNC: 107 MMOL/L (ref 96–108)
CHOLEST SERPL-MCNC: 160 MG/DL
CO2 SERPL-SCNC: 26 MMOL/L (ref 21–32)
CREAT SERPL-MCNC: 0.73 MG/DL (ref 0.6–1.3)
GFR SERPL CREATININE-BSD FRML MDRD: 99 ML/MIN/1.73SQ M
GLUCOSE P FAST SERPL-MCNC: 89 MG/DL (ref 65–99)
HDLC SERPL-MCNC: 74 MG/DL
LDLC SERPL CALC-MCNC: 73 MG/DL (ref 0–100)
NONHDLC SERPL-MCNC: 86 MG/DL
POTASSIUM SERPL-SCNC: 4.1 MMOL/L (ref 3.5–5.3)
SODIUM SERPL-SCNC: 137 MMOL/L (ref 135–147)
TRIGL SERPL-MCNC: 65 MG/DL

## 2022-09-08 PROCEDURE — 99396 PREV VISIT EST AGE 40-64: CPT | Performed by: FAMILY MEDICINE

## 2022-09-08 PROCEDURE — 80048 BASIC METABOLIC PNL TOTAL CA: CPT

## 2022-09-08 PROCEDURE — 3725F SCREEN DEPRESSION PERFORMED: CPT | Performed by: FAMILY MEDICINE

## 2022-09-08 PROCEDURE — 80061 LIPID PANEL: CPT

## 2022-09-08 PROCEDURE — 36415 COLL VENOUS BLD VENIPUNCTURE: CPT

## 2022-09-08 NOTE — PATIENT INSTRUCTIONS

## 2022-09-08 NOTE — PROGRESS NOTES
[place] [id number] [address]    NAME: Preston Camacho  AGE: 40 y o  SEX: female  : 1975     DATE: 2022     Assessment and Plan:     Problem List Items Addressed This Visit    None     Visit Diagnoses     Annual physical exam    -  Primary    Relevant Orders    Lipid panel    Basic metabolic panel    Lipoma of torso        Relevant Orders    Ambulatory Referral to General Surgery        Immunizations and preventive care screenings were discussed with patient today  Appropriate education was printed on patient's after visit summary  Counseling:  Alcohol/drug use: discussed moderation in alcohol intake, the recommendations for healthy alcohol use, and avoidance of illicit drug use  Dental Health: discussed importance of regular tooth brushing, flossing, and dental visits  Sexual health: discussed sexually transmitted diseases, partner selection, use of condoms, avoidance of unintended pregnancy, and contraceptive alternatives  Exercise: the importance of regular exercise/physical activity was discussed  Recommend exercise 3-5 times per week for at least 30 minutes  Depression Screening and Follow-up Plan: Patient was screened for depression during today's encounter  They screened negative with a PHQ-2 score of 0  Return in 1 year (on 2023)  Chief Complaint:     Chief Complaint   Patient presents with    Physical Exam     No conerns/ form to be filled out  History of Present Illness:     Adult Annual Physical   Patient here for a comprehensive physical exam  The patient reports no problems  Note sthat she has a swelling in her left mid sternal area that is soft and mobile  This ahs been present for awhile and oncology has evaluted this prevosouly with no concerns  Patient states that it has enlarged and she ois looking to have it removeed       Diet and Physical Activity  Diet/Nutrition: well balanced diet  Exercise: walking, moderate cardiovascular exercise, strength training exercises, 5-7 times a week on average and 30-60 minutes on average  Depression Screening  PHQ-2/9 Depression Screening    Little interest or pleasure in doing things: 0 - not at all  Feeling down, depressed, or hopeless: 0 - not at all  PHQ-2 Score: 0  PHQ-2 Interpretation: Negative depression screen       General Health  Sleep: sleeps well  Hearing: normal - bilateral   Vision: vision problems: started using cheaters  Dental: regular dental visits, brushes teeth twice daily and flosses teeth occasionally  /GYN Health  Patient is: postmenopausal  Last menstrual period: 2020  Contraceptive method: barrier methods  Review of Systems:     Review of Systems   Constitutional: Negative for activity change, appetite change, fatigue and fever  HENT: Negative for congestion, ear pain, rhinorrhea and sore throat  Eyes: Negative for pain  Respiratory: Negative for cough and shortness of breath  Cardiovascular: Negative for chest pain and leg swelling  Gastrointestinal: Negative for abdominal distention, abdominal pain, constipation, diarrhea, nausea and vomiting  Genitourinary: Negative for dysuria, frequency and urgency  Musculoskeletal: Negative for gait problem  Skin: Negative for rash  Neurological: Negative for dizziness, light-headedness and headaches        Past Medical History:     Past Medical History:   Diagnosis Date    BRCA1 negative     BRCA2 negative     Breast cancer ([facility] [address] )     left breast    History of chemotherapy     left breast    History of external beam radiation therapy     breast      Past Surgical History:     Past Surgical History:   Procedure Laterality Date    BREAST BIOPSY Right 10/22/2013    benign    BREAST EXCISIONAL BIOPSY Left 2013    left breast cancer    BREAST LUMPECTOMY Left      SECTION Social History:     Social History     Socioeconomic History    Marital status: /Civil Union     Spouse name: None    Number of children: None    Years of education: None    Highest education level: None   Occupational History    None   Tobacco Use    Smoking status: Never Smoker    Smokeless tobacco: Never Used   Vaping Use    Vaping Use: None   Substance and Sexual Activity    Alcohol use: Yes     Comment: social    Drug use: No    Sexual activity: Yes     Partners: Male     Birth control/protection: Condom   Other Topics Concern    None   Social History Narrative    None     Social Determinants of Health     Financial Resource Strain: Not on file   Food Insecurity: Not on file   Transportation Needs: Not on file   Physical Activity: Not on file   Stress: Not on file   Social Connections: Not on file   Intimate Partner Violence: Not on file   Housing Stability: Not on file      Family History:     Family History   Problem Relation Age of Onset    No Known Problems Mother     Prostate cancer Father     No Known Problems Sister     No Known Problems Maternal Grandmother     No Known Problems Paternal Grandmother     Prostate cancer Paternal Grandfather     No Known Problems Paternal Aunt     Breast cancer Neg Hx     Ovarian cancer Neg Hx     Colon cancer Neg Hx       Current Medications:     Current Outpatient Medications   Medication Sig Dispense Refill    loratadine (CLARITIN) 10 mg tablet Take 1 tablet by mouth daily as needed      naproxen sodium (ALEVE) 220 MG tablet Take 2 tablets by mouth daily as needed      tamoxifen (NOLVADEX) 20 mg tablet TAKE 1 TABLET BY MOUTH EVERY DAY 90 tablet 3    Tranexamic Acid 650 MG TABS Take 2 tablets 3 times daily for up to 5 days of the menses 30 tablet 4     No current facility-administered medications for this visit  Allergies:      Allergies   Allergen Reactions    Sulfa Antibiotics Hives      Physical Exam:     /68   Pulse 69 Temp (!) 97 2 °F (36 2 °C)   Ht 5' 3" (1 6 m)   Wt 58 5 kg (129 lb)   LMP 11/26/2020   SpO2 99%   BMI 22 85 kg/m²     Physical Exam  Vitals reviewed  Constitutional:       General: She is not in acute distress  Appearance: Normal appearance  HENT:      Head: Normocephalic and atraumatic  Right Ear: External ear normal       Left Ear: External ear normal       Nose: Nose normal       Mouth/Throat:      Mouth: Mucous membranes are moist    Eyes:      Extraocular Movements: Extraocular movements intact  Conjunctiva/sclera: Conjunctivae normal       Pupils: Pupils are equal, round, and reactive to light  Cardiovascular:      Rate and Rhythm: Normal rate and regular rhythm  Heart sounds: Normal heart sounds  Pulmonary:      Effort: Pulmonary effort is normal       Breath sounds: Normal breath sounds  Abdominal:      General: Bowel sounds are normal  There is no distension  Palpations: Abdomen is soft  Tenderness: There is no abdominal tenderness  Musculoskeletal:      Cervical back: Neck supple  Right lower leg: No edema  Left lower leg: No edema  Lymphadenopathy:      Cervical: No cervical adenopathy  Skin:     General: Skin is warm  Capillary Refill: Capillary refill takes less than 2 seconds  Findings: No rash  Comments: Soft, mobile, approximately 3 cm mass appreciated along the left sternal border around ribs 3-4   Neurological:      Mental Status: She is alert  Mental status is at baseline          Anton Zuluaga DO  [address]

## 2022-09-09 ENCOUNTER — TELEPHONE (OUTPATIENT)
Dept: FAMILY MEDICINE CLINIC | Facility: CLINIC | Age: 47
End: 2022-09-09

## 2022-09-12 ENCOUNTER — TELEPHONE (OUTPATIENT)
Dept: FAMILY MEDICINE CLINIC | Facility: CLINIC | Age: 47
End: 2022-09-12

## 2022-09-12 NOTE — TELEPHONE ENCOUNTER
Notified pt form left in office with Dr En Chawla was completed and faxed    Made copy for chart, pt will  original at , placed in  folder

## 2022-09-20 ENCOUNTER — TELEPHONE (OUTPATIENT)
Dept: SURGERY | Facility: CLINIC | Age: 47
End: 2022-09-20

## 2022-09-20 NOTE — TELEPHONE ENCOUNTER
[employer] [place] [employer]  No referral required  Rep: Ulysses Greaves  Ref:  [id number]  Active as of 1/1/2018  [facility] in [employer] in network  jason out of network       -" in & out of network benefits"

## 2022-09-23 ENCOUNTER — CONSULT (OUTPATIENT)
Dept: SURGERY | Facility: CLINIC | Age: 47
End: 2022-09-23
Payer: COMMERCIAL

## 2022-09-23 VITALS
HEIGHT: 63 IN | OXYGEN SATURATION: 99 % | DIASTOLIC BLOOD PRESSURE: 78 MMHG | HEART RATE: 105 BPM | WEIGHT: 129 LBS | SYSTOLIC BLOOD PRESSURE: 114 MMHG | BODY MASS INDEX: 22.86 KG/M2

## 2022-09-23 DIAGNOSIS — D17.1 LIPOMA OF TORSO: ICD-10-CM

## 2022-09-23 PROCEDURE — 99203 OFFICE O/P NEW LOW 30 MIN: CPT | Performed by: SURGERY

## 2022-09-23 RX ORDER — SODIUM CHLORIDE, SODIUM LACTATE, POTASSIUM CHLORIDE, CALCIUM CHLORIDE 600; 310; 30; 20 MG/100ML; MG/100ML; MG/100ML; MG/100ML
125 INJECTION, SOLUTION INTRAVENOUS
Status: CANCELLED | OUTPATIENT
Start: 2022-09-23 | End: 2022-09-24

## 2022-09-23 NOTE — H&P (VIEW-ONLY)
Consult- General Surgery   Marco A Ramires 40 y o  female MRN: [id number]  Unit/Bed#:  Encounter: [id number]    Assessment/Plan     Assessment:  Lipoma chest wall  History of breast cancer  Plan:  In light of the size of the chest wall mass I advised the patient to undergo excision in the near future  I discussed the operative procedure, risks, benefits, alternatives and possible complications, she understood and agreed to proceed  History of Present Illness     HPI:  Marco A Ramires is a 40 y o  female who presents to my office accompanied by her  for evaluation of chest wall mass  The patient noted a lump on the chest wall for many years, she noted that the lump has increased in size for which she is very conscious about it  She was seen by her primary care physician and referred to us for surgical evaluation  She does not recall any single event the provoked the mass  She has not have any similar lesions in the past   Patient has a history of breast cancer  Review of Systems: The rest of the review of system total of 10 were negative except for the HPI      Historical Information   Past Medical History:   Diagnosis Date   • BRCA1 negative    • BRCA2 negative    • Breast cancer ([facility] [address] )     left breast   • History of chemotherapy 2013    left breast   • History of external beam radiation therapy     breast     Past Surgical History:   Procedure Laterality Date   • BREAST BIOPSY Right 10/22/2013    benign   • BREAST EXCISIONAL BIOPSY Left 2013    left breast cancer   • BREAST LUMPECTOMY Left    •  SECTION       Social History   Social History     Substance and Sexual Activity   Alcohol Use Yes    Comment: social     Social History     Substance and Sexual Activity   Drug Use No     Social History     Tobacco Use   Smoking Status Never Smoker   Smokeless Tobacco Never Used     Family History: non-contributory    Meds/Allergies   all medications and allergies reviewed     Current Outpatient Medications:   •  loratadine (CLARITIN) 10 mg tablet, Take 1 tablet by mouth daily as needed, Disp: , Rfl:   •  naproxen sodium (ALEVE) 220 MG tablet, Take 2 tablets by mouth daily as needed, Disp: , Rfl:   •  tamoxifen (NOLVADEX) 20 mg tablet, TAKE 1 TABLET BY MOUTH EVERY DAY, Disp: 90 tablet, Rfl: 3  •  Tranexamic Acid 650 MG TABS, Take 2 tablets 3 times daily for up to 5 days of the menses, Disp: 30 tablet, Rfl: 4  Allergies   Allergen Reactions   • Sulfa Antibiotics Hives       Objective     Current Vitals:   Blood Pressure: 114/78 (09/23/22 0825)  Pulse: 105 (09/23/22 0825)  Height: 5' 3" (160 cm) (09/23/22 0825)  Weight - Scale: 58 5 kg (129 lb) (09/23/22 0825)  SpO2: 99 % (09/23/22 0825)    Physical Exam  Vitals and nursing note reviewed  Constitutional:       General: She is not in acute distress  Cardiovascular:      Rate and Rhythm: Normal rate and regular rhythm  Pulmonary:      Effort: No respiratory distress  Breath sounds: Normal breath sounds  Abdominal:      Palpations: Abdomen is soft  There is no mass  Tenderness: There is no abdominal tenderness  Skin:     General: Skin is warm  Coloration: Skin is not jaundiced  Findings: No erythema or rash  Comments: Soft tissue mass on the left superior anterior chest wall, just below the clavicle, freely mobile, measure approximately 4 5 cm in the largest diameter, no skin color changes, no erythema, no increased temperature or drainage  Neurological:      Mental Status: She is alert and oriented to person, place, and time  Cranial Nerves: No cranial nerve deficit     Psychiatric:         Mood and Affect: Mood normal          Behavior: Behavior normal

## 2022-09-23 NOTE — PROGRESS NOTES
Consult- General Surgery   Drew Del Valle 40 y o  female MRN: [id number]  Unit/Bed#:  Encounter: [id number]    Assessment/Plan     Assessment:  Lipoma chest wall  History of breast cancer  Plan:  In light of the size of the chest wall mass I advised the patient to undergo excision in the near future  I discussed the operative procedure, risks, benefits, alternatives and possible complications, she understood and agreed to proceed  History of Present Illness     HPI:  Drew Del Valle is a 40 y o  female who presents to my office accompanied by her  for evaluation of chest wall mass  The patient noted a lump on the chest wall for many years, she noted that the lump has increased in size for which she is very conscious about it  She was seen by her primary care physician and referred to us for surgical evaluation  She does not recall any single event the provoked the mass  She has not have any similar lesions in the past   Patient has a history of breast cancer  Review of Systems: The rest of the review of system total of 10 were negative except for the HPI      Historical Information   Past Medical History:   Diagnosis Date    BRCA1 negative     BRCA2 negative     Breast cancer ([facility] [place] [address] [facility]     left breast    History of chemotherapy     left breast    History of external beam radiation therapy     breast     Past Surgical History:   Procedure Laterality Date    BREAST BIOPSY Right 10/22/2013    benign    BREAST EXCISIONAL BIOPSY Left 2013    left breast cancer    BREAST LUMPECTOMY Left      SECTION       Social History   Social History     Substance and Sexual Activity   Alcohol Use Yes    Comment: social     Social History     Substance and Sexual Activity   Drug Use No     Social History     Tobacco Use   Smoking Status Never Smoker   Smokeless Tobacco Never Used     Family History: non-contributory    Meds/Allergies   all medications and allergies reviewed     Current Outpatient Medications:     loratadine (CLARITIN) 10 mg tablet, Take 1 tablet by mouth daily as needed, Disp: , Rfl:     naproxen sodium (ALEVE) 220 MG tablet, Take 2 tablets by mouth daily as needed, Disp: , Rfl:     tamoxifen (NOLVADEX) 20 mg tablet, TAKE 1 TABLET BY MOUTH EVERY DAY, Disp: 90 tablet, Rfl: 3    Tranexamic Acid 650 MG TABS, Take 2 tablets 3 times daily for up to 5 days of the menses, Disp: 30 tablet, Rfl: 4  Allergies   Allergen Reactions    Sulfa Antibiotics Hives       Objective     Current Vitals:   Blood Pressure: 114/78 (09/23/22 0825)  Pulse: 105 (09/23/22 0825)  Height: 5' 3" (160 cm) (09/23/22 0825)  Weight - Scale: 58 5 kg (129 lb) (09/23/22 0825)  SpO2: 99 % (09/23/22 0825)    Physical Exam  Vitals and nursing note reviewed  Constitutional:       General: She is not in acute distress  Cardiovascular:      Rate and Rhythm: Normal rate and regular rhythm  Pulmonary:      Effort: No respiratory distress  Breath sounds: Normal breath sounds  Abdominal:      Palpations: Abdomen is soft  There is no mass  Tenderness: There is no abdominal tenderness  Skin:     General: Skin is warm  Coloration: Skin is not jaundiced  Findings: No erythema or rash  Comments: Soft tissue mass on the left superior anterior chest wall, just below the clavicle, freely mobile, measure approximately 4 5 cm in the largest diameter, no skin color changes, no erythema, no increased temperature or drainage  Neurological:      Mental Status: She is alert and oriented to person, place, and time  Cranial Nerves: No cranial nerve deficit     Psychiatric:         Mood and Affect: Mood normal          Behavior: Behavior normal

## 2022-10-11 ENCOUNTER — ANNUAL EXAM (OUTPATIENT)
Dept: OBGYN CLINIC | Facility: CLINIC | Age: 47
End: 2022-10-11
Payer: COMMERCIAL

## 2022-10-11 VITALS
WEIGHT: 132.4 LBS | DIASTOLIC BLOOD PRESSURE: 76 MMHG | BODY MASS INDEX: 23.46 KG/M2 | HEIGHT: 63 IN | SYSTOLIC BLOOD PRESSURE: 118 MMHG

## 2022-10-11 DIAGNOSIS — Z12.31 SCREENING MAMMOGRAM, ENCOUNTER FOR: ICD-10-CM

## 2022-10-11 DIAGNOSIS — Z01.419 ENCOUNTER FOR GYNECOLOGICAL EXAMINATION: Primary | ICD-10-CM

## 2022-10-11 PROCEDURE — 99396 PREV VISIT EST AGE 40-64: CPT | Performed by: OBSTETRICS & GYNECOLOGY

## 2022-10-11 NOTE — PROGRESS NOTES
Assessment/Plan:    Encounter for gynecological examination  Pap/HPV current  Mammogram: current  Encourage healthy diet, exercise, Calcium 1200mg per day and at least 800 iu Vitamin D daily  Early onset menopause due to chemoradiation/tamoxifen  -if bleeding occurs will need evaluation         Diagnoses and all orders for this visit:    Encounter for gynecological examination    Screening mammogram, encounter for          Subjective:      Patient ID: Gordon Carreon is a 40 y o  female  Patient presents for a routine annual visit  Menarche- 9Y/O  Last Pap Smear-  10/5/21 neg/neg     Syliva Pean Dr Karry Phoenix  Per pt, he places mammo order  [id number]  Non smoker  Social drinker  Currently sexually active  No family history of uterine, ovarian, cervical or breast cancer  Personal hx breast cancer     No concerns/questions for today's visit  Some changes noted - nipple tenderness, pelvic cramping  No bleeding noted  Has been menopausal since 2013 due to chemoradiation/tamoxifen  Gynecologic Exam  She reports no genital itching, genital lesions, genital odor, genital rash, pelvic pain, vaginal bleeding or vaginal discharge  The patient is experiencing no pain  Pertinent negatives include no chills, constipation, diarrhea, dysuria, fever, frequency, headaches, hematuria, nausea, painful intercourse, sore throat, urgency or vomiting  The following portions of the patient's history were reviewed and updated as appropriate:   She  has a past medical history of BRCA1 negative, BRCA2 negative, Breast cancer ([facility] [address] ) (2013), History of chemotherapy (2013), and History of external beam radiation therapy    She   Patient Active Problem List    Diagnosis Date Noted   • Lipoma of torso 09/23/2022   • Encounter for gynecological examination 09/22/2020   • Use of tamoxifen (Nolvadex) 12/23/2019   • Benign neoplasm of skin 05/25/2018   • Seasonal allergies 06/06/2017   • Malignant neoplasm of lower-inner quadrant of left breast in female, estrogen receptor positive ([facility] [address] ) 10/25/2013     She  has a past surgical history that includes  section; Breast biopsy (Right, 10/22/2013); Breast excisional biopsy (Left, 2013); and Breast lumpectomy (Left, )  Her family history includes No Known Problems in her maternal grandmother, mother, paternal aunt, paternal grandmother, and sister; Prostate cancer in her father and paternal grandfather  She  reports that she has never smoked  She has never used smokeless tobacco  She reports current alcohol use  She reports that she does not use drugs  Current Outpatient Medications   Medication Sig Dispense Refill   • loratadine (CLARITIN) 10 mg tablet Take 1 tablet by mouth daily as needed     • naproxen sodium (ALEVE) 220 MG tablet Take 2 tablets by mouth daily as needed     • tamoxifen (NOLVADEX) 20 mg tablet TAKE 1 TABLET BY MOUTH EVERY DAY 90 tablet 3   • Tranexamic Acid 650 MG TABS Take 2 tablets 3 times daily for up to 5 days of the menses 30 tablet 4     No current facility-administered medications for this visit  Current Outpatient Medications on File Prior to Visit   Medication Sig   • loratadine (CLARITIN) 10 mg tablet Take 1 tablet by mouth daily as needed   • naproxen sodium (ALEVE) 220 MG tablet Take 2 tablets by mouth daily as needed   • tamoxifen (NOLVADEX) 20 mg tablet TAKE 1 TABLET BY MOUTH EVERY DAY   • Tranexamic Acid 650 MG TABS Take 2 tablets 3 times daily for up to 5 days of the menses     No current facility-administered medications on file prior to visit  She is allergic to sulfa antibiotics       Review of Systems   Constitutional: Negative for activity change, appetite change, chills, fatigue and fever  HENT: Negative for rhinorrhea, sneezing and sore throat  Eyes: Negative for visual disturbance  Respiratory: Negative for cough, shortness of breath and wheezing      Cardiovascular: Negative for chest pain, palpitations and leg swelling  Gastrointestinal: Negative for abdominal distention, constipation, diarrhea, nausea and vomiting  Genitourinary: Negative for difficulty urinating, dysuria, frequency, hematuria, pelvic pain, urgency and vaginal discharge  Neurological: Negative for syncope, light-headedness and headaches  Objective:      /76 (BP Location: Right arm, Patient Position: Sitting, Cuff Size: Standard)   Ht 5' 3" (1 6 m)   Wt 60 1 kg (132 lb 6 4 oz)   LMP 11/26/2020   BMI 23 45 kg/m²          Physical Exam  Chest:   Breasts: Breasts are symmetrical       Right: No inverted nipple, mass, nipple discharge, skin change or tenderness  Left: No inverted nipple, mass, nipple discharge, skin change or tenderness  Genitourinary:     Labia:         Right: No rash, tenderness, lesion or injury  Left: No rash, tenderness, lesion or injury  Vagina: Normal  No vaginal discharge, tenderness or bleeding  Cervix: No cervical motion tenderness, discharge or friability  Uterus: Not deviated, not enlarged, not fixed and not tender  Adnexa:         Right: No mass, tenderness or fullness  Left: No mass, tenderness or fullness  Neurological:      Mental Status: She is alert and oriented to person, place, and time

## 2022-10-11 NOTE — ASSESSMENT & PLAN NOTE
Pap/HPV current  Mammogram: current  Encourage healthy diet, exercise, Calcium 1200mg per day and at least 800 iu Vitamin D daily      Early onset menopause due to chemoradiation/tamoxifen  -if bleeding occurs will need evaluation

## 2022-10-11 NOTE — PATIENT INSTRUCTIONS
Menopause   WHAT YOU NEED TO KNOW:   What is menopause? Menopause is a normal stage in a woman's life when her monthly periods stop  You are considered to be in menopause when you have not had a period for a full year after the age of 36  Menopause usually occurs between ages 52 to 48  Perimenopause is a stage before menopause that may cause signs and symptoms similar to menopause  Perimenopause may start about 4 years before menopause  What causes menopause? Menopause starts when the ovaries stop making the female hormones estrogen and progesterone  After menopause, you are no longer able to become pregnant  Any of the following may trigger menopause or early menopause:  Surgery, including a hysterectomy or oophorectomy    Family history of early menopause    Smoking    Chemotherapy or pelvic radiation    Chromosome abnormalities, including Brito syndrome and Fragile X syndrome    Premature ovarian insufficiency (the ovaries stop producing eggs before age 36)    What are the signs and symptoms of menopause? You may have any of the following:  Irregular menstrual cycles with heavy vaginal bleeding followed by decreased bleeding until it stops    Hot flashes (feeling warm, flushed, and sweaty)    Vaginal changes such as increased dryness    Mood changes such as anxiety, depression, or decreased desire to have sex    Trouble sleeping, joint pain, headaches    Brittle nails, hair on chin or chest where it is normally absent    Decrease in breast size and change in skin texture    Weight gain    How is menopause treated or managed? Hormone replacement therapy (HRT) is medicine that replaces your low hormone levels  HRT contains estrogen and sometimes progestin  HRT has several benefits  HRT helps prevent osteoporosis, which decreases your risk for bone fractures  HRT also protects you from colorectal cancer  HRT also has some risks    HRT increases your risk for breast cancer, blood clots, heart disease, a heart attack, or a stroke  If you are 72 years or older, HRT can also increase your risk for dementia  Your risk for uterine or endometrial cancer is higher if you take estrogen-only HRT  Manage hot flashes  Hot flashes are brief periods of feeling very warm, flushed, and sweaty  Hot flashes can last from a few seconds to several minutes  They may happen many times during the day, and are common at night  Layer your clothing so that you can easily remove some clothing and cool yourself during a hot flash  Cold drinks may also be helpful  Non-hormone medicines can help relieve or prevent hot flashes  Examples include certain antidepressants, nerve medicines, and high blood pressure medicines  Reduce vaginal dryness by using over-the-counter vaginal creams  Vaginal dryness may cause you to have pain or discomfort during sex  Only use creams that are made for vaginal use  Do  not  use petroleum jelly  You may put an estrogen cream in and around your vagina  Estrogen cream may help decrease vaginal dryness and lower your risk of vaginal infections  Continue to use birth control during perimenopause if you do not want to get pregnant  You may need to use birth control until it has been 1 year since your periods stopped  Ask your healthcare provider when you can stop using birth control to prevent pregnancy  How can I live a healthy lifestyle during and after menopause? After menopause, your risk for heart disease and bone loss increases  Ask about these and other ways to stay healthy:  Exercise regularly  Exercise helps you maintain a healthy weight  Exercise can also help to control your blood pressure and cholesterol levels  Include weight-bearing exercise for strong bones  Weight bearing exercise is recommended for at least 30 minutes, 3 times a week  Ask your healthcare provider about the best exercise plan for you  Eat a variety of healthy foods    Include fruits, vegetables, whole grains (whole-wheat bread, pasta, and cereals), low-fat dairy, and lean protein foods (beans, poultry, and fish)  Limit foods high in sodium (salt)  Ask your healthcare provider for more information about a meal plan that is right for you  Do not smoke  If you smoke, it is never too late to quit  You are more likely to have a heart attack, lung disease, blood clots, and cancer if you smoke  Ask your healthcare provider for information if you need help quitting  Take supplements as directed  You may need extra calcium and vitamin D to help prevent osteoporosis  Limit alcohol and caffeine  Alcohol and caffeine may worsen your symptoms  When should I call my doctor? You have vaginal bleeding after menopause  You have questions or concerns about your condition or care  CARE AGREEMENT:   You have the right to help plan your care  Learn about your health condition and how it may be treated  Discuss treatment options with your healthcare providers to decide what care you want to receive  You always have the right to refuse treatment  The above information is an  only  It is not intended as medical advice for individual conditions or treatments  Talk to your doctor, nurse or pharmacist before following any medical regimen to see if it is safe and effective for you  © Copyright [employer] 2022 Information is for End User's use only and may not be sold, redistributed or otherwise used for commercial purposes   All illustrations and images included in CareNotes® are the copyrighted property of A [employer] A M [employer] Inc  or [address] [place] [address]

## 2022-10-14 RX ORDER — MULTIVITAMIN
1 CAPSULE ORAL DAILY
COMMUNITY

## 2022-10-14 NOTE — PRE-PROCEDURE INSTRUCTIONS
Pre-Surgery Instructions:   Medication Instructions   • loratadine (CLARITIN) 10 mg tablet Uses PRN- OK to take day of surgery   • Multiple Vitamin (multivitamin) capsule Stop taking 7 days prior to surgery  • naproxen sodium (ALEVE) 220 MG tablet Stop taking 5 days prior to surgery  • tamoxifen (NOLVADEX) 20 mg tablet Take night before surgery   • Tranexamic Acid 650 MG TABS Uses PRN- OK to take day of surgery   covid screening negative per patient  Reviewed showering and medication instructions  Instructed to stop NSAIDS and non prescribed vitamins 7 days prior to DOS  Tylenol is ok to take  Take medications morning of surgery with a small sip of water  Patient verbalized understanding  Advised NPO after MN and ASC will call with scheduled surgical time

## 2022-10-18 ENCOUNTER — HOSPITAL ENCOUNTER (OUTPATIENT)
Facility: HOSPITAL | Age: 47
Setting detail: OUTPATIENT SURGERY
Discharge: HOME | End: 2022-10-18
Attending: SURGERY | Admitting: SURGERY
Payer: COMMERCIAL

## 2022-10-18 ENCOUNTER — ANESTHESIA (OUTPATIENT)
Dept: PERIOP | Facility: HOSPITAL | Age: 47
End: 2022-10-18
Payer: COMMERCIAL

## 2022-10-18 ENCOUNTER — ANESTHESIA EVENT (OUTPATIENT)
Dept: PERIOP | Facility: HOSPITAL | Age: 47
End: 2022-10-18
Payer: COMMERCIAL

## 2022-10-18 VITALS
OXYGEN SATURATION: 99 % | HEIGHT: 63 IN | HEART RATE: 66 BPM | WEIGHT: 131.39 LBS | RESPIRATION RATE: 18 BRPM | DIASTOLIC BLOOD PRESSURE: 51 MMHG | BODY MASS INDEX: 23.28 KG/M2 | SYSTOLIC BLOOD PRESSURE: 98 MMHG | TEMPERATURE: 98.6 F

## 2022-10-18 DIAGNOSIS — D17.1 LIPOMA OF TORSO: ICD-10-CM

## 2022-10-18 LAB
EXT PREGNANCY TEST URINE: NEGATIVE
EXT. CONTROL: NORMAL

## 2022-10-18 PROCEDURE — 81025 URINE PREGNANCY TEST: CPT | Performed by: SURGERY

## 2022-10-18 RX ORDER — ACETAMINOPHEN 325 MG/1
975 TABLET ORAL EVERY 8 HOURS PRN
Status: DISCONTINUED | OUTPATIENT
Start: 2022-10-18 | End: 2022-10-18 | Stop reason: HOSPADM

## 2022-10-18 RX ORDER — ONDANSETRON 2 MG/ML
INJECTION INTRAMUSCULAR; INTRAVENOUS AS NEEDED
Status: DISCONTINUED | OUTPATIENT
Start: 2022-10-18 | End: 2022-10-18

## 2022-10-18 RX ORDER — FENTANYL CITRATE/PF 50 MCG/ML
50 SYRINGE (ML) INJECTION
Status: CANCELLED | OUTPATIENT
Start: 2022-10-18

## 2022-10-18 RX ORDER — SODIUM CHLORIDE, SODIUM LACTATE, POTASSIUM CHLORIDE, CALCIUM CHLORIDE 600; 310; 30; 20 MG/100ML; MG/100ML; MG/100ML; MG/100ML
INJECTION, SOLUTION INTRAVENOUS CONTINUOUS PRN
Status: DISCONTINUED | OUTPATIENT
Start: 2022-10-18 | End: 2022-10-18

## 2022-10-18 RX ORDER — CEPHALEXIN 500 MG/1
500 CAPSULE ORAL EVERY 8 HOURS SCHEDULED
Qty: 15 CAPSULE | Refills: 0 | Status: SHIPPED | OUTPATIENT
Start: 2022-10-18 | End: 2022-10-23

## 2022-10-18 RX ORDER — MAGNESIUM HYDROXIDE 1200 MG/15ML
LIQUID ORAL AS NEEDED
Status: DISCONTINUED | OUTPATIENT
Start: 2022-10-18 | End: 2022-10-18 | Stop reason: HOSPADM

## 2022-10-18 RX ORDER — LIDOCAINE HYDROCHLORIDE 10 MG/ML
INJECTION, SOLUTION EPIDURAL; INFILTRATION; INTRACAUDAL; PERINEURAL AS NEEDED
Status: DISCONTINUED | OUTPATIENT
Start: 2022-10-18 | End: 2022-10-18 | Stop reason: HOSPADM

## 2022-10-18 RX ORDER — CEFAZOLIN SODIUM 1 G/50ML
1000 SOLUTION INTRAVENOUS ONCE
Status: COMPLETED | OUTPATIENT
Start: 2022-10-18 | End: 2022-10-18

## 2022-10-18 RX ORDER — SODIUM CHLORIDE, SODIUM LACTATE, POTASSIUM CHLORIDE, CALCIUM CHLORIDE 600; 310; 30; 20 MG/100ML; MG/100ML; MG/100ML; MG/100ML
125 INJECTION, SOLUTION INTRAVENOUS CONTINUOUS
Status: DISCONTINUED | OUTPATIENT
Start: 2022-10-18 | End: 2022-10-18 | Stop reason: HOSPADM

## 2022-10-18 RX ORDER — ROPIVACAINE HYDROCHLORIDE 5 MG/ML
INJECTION, SOLUTION EPIDURAL; INFILTRATION; PERINEURAL
Status: COMPLETED | OUTPATIENT
Start: 2022-10-18 | End: 2022-10-18

## 2022-10-18 RX ORDER — SODIUM CHLORIDE, SODIUM LACTATE, POTASSIUM CHLORIDE, CALCIUM CHLORIDE 600; 310; 30; 20 MG/100ML; MG/100ML; MG/100ML; MG/100ML
125 INJECTION, SOLUTION INTRAVENOUS
Status: DISCONTINUED | OUTPATIENT
Start: 2022-10-18 | End: 2022-10-18 | Stop reason: HOSPADM

## 2022-10-18 RX ORDER — FENTANYL CITRATE 50 UG/ML
INJECTION, SOLUTION INTRAMUSCULAR; INTRAVENOUS AS NEEDED
Status: DISCONTINUED | OUTPATIENT
Start: 2022-10-18 | End: 2022-10-18

## 2022-10-18 RX ORDER — MIDAZOLAM HYDROCHLORIDE 2 MG/2ML
INJECTION, SOLUTION INTRAMUSCULAR; INTRAVENOUS AS NEEDED
Status: DISCONTINUED | OUTPATIENT
Start: 2022-10-18 | End: 2022-10-18

## 2022-10-18 RX ORDER — PROPOFOL 10 MG/ML
INJECTION, EMULSION INTRAVENOUS AS NEEDED
Status: DISCONTINUED | OUTPATIENT
Start: 2022-10-18 | End: 2022-10-18

## 2022-10-18 RX ORDER — PROPOFOL 10 MG/ML
INJECTION, EMULSION INTRAVENOUS CONTINUOUS PRN
Status: DISCONTINUED | OUTPATIENT
Start: 2022-10-18 | End: 2022-10-18

## 2022-10-18 RX ORDER — LIDOCAINE HYDROCHLORIDE 20 MG/ML
INJECTION, SOLUTION EPIDURAL; INFILTRATION; INTRACAUDAL; PERINEURAL AS NEEDED
Status: DISCONTINUED | OUTPATIENT
Start: 2022-10-18 | End: 2022-10-18

## 2022-10-18 RX ADMIN — PROPOFOL 130 MCG/KG/MIN: 10 INJECTION, EMULSION INTRAVENOUS at 09:30

## 2022-10-18 RX ADMIN — SODIUM CHLORIDE, SODIUM LACTATE, POTASSIUM CHLORIDE, AND CALCIUM CHLORIDE 1000 ML: .6; .31; .03; .02 INJECTION, SOLUTION INTRAVENOUS at 09:25

## 2022-10-18 RX ADMIN — MIDAZOLAM 2 MG: 1 INJECTION INTRAMUSCULAR; INTRAVENOUS at 09:26

## 2022-10-18 RX ADMIN — ONDANSETRON 4 MG: 2 INJECTION INTRAMUSCULAR; INTRAVENOUS at 09:31

## 2022-10-18 RX ADMIN — LIDOCAINE HYDROCHLORIDE 80 MG: 20 INJECTION, SOLUTION EPIDURAL; INFILTRATION; INTRACAUDAL at 09:30

## 2022-10-18 RX ADMIN — SODIUM CHLORIDE, SODIUM LACTATE, POTASSIUM CHLORIDE, AND CALCIUM CHLORIDE: .6; .31; .03; .02 INJECTION, SOLUTION INTRAVENOUS at 08:16

## 2022-10-18 RX ADMIN — ROPIVACAINE HYDROCHLORIDE 30 ML: 5 INJECTION, SOLUTION EPIDURAL; INFILTRATION; PERINEURAL at 08:27

## 2022-10-18 RX ADMIN — PROPOFOL 30 MG: 10 INJECTION, EMULSION INTRAVENOUS at 09:30

## 2022-10-18 RX ADMIN — CEFAZOLIN SODIUM 1000 MG: 1 SOLUTION INTRAVENOUS at 09:23

## 2022-10-18 RX ADMIN — FENTANYL CITRATE 50 MCG: 50 INJECTION, SOLUTION INTRAMUSCULAR; INTRAVENOUS at 09:27

## 2022-10-18 NOTE — ANESTHESIA PROCEDURE NOTES
Peripheral Block    Patient location during procedure: pre-op  Start time: 10/18/2022 8:27 AM  Reason for block: procedure for pain, at surgeon's request and post-op pain management  Staffing  Performed: Anesthesiologist   Anesthesiologist: Jaquan Vazquez MD  Preanesthetic Checklist  Completed: patient identified, IV checked, site marked, risks and benefits discussed, surgical consent, monitors and equipment checked, pre-op evaluation and timeout performed  Peripheral Block  Patient position: right lateral decubitus  Prep: ChloraPrep  Patient monitoring: heart rate, cardiac monitor, continuous pulse ox and frequent blood pressure checks  Block type: popliteal  Laterality: right  Injection technique: single-shot  Procedures: ultrasound guided, Ultrasound guidance required for the procedure to increase accuracy and safety of medication placement and decrease risk of complications    Ultrasound permanent image savedropivacaine (NAROPIN) 0 5 % - Perineural   30 mL - 10/18/2022 8:27:00 AM  Needle  Needle type: Stimuplex   Needle gauge: 22 G  Needle length: 5 cm  Needle localization: ultrasound guidance and anatomical landmarks  Needle insertion depth: 4 cm  Test dose: negative  Assessment  Injection assessment: incremental injection, negative aspiration for heme, no paresthesia on injection and local visualized surrounding nerve on ultrasound  Paresthesia pain: none  Heart rate change: no  Slow fractionated injection: yes  Post-procedure:  site cleaned  patient tolerated the procedure well with no immediate complications

## 2022-10-18 NOTE — ANESTHESIA POSTPROCEDURE EVALUATION
Post-Op Assessment Note    CV Status:  Stable  Pain Score: 0    Pain management: adequate     Mental Status:  Alert and awake   Hydration Status:  Euvolemic   PONV Controlled:  Controlled   Airway Patency:  Patent      Post Op Vitals Reviewed: Yes      Staff: Anesthesiologist, CRNA         No complications documented      BP 98/56 (10/18/22 0957)    Temp 98 6 °F (37 °C) (10/18/22 0957)    Pulse 70 (10/18/22 0957)   Resp 12 (10/18/22 0957)    SpO2 98 % (10/18/22 0957)

## 2022-10-18 NOTE — OP NOTE
OPERATIVE REPORT  PATIENT NAME: Tracie Madrid    :  1975  MRN: [id number]  Pt Location: [facility] OR ROOM 03    SURGERY DATE: 10/18/2022    Surgeon(s) and Role:     Sandrine Rodríguez MD - Primary    Preop Diagnosis:  Lipoma of torso [D17 1]    Post-Op Diagnosis Codes:     * Lipoma of torso [D17 1]    Procedure(s) (LRB):  EXCISION  BIOPSY LESION/MASS CHEST WALL (N/A)    Specimen(s):  ID Type Source Tests Collected by Time Destination   1 : chest wall lipoma  Tissue Chest Wall TISSUE EXAM Autumn Katz MD 10/18/2022 4990        Estimated Blood Loss:   Minimal    Drains:  None    Anesthesia Type:   IV Sedation with Anesthesia    Operative Indications:  Lipoma of torso [D17 1]    Operative Findings:  Lipoma measuring 1 x 3 x 4 cm, closest margin 2 mm  Complications:   None    Procedure and Technique:  The patient was identified in the patient was placed in the operating table in a supine position  After adequate IV sedation the anterior chest wall on the left side was prepped and draped in sterile usual fashion with ChloraPrep  Time-out was called the patient was identified as was surgical site  1% lidocaine was infiltrated  A transverse incision was made over the soft tissue mass on the left anterior chest wall with a scalpel, taken down through the subcutaneous tissue with hemostat dissection until the lipoma was identified  Blunt dissection with hemostat was carried out to remove the lipoma from the surrounding fatty tissue and muscle  Once the lipoma was completely excised the specimen was sent to pathology  Hemostasis was accomplished with cautery  Subcutaneous tissue was approximated with 3-0 Vicryl in an interrupted fashion and a skin was closed with 4-0 Vicryl in a continuous subcuticular fashion  Sterile dressing was applied  At the end of the case instrument, needles, sponges counts were correct  Patient tolerated the procedure well     I was present for the entire procedure and A qualified resident physician was not available    Patient Disposition:  APU and hemodynamically stable        SIGNATURE: Laurinda Lundborg, MD  DATE: October 18, 2022  TIME: 9:56 AM

## 2022-10-18 NOTE — DISCHARGE INSTRUCTIONS
SURGERY INSTRUCTIONS    No diet restriction for this surgery  May shower every day starting tomorrow  Remove plastic dressings in 3 days  Remove strips in 7 days  Nothing should be covering incisions 7 days after surgery  Call office to make an appointment in 2 weeks [phone]  Call office with any issues regarding the surgery  No driving, heavy lifting or strenuous exercise for one week  Resume home medications starting today  Resume blood thinners starting tomorrow  (Aspirin, Coumadin, Eliquis, Xarelto)  Apply ice to the incisions  10 minutes every hour for 2 days  May take regular Tylenol or ibuprofen for pain

## 2022-10-18 NOTE — ANESTHESIA PREPROCEDURE EVALUATION
Procedure:  EXCISION  BIOPSY LESION/MASS CHEST WALL (N/A Chest)    Relevant Problems   GYN   (+) Malignant neoplasm of lower-inner quadrant of left breast in female, estrogen receptor positive (HCC)      Musculoskeletal and Integument   (+) Benign neoplasm of skin      H/o breast ca s/p chemo/radiation  Physical Exam    Airway    Mallampati score: II  TM Distance: >3 FB  Neck ROM: full     Dental   Comment: Denies loose teeth,     Cardiovascular  Cardiovascular exam normal    Pulmonary  Pulmonary exam normal     Other Findings  Portions of exam deferred due to low yield and/or unknown COVID status      Anesthesia Plan  ASA Score- 2     Anesthesia Type- IV sedation with anesthesia with ASA Monitors  Additional Monitors:   Airway Plan:           Plan Factors-Exercise tolerance (METS): >4 METS  Chart reviewed  Existing labs reviewed  Patient summary reviewed  Patient is not a current smoker  Induction- intravenous  Postoperative Plan-     Informed Consent- Anesthetic plan and risks discussed with patient  I personally reviewed this patient with the CRNA  Discussed and agreed on the Anesthesia Plan with the CRNA  To Tinajero

## 2022-10-26 ENCOUNTER — CLINICAL SUPPORT (OUTPATIENT)
Dept: FAMILY MEDICINE CLINIC | Facility: CLINIC | Age: 47
End: 2022-10-26
Payer: COMMERCIAL

## 2022-10-26 DIAGNOSIS — Z23 COVID-19 VACCINE ADMINISTERED: Primary | ICD-10-CM

## 2022-10-26 PROCEDURE — 91312 SARSCOV2 VAC BVL 30MCG/0.3ML: CPT

## 2022-10-26 PROCEDURE — 0124A ADM SARSCV2 BVL 30MCG/.3ML B: CPT

## 2022-10-31 ENCOUNTER — OFFICE VISIT (OUTPATIENT)
Dept: SURGERY | Facility: CLINIC | Age: 47
End: 2022-10-31

## 2022-10-31 VITALS
HEART RATE: 66 BPM | WEIGHT: 133 LBS | DIASTOLIC BLOOD PRESSURE: 74 MMHG | HEIGHT: 63 IN | SYSTOLIC BLOOD PRESSURE: 116 MMHG | BODY MASS INDEX: 23.57 KG/M2

## 2022-10-31 DIAGNOSIS — Z48.89 POSTOPERATIVE VISIT: Primary | ICD-10-CM

## 2022-10-31 NOTE — PROGRESS NOTES
Post-Op Follow Up- General Surgery   Lacho Mehta 40 y o  female MRN: [id number]  Unit/Bed#:  Encounter: [id number]    Assessment/Plan     Assessment:  Status post excision of soft tissue lipoma from the chest wall, improved  Plan:  Patient did fairly well and she has no complaints  Pathology not available at this time  History of Present Illness     HPI:  Lacho Mehta is a 40 y o  female who presents to my office accompanied by her  for 1st postop follow-up after excision of soft tissue mass from the left anterior chest wall which clinically was a lipoma  Pathology not available  Patient offers no complaints at this time        Historical Information   Past Medical History:   Diagnosis Date   • BRCA1 negative    • BRCA2 negative    • Breast cancer ([facility] [address] )     left breast   • History of chemotherapy     left breast   • History of external beam radiation therapy     breast     Past Surgical History:   Procedure Laterality Date   • BREAST BIOPSY Right 10/22/2013    benign   • BREAST EXCISIONAL BIOPSY Left 2013    left breast cancer   • BREAST LUMPECTOMY Left    •  SECTION     • CHEST WALL BIOPSY N/A 10/18/2022    Procedure: EXCISION  BIOPSY LESION/MASS CHEST WALL;  Surgeon: Miriam Palm MD;  [facility];  Service: General   • WISDOM TOOTH EXTRACTION Bilateral     "a long time ago"     Social History   Social History     Substance and Sexual Activity   Alcohol Use Yes   • Alcohol/week: 4 0 standard drinks   • Types: 4 Glasses of wine per week    Comment: social     Social History     Substance and Sexual Activity   Drug Use No     Social History     Tobacco Use   Smoking Status Never Smoker   Smokeless Tobacco Never Used     Family History: non-contributory    Meds/Allergies   all medications and allergies reviewed     Current Outpatient Medications:   •  loratadine (CLARITIN) 10 mg tablet, Take 1 tablet by mouth daily as needed, Disp: , Rfl:   •  Multiple Vitamin (multivitamin) capsule, Take 1 capsule by mouth daily, Disp: , Rfl:   •  naproxen sodium (ALEVE) 220 MG tablet, Take 2 tablets by mouth daily as needed, Disp: , Rfl:   •  tamoxifen (NOLVADEX) 20 mg tablet, TAKE 1 TABLET BY MOUTH EVERY DAY, Disp: 90 tablet, Rfl: 3  •  Tranexamic Acid 650 MG TABS, Take 2 tablets 3 times daily for up to 5 days of the menses, Disp: 30 tablet, Rfl: 4  Allergies   Allergen Reactions   • Sulfa Antibiotics Hives       Objective     Current Vitals:   Blood Pressure: 116/74 (10/31/22 0853)  Pulse: 66 (10/31/22 0853)  Height: 5' 3" (160 cm) (10/31/22 0853)  Weight - Scale: 60 3 kg (133 lb) (10/31/22 1659)    Physical Exam  Vitals and nursing note reviewed  Skin:     Comments: Incision from the anterior left chest wall is well-healed without evidence of infection

## 2022-11-08 ENCOUNTER — TELEPHONE (OUTPATIENT)
Dept: OBGYN CLINIC | Facility: CLINIC | Age: 47
End: 2022-11-08

## 2022-11-08 ENCOUNTER — APPOINTMENT (OUTPATIENT)
Dept: LAB | Facility: CLINIC | Age: 47
End: 2022-11-08

## 2022-11-08 DIAGNOSIS — R30.0 DYSURIA: ICD-10-CM

## 2022-11-08 DIAGNOSIS — R30.0 DYSURIA: Primary | ICD-10-CM

## 2022-11-08 LAB
BACTERIA UR QL AUTO: ABNORMAL /HPF
BILIRUB UR QL STRIP: NEGATIVE
CLARITY UR: CLEAR
COLOR UR: COLORLESS
GLUCOSE UR STRIP-MCNC: NEGATIVE MG/DL
HGB UR QL STRIP.AUTO: ABNORMAL
KETONES UR STRIP-MCNC: NEGATIVE MG/DL
LEUKOCYTE ESTERASE UR QL STRIP: ABNORMAL
NITRITE UR QL STRIP: NEGATIVE
NON-SQ EPI CELLS URNS QL MICRO: ABNORMAL /HPF
PH UR STRIP.AUTO: 6.5 [PH]
PROT UR STRIP-MCNC: NEGATIVE MG/DL
RBC #/AREA URNS AUTO: ABNORMAL /HPF
SP GR UR STRIP.AUTO: 1 (ref 1–1.03)
UROBILINOGEN UR STRIP-ACNC: <2 MG/DL
WBC #/AREA URNS AUTO: ABNORMAL /HPF

## 2022-11-08 RX ORDER — SULFAMETHOXAZOLE AND TRIMETHOPRIM 800; 160 MG/1; MG/1
TABLET ORAL
Qty: 14 TABLET | Refills: 0 | Status: CANCELLED | OUTPATIENT
Start: 2022-11-08 | End: 2022-11-15

## 2022-11-08 RX ORDER — CIPROFLOXACIN 500 MG/1
500 TABLET, FILM COATED ORAL EVERY 24 HOURS
Qty: 3 TABLET | Refills: 0 | Status: SHIPPED | OUTPATIENT
Start: 2022-11-08 | End: 2022-11-11

## 2022-11-08 NOTE — TELEPHONE ENCOUNTER
Signed off on orders but changed the antibiotic to Cipro 500 one tablet daily x 3 due to allergy to sulfa

## 2022-11-10 LAB — BACTERIA UR CULT: NORMAL

## 2022-12-10 PROBLEM — Z01.419 ENCOUNTER FOR GYNECOLOGICAL EXAMINATION: Status: RESOLVED | Noted: 2020-09-22 | Resolved: 2022-12-10

## 2022-12-22 ENCOUNTER — OFFICE VISIT (OUTPATIENT)
Dept: SURGICAL ONCOLOGY | Facility: CLINIC | Age: 47
End: 2022-12-22

## 2022-12-22 VITALS
BODY MASS INDEX: 23.21 KG/M2 | HEIGHT: 63 IN | DIASTOLIC BLOOD PRESSURE: 72 MMHG | OXYGEN SATURATION: 98 % | HEART RATE: 70 BPM | RESPIRATION RATE: 18 BRPM | SYSTOLIC BLOOD PRESSURE: 106 MMHG | TEMPERATURE: 98.4 F | WEIGHT: 131 LBS

## 2022-12-22 DIAGNOSIS — C50.312 MALIGNANT NEOPLASM OF LOWER-INNER QUADRANT OF LEFT BREAST IN FEMALE, ESTROGEN RECEPTOR POSITIVE (HCC): Primary | ICD-10-CM

## 2022-12-22 DIAGNOSIS — Z17.0 MALIGNANT NEOPLASM OF LOWER-INNER QUADRANT OF LEFT BREAST IN FEMALE, ESTROGEN RECEPTOR POSITIVE (HCC): Primary | ICD-10-CM

## 2022-12-22 DIAGNOSIS — Z79.810 USE OF TAMOXIFEN (NOLVADEX): ICD-10-CM

## 2022-12-22 DIAGNOSIS — Z12.31 SCREENING MAMMOGRAM, ENCOUNTER FOR: ICD-10-CM

## 2022-12-22 NOTE — PROGRESS NOTES
Surgical Oncology Follow Up       [address] [place] [address],[address]  [facility] [place]  [address] [address]    Mo Ferguson  1975  [id number]  [address] [place] [address],[address] [place] [address]  [facility] [place]  [address] [address]    Chief Complaint   Patient presents with   • Follow-up          Assessment & Plan:   Avelino Fulton presents for 1 year follow-up visit  She has no complaints referable to her breast   Her last mammogram showed no worrisome findings  We will coordinate a mammogram for next June and see her back shortly thereafter  She is agreeable to see our nurse practitioner for her last visit which will be 10 years next year  She remains on her tamoxifen without difficulty all questions were answered the patient's satisfaction  Cancer History:     Oncology History   Malignant neoplasm of lower-inner quadrant of left breast in female, estrogen receptor positive ([facility] [address] )   11/6/2013 Genetic Testing    BRCA negative  [facility]     11/19/2013 Surgery    Left lumpectomy with SLN biopsy  Invasive ductal carcinoma  1 1 cm  Grade 2  0/3 lymph nodes  ER 90  ME 90  Her 2 1+  Stage IA     12/20/2013 Genomic Testing    Mammaprint high risk     1/2014 - 3/2014 Chemotherapy    Adjuvant chemotherapy with Taxotere and cyclophosphamide x4, cycle  Completed in March 2014 4/2014 -  Hormone Therapy    Tamoxifen 20 mg daily    10 years recommended  Dr Uche Monae     4/22/2014 - 6/6/2014 Radiation    Entire left breast to 5040 cGy with an additional  1000 cGy to the lumpectomy cavity  Dr Robyn Joseph           Interval History:   Patient has no complaints referable to her breast   She did have a benign lesion removed from her skin  Review of Systems:   Review of Systems   Constitutional: Negative for chills and fever  HENT: Negative for ear pain and sore throat  Eyes: Negative for pain and visual disturbance     Respiratory: Negative for cough and shortness of breath  Cardiovascular: Negative for chest pain and palpitations  Gastrointestinal: Negative for abdominal pain and vomiting  Genitourinary: Negative for dysuria and hematuria  Musculoskeletal: Negative for arthralgias and back pain  Skin: Negative for color change and rash  Neurological: Negative for seizures and syncope  All other systems reviewed and are negative        Past Medical History     Patient Active Problem List   Diagnosis   • Benign neoplasm of skin   • Malignant neoplasm of lower-inner quadrant of left breast in female, estrogen receptor positive ([facility] [address] )   • Seasonal allergies   • Use of tamoxifen (Nolvadex)   • Lipoma of torso     Past Medical History:   Diagnosis Date   • BRCA1 negative    • BRCA2 negative    • Breast cancer ([facility] [address] )     left breast   • History of chemotherapy     left breast   • History of external beam radiation therapy     breast     Past Surgical History:   Procedure Laterality Date   • BREAST BIOPSY Right 10/22/2013    benign   • BREAST EXCISIONAL BIOPSY Left 2013    left breast cancer   • BREAST LUMPECTOMY Left    •  SECTION     • CHEST WALL BIOPSY N/A 10/18/2022    Procedure: EXCISION  BIOPSY LESION/MASS CHEST WALL;  Surgeon: Renata Bush MD;  Location: [facility] OR;  Service: General   • WISDOM TOOTH EXTRACTION Bilateral     "a long time ago"     Family History   Problem Relation Age of Onset   • No Known Problems Mother    • Prostate cancer Father    • No Known Problems Sister    • No Known Problems Maternal Grandmother    • No Known Problems Paternal Grandmother    • Prostate cancer Paternal Grandfather    • No Known Problems Paternal Aunt    • Breast cancer Neg Hx    • Ovarian cancer Neg Hx    • Colon cancer Neg Hx      Social History     Socioeconomic History   • Marital status: /Civil Union     Spouse name: Not on file   • Number of children: Not on file   • Years of education: Not on file   • Highest education level: Not on file   Occupational History   • Not on file   Tobacco Use   • Smoking status: Never   • Smokeless tobacco: Never   Vaping Use   • Vaping Use: Never used   Substance and Sexual Activity   • Alcohol use: Yes     Alcohol/week: 4 0 standard drinks     Types: 4 Glasses of wine per week     Comment: social   • Drug use: No   • Sexual activity: Yes     Partners: Male     Birth control/protection: Condom   Other Topics Concern   • Not on file   Social History Narrative   • Not on file     Social Determinants of Health     Financial Resource Strain: Not on file   Food Insecurity: Not on file   Transportation Needs: Not on file   Physical Activity: Not on file   Stress: Not on file   Social Connections: Not on file   Intimate Partner Violence: Not on file   Housing Stability: Not on file       Current Outpatient Medications:   •  loratadine (CLARITIN) 10 mg tablet, Take 1 tablet by mouth daily as needed, Disp: , Rfl:   •  Multiple Vitamin (multivitamin) capsule, Take 1 capsule by mouth daily, Disp: , Rfl:   •  naproxen sodium (ALEVE) 220 MG tablet, Take 2 tablets by mouth daily as needed, Disp: , Rfl:   •  tamoxifen (NOLVADEX) 20 mg tablet, TAKE 1 TABLET BY MOUTH EVERY DAY, Disp: 90 tablet, Rfl: 3  •  Tranexamic Acid 650 MG TABS, Take 2 tablets 3 times daily for up to 5 days of the menses, Disp: 30 tablet, Rfl: 4  Allergies   Allergen Reactions   • Sulfa Antibiotics Hives       Physical Exam:     Vitals:    12/22/22 0843   BP: 106/72   Pulse: 70   Resp: 18   Temp: 98 4 °F (36 9 °C)   SpO2: 98%     Physical Exam  Vitals reviewed  Constitutional:       Appearance: She is well-developed  HENT:      Head: Normocephalic and atraumatic  Eyes:      Pupils: Pupils are equal, round, and reactive to light  Neck:      Thyroid: No thyromegaly  Vascular: No JVD  Trachea: No tracheal deviation  Cardiovascular:      Rate and Rhythm: Normal rate and regular rhythm        Heart sounds: Normal heart sounds  No murmur heard  No friction rub  No gallop  Pulmonary:      Effort: Pulmonary effort is normal  No respiratory distress  Breath sounds: Normal breath sounds  No wheezing or rales  Chest:      Comments: Both breasts were examined in the sitting and supine position  There are no worrisome skin lesions, no nipple retraction and no nipple discharge  There are no dominant masses, axillary adenopathy or supraclavicular adenopathy on either side  Abdominal:      General: There is no distension  Palpations: Abdomen is soft  There is no hepatomegaly or mass  Tenderness: There is no abdominal tenderness  There is no guarding or rebound  Musculoskeletal:         General: No tenderness  Normal range of motion  Cervical back: Normal range of motion and neck supple  Lymphadenopathy:      Cervical: No cervical adenopathy  Skin:     General: Skin is warm and dry  Findings: No erythema or rash  Neurological:      Mental Status: She is alert and oriented to person, place, and time  Cranial Nerves: No cranial nerve deficit  Psychiatric:         Behavior: Behavior normal            Results & Discussion:   Patient has a normal clinical breast exam   Her imaging is up-to-date and shows no worrisome findings  She had benign adipose tissue removed from her upper chest   We will see her back in 1 years time  All questions were answered to the patient's satisfaction  She knows to contact us should she appreciate any changes or have any questions regarding her breast           Advance Care Planning/Advance Directives:  I discussed the disease status, treatment plans and follow-up with the patient

## 2023-02-12 DIAGNOSIS — C50.912 MALIGNANT NEOPLASM OF LEFT BREAST IN FEMALE, ESTROGEN RECEPTOR POSITIVE, UNSPECIFIED SITE OF BREAST (HCC): ICD-10-CM

## 2023-02-12 DIAGNOSIS — Z17.0 MALIGNANT NEOPLASM OF LEFT BREAST IN FEMALE, ESTROGEN RECEPTOR POSITIVE, UNSPECIFIED SITE OF BREAST (HCC): ICD-10-CM

## 2023-02-13 RX ORDER — TAMOXIFEN CITRATE 20 MG/1
TABLET ORAL
Qty: 90 TABLET | Refills: 3 | Status: SHIPPED | OUTPATIENT
Start: 2023-02-13

## 2023-02-17 ENCOUNTER — TELEPHONE (OUTPATIENT)
Dept: HEMATOLOGY ONCOLOGY | Facility: CLINIC | Age: 48
End: 2023-02-17

## 2023-02-17 NOTE — TELEPHONE ENCOUNTER
Called and spoke with patient to get her appointment rescheduled from 3/7   Danny Bustamante will be out of the office this day, patient was agreeable to reschedule her appointment for 4/25 at 8:20am

## 2023-04-25 ENCOUNTER — OFFICE VISIT (OUTPATIENT)
Dept: HEMATOLOGY ONCOLOGY | Facility: CLINIC | Age: 48
End: 2023-04-25

## 2023-04-25 VITALS
SYSTOLIC BLOOD PRESSURE: 115 MMHG | HEART RATE: 67 BPM | DIASTOLIC BLOOD PRESSURE: 75 MMHG | WEIGHT: 128 LBS | HEIGHT: 63 IN | OXYGEN SATURATION: 99 % | RESPIRATION RATE: 18 BRPM | BODY MASS INDEX: 22.68 KG/M2

## 2023-04-25 DIAGNOSIS — C50.312 MALIGNANT NEOPLASM OF LOWER-INNER QUADRANT OF LEFT BREAST IN FEMALE, ESTROGEN RECEPTOR POSITIVE (HCC): Primary | ICD-10-CM

## 2023-04-25 DIAGNOSIS — Z17.0 MALIGNANT NEOPLASM OF LOWER-INNER QUADRANT OF LEFT BREAST IN FEMALE, ESTROGEN RECEPTOR POSITIVE (HCC): Primary | ICD-10-CM

## 2023-04-25 NOTE — PROGRESS NOTES
Hematology / Oncology Outpatient Follow Up Note    Freddie Bartlett 56 y o  female :1975 OR         Date:  2023    Assessment / Plan:  A 50 year old premenopausal woman with stage IA left breast cancer, grade 2, ER/MD positive, HER-2 negative disease  Her disease was high risk, based on MammaPrint  She was treated with adjuvant chemotherapy with Taxotere and cyclophosphamide x4 cycles  Currently, she is on tamoxifen without significant side effects  Clinically, she has no evidence of recurrent disease  I recommended her to continue tamoxifen 20 mg daily for 1 more year to complete 10 years of adjuvant hormonal therapy  She is in agreement with my recommendation  I will see her again in a year for routine follow-up         Subjective:      HPI:  A 41year old premenopausal woman, who noticed a lump in the medial lower aspect of her left breast in 2013  She brought this to the medical attention  She was found to have abnormality based on imaging study  She underwent ultrasound-guided biopsy in 2013, which showed invasive mammary carcinoma, ER/MD positive, HER-2 negative disease  She also underwent right breast biopsy, as well as second biopsy of dislocation in the left breast  Both of which were negative for malignancy  She underwent lumpectomy with sentinel lymph node biopsy in 2013  Pathology revealed that 1 1 cm of invasive ductal carcinoma, grade 2  There is no evidence of lymphovascular invasion  3 sentinel lymph node were all negative for metastatic disease  This was ER/MD positive, HER-2 negative disease  Her tumor tissue was sent for MammaPrint, which came back recently as high risk disease  Because of her young age, she underwent BRCA1/2 testing which showed no evidence of mutation  She has no family history of breast cancer or ovarian cancer  She presents today for initial consultation regarding adjuvant treatment   She feels well with no complaint of pain  She has no weight change  Recently  She has no respiratory symptoms  She has regular menstrual cycles  She has no other comorbidities              Interval History:  A 56year-old premenopausal woman, who has no evidence of BRCA mutation  She was diagnosed with stage IA left breast cancer, grade 2, ER/CA positive, HER-2 negative disease in November 2013  Her tumor was found to be high risk, based on the MammaPrint  Therefore, she was treated with adjuvant chemotherapy with Taxotere and cyclophosphamide x4 cycles  Adjuvant chemotherapy was completed in March 2014 with no significant toxicity   she presents today for routine follow-up  She is currently on adjuvant hormonal therapy with tamoxifen with excellent tolerance  She feels well with no new complaints  She has mild hot flashes  There is no complaint of bone pain  She denied any respiratory symptoms  Since her last visit a year ago, she has no menstrual cycles  Her performance status is normal             Objective:      Primary Diagnosis:     1  Left breast cancer  Stage IA(pT1c, pN0,M0), grade 2  ER/CA positive, HER-2 negative disease  MammaPrint, high risk  Diagnosed in November 2013  2  BRCA mutation negative       Cancer Staging:  No matching staging information was found for the patient         Previous Hematologic/ Oncologic Treatment:      Adjuvant chemotherapy with Taxotere and cyclophosphamide x4, cycle  Completed in March 2014       Current Hematologic/ Oncologic Treatment:       Adjuvant hormonal therapy with tamoxifen 20 mg once a day since April 2014       Disease Status:      SHINE status post lumpectomy with sentinel lymph node biopsy       Test Results:     Pathology:      1 1 cm of invasive ductal carcinoma, grade 2  No evidence of lymphovascular invasion  3 sentinel lymph nodes were negative for metastatic disease  This was ER/CA positive, HER-2 negative disease  MammaPrint, high risk   Stage IA (pT1c, pN0,M0)        Radiology:     Mammography in  May 2022  was benign  BI-RADS 2      Laboratory:           Physical Exam:        General Appearance:    Alert, oriented          Eyes:    PERRL   Ears:    Normal external ear canals, both ears   Nose:   Nares normal, septum midline   Throat:   Mucosa moist  Pharynx without injection  Neck:   Supple         Lungs:     Clear to auscultation bilaterally   Chest Wall:    No tenderness or deformity    Heart:    Regular rate and rhythm         Abdomen:     Soft, non-tender, bowel sounds +, no organomegaly               Extremities:   Extremities no cyanosis or edema         Skin:   no rash or icterus  Lymph nodes:   Cervical, supraclavicular, and axillary nodes normal   Neurologic:   CNII-XII intact, normal strength, sensation and reflexes     Throughout             Breast exam:    lumpectomy scar in the inner lower quadrant of the left breast with no palpable abnormalities  Right breast is negative             ROS: Review of Systems   All other systems reviewed and are negative  Imaging: No results found  Labs:   Lab Results   Component Value Date    WBC 5 81 05/12/2014    HGB 12 1 05/12/2014    HCT 38 1 05/12/2014    MCV 94 05/12/2014     05/12/2014     Lab Results   Component Value Date     03/17/2014    K 4 1 09/08/2022     09/08/2022    CO2 26 09/08/2022    ANIONGAP 10 2 03/17/2014    BUN 13 09/08/2022    CREATININE 0 73 09/08/2022    GLUCOSE 76 03/17/2014    GLUF 89 09/08/2022    CALCIUM 9 4 09/08/2022    AST 13 09/07/2019    ALT 12 09/07/2019    ALKPHOS 59 09/07/2019    PROT 7 3 03/17/2014    BILITOT 0 3 03/17/2014    EGFR 99 09/08/2022         Current Medications: Reviewed  Allergies: Reviewed  PMH/FH/SH:  Reviewed      Vital Sign:    Body surface area is 1 6 meters squared      Wt Readings from Last 3 Encounters:   04/25/23 58 1 kg (128 lb)   12/22/22 59 4 kg (131 lb)   10/31/22 60 3 kg (133 lb)        Temp Readings from Last 3 Encounters:   12/22/22 98 4 °F (36 9 °C)   10/18/22 98 6 °F (37 °C) (Temporal)   09/08/22 (!) 97 2 °F (36 2 °C)        BP Readings from Last 3 Encounters:   04/25/23 115/75   12/22/22 106/72   10/31/22 116/74         Pulse Readings from Last 3 Encounters:   04/25/23 67   12/22/22 70   10/31/22 66     @LASTSAO2(3)@

## 2023-06-01 ENCOUNTER — HOSPITAL ENCOUNTER (OUTPATIENT)
Dept: MAMMOGRAPHY | Facility: CLINIC | Age: 48
End: 2023-06-01
Payer: COMMERCIAL

## 2023-06-01 VITALS — HEIGHT: 63 IN | WEIGHT: 128 LBS | BODY MASS INDEX: 22.68 KG/M2

## 2023-06-01 DIAGNOSIS — Z12.31 SCREENING MAMMOGRAM, ENCOUNTER FOR: ICD-10-CM

## 2023-06-01 PROCEDURE — 77067 SCR MAMMO BI INCL CAD: CPT

## 2023-06-01 PROCEDURE — 77063 BREAST TOMOSYNTHESIS BI: CPT

## 2023-09-11 ENCOUNTER — APPOINTMENT (OUTPATIENT)
Dept: LAB | Facility: CLINIC | Age: 48
End: 2023-09-11
Payer: COMMERCIAL

## 2023-09-11 ENCOUNTER — OFFICE VISIT (OUTPATIENT)
Dept: FAMILY MEDICINE CLINIC | Facility: CLINIC | Age: 48
End: 2023-09-11
Payer: COMMERCIAL

## 2023-09-11 VITALS
WEIGHT: 129 LBS | TEMPERATURE: 97.5 F | DIASTOLIC BLOOD PRESSURE: 72 MMHG | SYSTOLIC BLOOD PRESSURE: 124 MMHG | BODY MASS INDEX: 22.86 KG/M2 | HEIGHT: 63 IN | HEART RATE: 78 BPM | OXYGEN SATURATION: 98 %

## 2023-09-11 DIAGNOSIS — Z00.00 ANNUAL PHYSICAL EXAM: ICD-10-CM

## 2023-09-11 DIAGNOSIS — Z00.00 ANNUAL PHYSICAL EXAM: Primary | ICD-10-CM

## 2023-09-11 LAB
ANION GAP SERPL CALCULATED.3IONS-SCNC: 4 MMOL/L
BUN SERPL-MCNC: 16 MG/DL (ref 5–25)
CALCIUM SERPL-MCNC: 8.5 MG/DL (ref 8.4–10.2)
CHLORIDE SERPL-SCNC: 107 MMOL/L (ref 96–108)
CHOLEST SERPL-MCNC: 150 MG/DL
CO2 SERPL-SCNC: 28 MMOL/L (ref 21–32)
CREAT SERPL-MCNC: 0.65 MG/DL (ref 0.6–1.3)
GFR SERPL CREATININE-BSD FRML MDRD: 106 ML/MIN/1.73SQ M
GLUCOSE P FAST SERPL-MCNC: 84 MG/DL (ref 65–99)
HDLC SERPL-MCNC: 67 MG/DL
LDLC SERPL CALC-MCNC: 70 MG/DL (ref 0–100)
NONHDLC SERPL-MCNC: 83 MG/DL
POTASSIUM SERPL-SCNC: 4.4 MMOL/L (ref 3.5–5.3)
SODIUM SERPL-SCNC: 139 MMOL/L (ref 135–147)
TRIGL SERPL-MCNC: 65 MG/DL

## 2023-09-11 PROCEDURE — 99396 PREV VISIT EST AGE 40-64: CPT | Performed by: FAMILY MEDICINE

## 2023-09-11 PROCEDURE — 80048 BASIC METABOLIC PNL TOTAL CA: CPT

## 2023-09-11 PROCEDURE — 36415 COLL VENOUS BLD VENIPUNCTURE: CPT

## 2023-09-11 PROCEDURE — 80061 LIPID PANEL: CPT

## 2023-09-11 NOTE — PROGRESS NOTES
[address]    NAME: Ai Guevara  AGE: 56 y.o. SEX: female  : 1975     DATE: 2023     Assessment and Plan:     Problem List Items Addressed This Visit    None  Visit Diagnoses     Annual physical exam    -  Primary    Relevant Orders    Lipid panel    Basic metabolic panel        Colon cancer screening declined. Discussed options. Would like to take some time to decide. Immunizations and preventive care screenings were discussed with patient today. Appropriate education was printed on patient's after visit summary. Counseling:  Alcohol/drug use: discussed moderation in alcohol intake, the recommendations for healthy alcohol use, and avoidance of illicit drug use. Dental Health: discussed importance of regular tooth brushing, flossing, and dental visits. Sexual health: discussed sexually transmitted diseases, partner selection, use of condoms, avoidance of unintended pregnancy, and contraceptive alternatives. Exercise: the importance of regular exercise/physical activity was discussed. Recommend exercise 3-5 times per week for at least 30 minutes. Return in about 1 year (around 2024) for Annual physical.     Chief Complaint:     Chief Complaint   Patient presents with   • Physical Exam     Annual PE   • HM     Pt declines CRC screening at this time. History of Present Illness:     Adult Annual Physical   Patient here for a comprehensive physical exam. The patient reports no problems. In her final year of the Tamoxifen. Diet and Physical Activity  Diet/Nutrition: well balanced diet. Exercise: walking, 5-7 times a week on average and less than 30 minutes on average.       Depression Screening  PHQ-2/9 Depression Screening    Little interest or pleasure in doing things: 0 - not at all  Feeling down, depressed, or hopeless: 0 - not at all  PHQ-2 Score: 0  PHQ-2 Interpretation: Negative depression screen       General Health  Sleep: sleeps well. Hearing: normal - bilateral.  Vision: goes for regular eye exams, most recent eye exam >1 year ago and wears glasses. Dental: regular dental visits, brushes teeth twice daily and flosses teeth occasionally. /GYN Health  Patient is: postmenopausal  Last menstrual period: 3 years  Contraceptive method: barrier methofs     Review of Systems:     Review of Systems   Past Medical History:     Past Medical History:   Diagnosis Date   • BRCA1 negative    • BRCA2 negative    • Breast cancer ([address])     left breast   • History of chemotherapy 2013    left breast   • History of external beam radiation therapy     breast      Past Surgical History:     Past Surgical History:   Procedure Laterality Date   • BREAST BIOPSY Right 10/22/2013    benign   • BREAST EXCISIONAL BIOPSY Left 2013    left breast cancer   • BREAST LUMPECTOMY Left    •  SECTION     • CHEST WALL BIOPSY N/A 10/18/2022    Procedure: EXCISION  BIOPSY LESION/MASS CHEST WALL;  Surgeon: Jeramie Alvarado MD;  Location: [facility] OR;  Service: General   • WISDOM TOOTH EXTRACTION Bilateral     "a long time ago"      Social History:     Social History     Socioeconomic History   • Marital status: /Civil Union     Spouse name: None   • Number of children: None   • Years of education: None   • Highest education level: None   Occupational History   • None   Tobacco Use   • Smoking status: Never   • Smokeless tobacco: Never   Vaping Use   • Vaping Use: Never used   Substance and Sexual Activity   • Alcohol use:  Yes     Alcohol/week: 4.0 standard drinks of alcohol     Types: 4 Glasses of wine per week     Comment: social   • Drug use: No   • Sexual activity: Yes     Partners: Male     Birth control/protection: Condom   Other Topics Concern   • None   Social History Narrative   • None     Social Determinants of Health     Financial Resource Strain: Not on file   Food Insecurity: Not on file   Transportation Needs: Not on file   Physical Activity: Not on file   Stress: Not on file   Social Connections: Not on file   Intimate Partner Violence: Not on file   Housing Stability: Not on file      Family History:     Family History   Problem Relation Age of Onset   • No Known Problems Mother    • Prostate cancer Father    • No Known Problems Sister    • No Known Problems Maternal Grandmother    • No Known Problems Paternal Grandmother    • Prostate cancer Paternal Grandfather    • No Known Problems Paternal Aunt    • Breast cancer Neg Hx    • Ovarian cancer Neg Hx    • Colon cancer Neg Hx       Current Medications:     Current Outpatient Medications   Medication Sig Dispense Refill   • loratadine (CLARITIN) 10 mg tablet Take 1 tablet by mouth daily as needed     • Multiple Vitamin (multivitamin) capsule Take 1 capsule by mouth daily     • naproxen sodium (ALEVE) 220 MG tablet Take 2 tablets by mouth daily as needed     • tamoxifen (NOLVADEX) 20 mg tablet TAKE 1 TABLET BY MOUTH EVERY DAY 90 tablet 3   • Tranexamic Acid 650 MG TABS Take 2 tablets 3 times daily for up to 5 days of the menses (Patient not taking: Reported on 9/11/2023) 30 tablet 4     No current facility-administered medications for this visit. Allergies: Allergies   Allergen Reactions   • Sulfa Antibiotics Hives      Physical Exam:     /72 (BP Location: Left arm, Patient Position: Sitting, Cuff Size: Adult)   Pulse 78   Temp 97.5 °F (36.4 °C) (Tympanic)   Ht 5' 3" (1.6 m)   Wt 58.5 kg (129 lb)   LMP 11/26/2020   SpO2 98%   BMI 22.85 kg/m²     Physical Exam  Vitals reviewed. Constitutional:       General: She is not in acute distress. Appearance: Normal appearance. HENT:      Head: Normocephalic and atraumatic.       Right Ear: External ear normal.      Left Ear: External ear normal.      Nose: Nose normal.      Mouth/Throat:      Mouth: Mucous membranes are moist. Eyes:      Extraocular Movements: Extraocular movements intact. Conjunctiva/sclera: Conjunctivae normal.      Pupils: Pupils are equal, round, and reactive to light. Cardiovascular:      Rate and Rhythm: Normal rate and regular rhythm. Heart sounds: Normal heart sounds. Pulmonary:      Effort: Pulmonary effort is normal.      Breath sounds: Normal breath sounds. Abdominal:      General: Bowel sounds are normal. There is no distension. Palpations: Abdomen is soft. Tenderness: There is no abdominal tenderness. Musculoskeletal:      Cervical back: Neck supple. Right lower leg: No edema. Left lower leg: No edema. Lymphadenopathy:      Cervical: No cervical adenopathy. Skin:     General: Skin is warm. Capillary Refill: Capillary refill takes less than 2 seconds. Findings: No rash. Neurological:      Mental Status: She is alert. Mental status is at baseline.         [facility], [facility] [address] [place] [address]

## 2023-09-12 ENCOUNTER — TELEPHONE (OUTPATIENT)
Dept: FAMILY MEDICINE CLINIC | Facility: CLINIC | Age: 48
End: 2023-09-12

## 2023-09-12 NOTE — TELEPHONE ENCOUNTER
Pt returned test results message  - I gave her 's note / test results in her chart - pt voiced verbal understanding and has no questions at this time. Pt stating she saw her test results on [employer]. Pt spouse picked up completed forms. Claire Lisa has an appt for PE on  09/12/2024. [facility] notified. "Form completed.  Labs Wnl."

## 2023-10-17 ENCOUNTER — TELEPHONE (OUTPATIENT)
Dept: HEMATOLOGY ONCOLOGY | Facility: CLINIC | Age: 48
End: 2023-10-17

## 2023-10-17 NOTE — TELEPHONE ENCOUNTER
JAMIL  DR mary AGUILAR   Who are you speaking with? Patient   If it is not the patient, are they listed on an active communication consent form? Yes   Is this a JAMIL or DR mary AGUILAR JAMIL   Which provider is patient currently scheduled or established with? Dr. Cruz Cheung   What is the original appointment date and time? 4/30/24   At which location is the appointment scheduled to take place? [facility]   Which provider is the patient transitioning care to? Dr. Naty Oconnor   What is the new appointment date and time? 4/30/23   At which location is the new appointment scheduled to take place? [facility]   What is the reason for this change?  Provider

## 2023-10-17 NOTE — TELEPHONE ENCOUNTER
Left VM for patient to contact the office to reschedule her appointment with Dr. Mark Anthony Cortes to another provider.

## 2023-11-07 NOTE — PROGRESS NOTES
Assessment/Plan:    Encounter for gynecological examination  Pap/HPV current  Mammogram: Follows with Dr. Miladys Sandoval    Encourage healthy diet, exercise, Calcium 1200mg per day and at least 800 iu Vitamin D daily. Diagnoses and all orders for this visit:    Encounter for annual routine gynecological examination    Encounter for gynecological examination          Subjective:      Patient ID: Caitie Manrique is a 56 y.o. female. Patient presents for a routine annual visit  Menarche-9 Y/O  Last Pap Smear- 10/5/21 neg/neg    Mammogram-2023  Colonoscopy- referral deferred     Non smoker  Social drinker  Currently sexually active  No family history of uterine, ovarian, cervical or breast cancer    Boys are doing well. Gynecologic Exam  She reports no genital itching, genital lesions, genital odor, genital rash, pelvic pain, vaginal bleeding or vaginal discharge. The patient is experiencing no pain. Pertinent negatives include no chills, constipation, diarrhea, fever, nausea, sore throat or vomiting. The following portions of the patient's history were reviewed and updated as appropriate: She  has a past medical history of BRCA1 negative, BRCA2 negative, Breast cancer ([address]) (), Cancer ([address]) (), History of chemotherapy (), History of external beam radiation therapy, and Kidney stone (). She   Patient Active Problem List    Diagnosis Date Noted    Lipoma of torso 2022    Encounter for gynecological examination 2020    Use of tamoxifen (Nolvadex) 2019    Benign neoplasm of skin 2018    Seasonal allergies 2017    Malignant neoplasm of lower-inner quadrant of left breast in female, estrogen receptor positive  10/25/2013     She  has a past surgical history that includes  section; Breast biopsy (Right, 10/22/2013); Breast excisional biopsy (Left, 2013); Breast lumpectomy (Left, );  A[place]a tooth extraction (Bilateral); and Chest wall biopsy (N/A, 10/18/2022). Her family history includes No Known Problems in her maternal grandmother, mother, paternal aunt, paternal grandmother, and sister; Prostate cancer in her father and paternal grandfather. She  reports that she has never smoked. She has never used smokeless tobacco. She reports current alcohol use of about 4.0 - 5.0 standard drinks of alcohol per week. She reports that she does not use drugs. Current Outpatient Medications   Medication Sig Dispense Refill    loratadine (CLARITIN) 10 mg tablet Take 1 tablet by mouth daily as needed      Multiple Vitamin (multivitamin) capsule Take 1 capsule by mouth daily      naproxen sodium (ALEVE) 220 MG tablet Take 2 tablets by mouth daily as needed      tamoxifen (NOLVADEX) 20 mg tablet TAKE 1 TABLET BY MOUTH EVERY DAY 90 tablet 3     No current facility-administered medications for this visit. Current Outpatient Medications on File Prior to Visit   Medication Sig    loratadine (CLARITIN) 10 mg tablet Take 1 tablet by mouth daily as needed    Multiple Vitamin (multivitamin) capsule Take 1 capsule by mouth daily    naproxen sodium (ALEVE) 220 MG tablet Take 2 tablets by mouth daily as needed    tamoxifen (NOLVADEX) 20 mg tablet TAKE 1 TABLET BY MOUTH EVERY DAY    [DISCONTINUED] Tranexamic Acid 650 MG TABS Take 2 tablets 3 times daily for up to 5 days of the menses (Patient not taking: Reported on 9/11/2023)     No current facility-administered medications on file prior to visit. She is allergic to sulfa antibiotics. .    Review of Systems   Constitutional:  Negative for activity change, appetite change, chills, fatigue and fever. HENT:  Negative for rhinorrhea, sneezing and sore throat. Eyes:  Negative for visual disturbance. Respiratory:  Negative for cough, shortness of breath and wheezing. Cardiovascular:  Negative for chest pain, palpitations and leg swelling.    Gastrointestinal:  Negative for abdominal distention, constipation, diarrhea, nausea and vomiting. Genitourinary:  Negative for difficulty urinating, pelvic pain and vaginal discharge. Neurological:  Negative for syncope and light-headedness. Objective:      /74 (BP Location: Right arm, Patient Position: Sitting, Cuff Size: Standard)   Ht 5' 2.25" (1.581 m)   Wt 57.7 kg (127 lb 3.2 oz)   LMP 11/26/2020   BMI 23.08 kg/m²          Physical Exam  Chest:   Breasts:     Breasts are symmetrical.      Right: No inverted nipple, mass, nipple discharge, skin change or tenderness. Left: No inverted nipple, mass, nipple discharge, skin change or tenderness. Genitourinary:     Labia:         Right: No rash, tenderness, lesion or injury. Left: No rash, tenderness, lesion or injury. Vagina: Normal. No vaginal discharge, erythema, tenderness or bleeding. Cervix: No cervical motion tenderness, discharge, friability, erythema or cervical bleeding. Uterus: Not deviated, not enlarged, not fixed and not tender. Adnexa:         Right: No mass, tenderness or fullness. Left: No mass, tenderness or fullness. Neurological:      Mental Status: She is alert and oriented to person, place, and time.

## 2023-11-08 ENCOUNTER — ANNUAL EXAM (OUTPATIENT)
Dept: OBGYN CLINIC | Facility: CLINIC | Age: 48
End: 2023-11-08
Payer: COMMERCIAL

## 2023-11-08 VITALS
SYSTOLIC BLOOD PRESSURE: 118 MMHG | DIASTOLIC BLOOD PRESSURE: 74 MMHG | WEIGHT: 127.2 LBS | HEIGHT: 62 IN | BODY MASS INDEX: 23.41 KG/M2

## 2023-11-08 DIAGNOSIS — Z01.419 ENCOUNTER FOR ANNUAL ROUTINE GYNECOLOGICAL EXAMINATION: Primary | ICD-10-CM

## 2023-11-08 DIAGNOSIS — Z01.419 ENCOUNTER FOR GYNECOLOGICAL EXAMINATION: ICD-10-CM

## 2023-11-08 PROCEDURE — 99396 PREV VISIT EST AGE 40-64: CPT | Performed by: OBSTETRICS & GYNECOLOGY

## 2023-11-08 NOTE — ASSESSMENT & PLAN NOTE
Pap/HPV current  Mammogram: Follows with Dr. Deedee Swann    Encourage healthy diet, exercise, Calcium 1200mg per day and at least 800 iu Vitamin D daily.

## 2023-12-28 ENCOUNTER — TELEPHONE (OUTPATIENT)
Dept: HEMATOLOGY ONCOLOGY | Facility: CLINIC | Age: 48
End: 2023-12-28

## 2023-12-28 NOTE — TELEPHONE ENCOUNTER
Appointment Change  Cancel, Reschedule, Change to Virtual      Who are you speaking with? Patient   If it is not the patient, is the caller listed on the communication consent form? N/A   Which provider is the appointment scheduled with? GURVINDER Gay   When was the original appointment scheduled?    Please list date and time 1/3/24 9am   At which location is the appointment scheduled to take place? [facility]   Was the appointment rescheduled?     Was the appointment changed from an in person visit to a virtual visit?    If so, please list the details of the change. 2/13/24 150   What is the reason for the appointment change? Appt conflict       Was STAR transport scheduled? N/A   Does STAR transport need to be scheduled for the new visit (if applicable) N/A   Does the patient need an infusion appointment rescheduled? N/A   Does the patient have an upcoming infusion appointment scheduled? If so, when? No   Is the patient undergoing chemotherapy? N/A   For appointments cancelled with less than 24 hours:  Was the no-show policy reviewed? N/A

## 2024-01-07 PROBLEM — Z01.419 ENCOUNTER FOR GYNECOLOGICAL EXAMINATION: Status: RESOLVED | Noted: 2020-09-22 | Resolved: 2024-01-07

## 2024-02-07 ENCOUNTER — TELEPHONE (OUTPATIENT)
Dept: HEMATOLOGY ONCOLOGY | Facility: CLINIC | Age: 49
End: 2024-02-07

## 2024-02-07 NOTE — TELEPHONE ENCOUNTER
Appointment Change  Cancel, Reschedule, Change to Virtual      Who are you speaking with? Patient   If it is not the patient, is the caller listed on the communication consent form? N/A   Which provider is the appointment scheduled with? GURVINDER Gay   When was the original appointment scheduled?    Please list date and time 2/13/24 @ 8:30am   At which location is the appointment scheduled to take place? [facility]   Was the appointment rescheduled?     Was the appointment changed from an in person visit to a virtual visit?    If so, please list the details of the change. Yes 2/21/24 @ 8:30am   What is the reason for the appointment change? Patient has a conflict with work schedule       Was STAR transport scheduled? no   Does STAR transport need to be scheduled for the new visit (if applicable) no   Does the patient need an infusion appointment rescheduled? no   Does the patient have an upcoming infusion appointment scheduled? If so, when? no   Is the patient undergoing chemotherapy? no   For appointments cancelled with less than 24 hours:  Was the no-show policy reviewed? yes

## 2024-02-16 DIAGNOSIS — C50.912 MALIGNANT NEOPLASM OF LEFT BREAST IN FEMALE, ESTROGEN RECEPTOR POSITIVE, UNSPECIFIED SITE OF BREAST: ICD-10-CM

## 2024-02-16 DIAGNOSIS — Z17.0 MALIGNANT NEOPLASM OF LEFT BREAST IN FEMALE, ESTROGEN RECEPTOR POSITIVE, UNSPECIFIED SITE OF BREAST: ICD-10-CM

## 2024-02-21 ENCOUNTER — OFFICE VISIT (OUTPATIENT)
Dept: SURGICAL ONCOLOGY | Facility: CLINIC | Age: 49
End: 2024-02-21
Payer: COMMERCIAL

## 2024-02-21 VITALS
OXYGEN SATURATION: 98 % | SYSTOLIC BLOOD PRESSURE: 120 MMHG | RESPIRATION RATE: 18 BRPM | DIASTOLIC BLOOD PRESSURE: 78 MMHG | TEMPERATURE: 97.5 F | BODY MASS INDEX: 22.59 KG/M2 | HEIGHT: 63 IN | WEIGHT: 127.5 LBS | HEART RATE: 67 BPM

## 2024-02-21 DIAGNOSIS — Z17.0 MALIGNANT NEOPLASM OF LOWER-INNER QUADRANT OF LEFT BREAST IN FEMALE, ESTROGEN RECEPTOR POSITIVE: Primary | ICD-10-CM

## 2024-02-21 DIAGNOSIS — Z12.39 ENCOUNTER FOR BREAST CANCER SCREENING OTHER THAN MAMMOGRAM: ICD-10-CM

## 2024-02-21 DIAGNOSIS — C50.312 MALIGNANT NEOPLASM OF LOWER-INNER QUADRANT OF LEFT BREAST IN FEMALE, ESTROGEN RECEPTOR POSITIVE: Primary | ICD-10-CM

## 2024-02-21 DIAGNOSIS — R92.30 DENSE BREAST: ICD-10-CM

## 2024-02-21 DIAGNOSIS — R92.2 DENSE BREAST: ICD-10-CM

## 2024-02-21 DIAGNOSIS — Z79.810 USE OF TAMOXIFEN (NOLVADEX): ICD-10-CM

## 2024-02-21 DIAGNOSIS — Z12.31 VISIT FOR SCREENING MAMMOGRAM: ICD-10-CM

## 2024-02-21 PROCEDURE — 99214 OFFICE O/P EST MOD 30 MIN: CPT

## 2024-02-21 RX ORDER — TAMOXIFEN CITRATE 20 MG/1
20 TABLET ORAL DAILY
Qty: 90 TABLET | Refills: 0 | Status: SHIPPED | OUTPATIENT
Start: 2024-02-21

## 2024-02-21 RX ORDER — TAMOXIFEN CITRATE 20 MG/1
TABLET ORAL
Qty: 90 TABLET | Refills: 3 | Status: SHIPPED | OUTPATIENT
Start: 2024-02-21

## 2024-02-21 NOTE — PROGRESS NOTES
Surgical Oncology Follow Up       [facility] [place]  [address]  [place] [address]    Saray Guo  1975  [id number]  [facility] [place]  [address]  [place] [address]    Chief Complaint   Patient presents with   • office  visit       Assessment/Plan:  1. Malignant neoplasm of lower-inner quadrant of left breast in female, estrogen receptor positive   - 1 year f/u    2. Use of tamoxifen (Nolvadex)  - continue use per medical oncology  - tamoxifen (NOLVADEX) 20 mg tablet; Take 1 tablet (20 mg total) by mouth daily  Dispense: 90 tablet; Refill: 0    3. Visit for screening mammogram  - Mammo screening bilateral w 3d & cad; Future    4. Encounter for breast cancer screening other than mammogram  - US breast screening bilateral complete (ABUS); Future    5. Dense breast  - US breast screening bilateral complete (ABUS); Future      Discussion/Summary: Patient is a 48-year-old female presenting today for a 1 year follow-up for history of left breast cancer diagnosed in November 2013.  Pathology revealed invasive ductal carcinoma ER/IA positive, HER2 negative.  She had a left breast lumpectomy with sentinel node biopsy. There was no lymphovascular involvement. She had adjuvant chemotherapy as well as whole breast radiation therapy. She is about to complete 10 years of tamoxifen therapy. She states she only has 2 tablets left so I re-ordered her tamoxifen to get it to her appt with the medical oncologist. She is slightly nervous to go off the tamoxifen. She had a b/l screening mammogram on 6/1/23 which was BIRADS 2 category 3 density. We spoke about scheduling an ABUS with the mammogram secondary to her dense breast tissue and she was agreeable. There were no concerns on her breast exam. I will see the patient back in 1 year or sooner should the need arise. She was instructed to  call with any questions or concerns prior to this time. All questions were answered today.      History of Present Illness:     Oncology History   Malignant neoplasm of lower-inner quadrant of left breast in female, estrogen receptor positive    11/6/2013 Genetic Testing    BRCA negative  [facility]     11/19/2013 Surgery    Left lumpectomy with SLN biopsy  Invasive ductal carcinoma  1.1 cm  Grade 2  0/3 lymph nodes  ER 90  MI 90  Her 2 1+  Stage IA     11/19/2013 -  Cancer Staged    Staging form: Breast, AJCC 7th Edition  - Pathologic stage from 11/19/2013: Stage IA (T1c, N0, cM0) - Signed by GURVINDER Dennis on 2/21/2024  Specimen type: Excision  Laterality: Left  Tumor size (mm): 11  Method of lymph node assessment: [place] lymph node biopsy  Histologic grade (G): G2  Estrogen receptor status: Positive  Percentage of positive estrogen receptors (%): 90  Progesterone receptor status: Positive  Percentage of positive progesterone receptors (%): 90  HER2 status: Negative       12/20/2013 Genomic Testing    Mammaprint high risk     1/2014 - 3/2014 Chemotherapy    Adjuvant chemotherapy with Taxotere and cyclophosphamide x4, cycle. Completed in March 2014.      4/2014 -  Hormone Therapy    Tamoxifen 20 mg daily    10 years recommended  Dr. Weeks     4/22/2014 - 6/6/2014 Radiation    Entire left breast to 5040 cGy with an additional  1000 cGy to the lumpectomy cavity  Dr. Arriaza          -Interval History: Patient is a 48-year-old female presenting today for a 1 year follow-up for history of left breast cancer diagnosed in November 2013.  She is about to complete 10 years of tamoxifen therapy. She had a b/l screening mammogram on 6/1/23 which was BIRADS 2 category 3 density.  Patient denies changes on her breast exam. She denies persistent headaches, bone pain, back pain, shortness of breath, or abdominal pain.      Review of Systems:  Review of Systems   Constitutional:  Negative for activity change,  "appetite change, fatigue and unexpected weight change.   Respiratory:  Negative for cough and shortness of breath.    Cardiovascular:  Negative for chest pain.   Gastrointestinal:  Negative for abdominal pain, diarrhea, nausea and vomiting.   Endocrine: Negative for heat intolerance.   Musculoskeletal:  Negative for arthralgias, back pain and myalgias.   Skin:  Negative for rash.   Neurological:  Negative for weakness and headaches.   Hematological:  Negative for adenopathy.       Patient Active Problem List   Diagnosis   • Benign neoplasm of skin   • Malignant neoplasm of lower-inner quadrant of left breast in female, estrogen receptor positive    • Seasonal allergies   • Use of tamoxifen (Nolvadex)   • Lipoma of torso     Past Medical History:   Diagnosis Date   • BRCA1 negative    • BRCA2 negative    • Breast cancer (HCC)     left breast   • Cancer (HCC)    • History of chemotherapy     left breast   • History of external beam radiation therapy     breast   • Kidney stone      Past Surgical History:   Procedure Laterality Date   • BREAST BIOPSY Right 10/22/2013    benign   • BREAST EXCISIONAL BIOPSY Left 2013    left breast cancer   • BREAST LUMPECTOMY Left    •  SECTION     • CHEST WALL BIOPSY N/A 10/18/2022    Procedure: EXCISION  BIOPSY LESION/MASS CHEST WALL;  Surgeon: Thomas Edwards MD;  Location: [facility] OR;  Service: General   • WISDOM TOOTH EXTRACTION Bilateral     \"a long time ago\"     Family History   Problem Relation Age of Onset   • No Known Problems Mother    • Prostate cancer Father    • No Known Problems Sister    • No Known Problems Maternal Grandmother    • No Known Problems Paternal Grandmother    • Prostate cancer Paternal Grandfather    • No Known Problems Paternal Aunt    • Breast cancer Neg Hx    • Ovarian cancer Neg Hx    • Colon cancer Neg Hx      Social History     Socioeconomic History   • Marital status: /Civil Union     Spouse name: Not on file "   • Number of children: Not on file   • Years of education: Not on file   • Highest education level: Not on file   Occupational History   • Not on file   Tobacco Use   • Smoking status: Never   • Smokeless tobacco: Never   Vaping Use   • Vaping status: Never Used   Substance and Sexual Activity   • Alcohol use: Yes     Alcohol/week: 4.0 - 5.0 standard drinks of alcohol     Types: 4 - 5 Glasses of wine per week     Comment: social   • Drug use: No   • Sexual activity: Yes     Partners: Male     Birth control/protection: Condom Male   Other Topics Concern   • Not on file   Social History Narrative   • Not on file     Social Determinants of Health     Financial Resource Strain: Not on file   Food Insecurity: Not on file   Transportation Needs: Not on file   Physical Activity: Not on file   Stress: Not on file (2/11/2021)   Social Connections: Not on file   Intimate Partner Violence: Low Risk  (4/13/2021)    Received from [facility]    Intimate Partner Violence    • Insults You: Not on file    • Threatens You: Not on file    • Screams at You: Not on file    • Physically Hurt: Not on file    • Intimate Partner Violence Score: Not on file   Housing Stability: Not on file       Current Outpatient Medications:   •  loratadine (CLARITIN) 10 mg tablet, Take 1 tablet by mouth daily as needed, Disp: , Rfl:   •  Multiple Vitamin (multivitamin) capsule, Take 1 capsule by mouth daily, Disp: , Rfl:   •  naproxen sodium (ALEVE) 220 MG tablet, Take 2 tablets by mouth daily as needed, Disp: , Rfl:   •  tamoxifen (NOLVADEX) 20 mg tablet, Take 1 tablet (20 mg total) by mouth daily, Disp: 90 tablet, Rfl: 0  •  tamoxifen (NOLVADEX) 20 mg tablet, TAKE 1 TABLET BY MOUTH EVERY DAY, Disp: 90 tablet, Rfl: 3  Allergies   Allergen Reactions   • Sulfa Antibiotics Hives     Vitals:    02/21/24 0833   BP: 120/78   Pulse: 67   Resp: 18   Temp: 97.5 °F (36.4 °C)   SpO2: 98%       Physical Exam  Constitutional:       General: She is not in acute  distress.     Appearance: Normal appearance.   Cardiovascular:      Rate and Rhythm: Normal rate and regular rhythm.      Pulses: Normal pulses.      Heart sounds: Normal heart sounds.   Pulmonary:      Effort: Pulmonary effort is normal.      Breath sounds: Normal breath sounds.   Chest:      Chest wall: No mass.   Breasts:     Right: No swelling, bleeding, inverted nipple, mass, nipple discharge, skin change or tenderness.      Left: No swelling, bleeding, inverted nipple, mass, nipple discharge, skin change or tenderness.       Abdominal:      General: Abdomen is flat.      Palpations: Abdomen is soft.   Lymphadenopathy:      Upper Body:      Right upper body: No supraclavicular, axillary or pectoral adenopathy.      Left upper body: No supraclavicular, axillary or pectoral adenopathy.   Skin:     General: Skin is warm.   Neurological:      General: No focal deficit present.      Mental Status: She is alert and oriented to person, place, and time.   Psychiatric:         Mood and Affect: Mood normal.         Behavior: Behavior normal.           Results:    Imaging  No results found.    I reviewed the above imaging data.      Advance Care Planning/Advance Directives:  Discussed disease status, cancer treatment plans and/or cancer treatment goals with the patient.

## 2024-04-29 ENCOUNTER — TELEPHONE (OUTPATIENT)
Dept: HEMATOLOGY ONCOLOGY | Facility: CLINIC | Age: 49
End: 2024-04-29

## 2024-04-29 NOTE — TELEPHONE ENCOUNTER
Appointment Change  Cancel, Reschedule, Change to Virtual      Who are you speaking with? Patient   If it is not the patient, is the caller listed on the communication consent form? N/A   Which provider is the appointment scheduled with? Dr Bella   When was the original appointment scheduled?    Please list date and time 4/30/24 12pm   At which location is the appointment scheduled to take place? [facility]   Was the appointment rescheduled?     Was the appointment changed from an in person visit to a virtual visit?    If so, please list the details of the change. 6/5/24 820   What is the reason for the appointment change? AM appt       Was STAR transport scheduled? N/A   Does STAR transport need to be scheduled for the new visit (if applicable) N/A   Does the patient need an infusion appointment rescheduled? N/A   Does the patient have an upcoming infusion appointment scheduled? If so, when? No   Is the patient undergoing chemotherapy? N/A   For appointments cancelled with less than 24 hours:  Was the no-show policy reviewed? N/A

## 2024-04-29 NOTE — TELEPHONE ENCOUNTER
Called patient per Dr Mcknight to reschedule appt for patient for latter hour . Left message with all info and with [facility] tel number

## 2024-06-05 ENCOUNTER — TELEPHONE (OUTPATIENT)
Dept: HEMATOLOGY ONCOLOGY | Facility: CLINIC | Age: 49
End: 2024-06-05

## 2024-06-14 ENCOUNTER — OFFICE VISIT (OUTPATIENT)
Dept: HEMATOLOGY ONCOLOGY | Facility: CLINIC | Age: 49
End: 2024-06-14
Payer: COMMERCIAL

## 2024-06-14 VITALS
RESPIRATION RATE: 16 BRPM | OXYGEN SATURATION: 100 % | HEIGHT: 63 IN | BODY MASS INDEX: 22.06 KG/M2 | DIASTOLIC BLOOD PRESSURE: 70 MMHG | TEMPERATURE: 97.8 F | HEART RATE: 68 BPM | WEIGHT: 124.5 LBS | SYSTOLIC BLOOD PRESSURE: 110 MMHG

## 2024-06-14 DIAGNOSIS — Z17.0 MALIGNANT NEOPLASM OF LOWER-INNER QUADRANT OF LEFT BREAST IN FEMALE, ESTROGEN RECEPTOR POSITIVE (HCC): Primary | ICD-10-CM

## 2024-06-14 DIAGNOSIS — C50.312 MALIGNANT NEOPLASM OF LOWER-INNER QUADRANT OF LEFT BREAST IN FEMALE, ESTROGEN RECEPTOR POSITIVE (HCC): Primary | ICD-10-CM

## 2024-06-14 PROCEDURE — 99214 OFFICE O/P EST MOD 30 MIN: CPT | Performed by: INTERNAL MEDICINE

## 2024-06-14 NOTE — PROGRESS NOTES
Hematology/Oncology Outpatient Follow- up Note  Saray Guo 48 y.o. female MRN: @ Encounter: [id number]        Date:  6/14/2024        Assessment / Plan:    1 stage Ia left breast cancer T1c ER positive CA positive H ER 2 negative.  MammaPrint high risk.  Plan: Patient will remain off hormonal therapy now.  She is doing well.  Returns here in 1 year.  She continues yearly mammogram.  No other major recommendations or suggestions.  She is status post lumpectomy chemotherapy radiation therapy and hormonal therapy x 10 years.      HPI: 48-year-old female here for follow-up she has not had menses in several years.  She may be postmenopausal.  She will discuss with her gynecologist.  Her history previously has a stage Ia left breast cancer grade 2 ER/CA positive H ER 2 negative.  Was high risk by MammaPrint.  She received TC x 4 cycles and then has been on tamoxifen since she has just completed 10 years of tamoxifen and will stop it.  Otherwise she is doing well.  Mammogram yearly up-to-date.  No pains in the breast chest abdomen or other areas of the body.  Feeling fairly well.      Interval History: Note from 4/25/2023:  Assessment / Plan:  A 47 year old premenopausal woman with stage IA left breast cancer, grade 2, ER/CA positive, HER-2 negative disease. Her disease was high risk, based on MammaPrint. She was treated with adjuvant chemotherapy with Taxotere and cyclophosphamide x4 cycles. Currently, she is on tamoxifen without significant side effects.  Clinically, she has no evidence of recurrent disease.  I recommended her to continue tamoxifen 20 mg daily for 1 more year to complete 10 years of adjuvant hormonal therapy.  She is in agreement with my recommendation.  I will see her again in a year for routine follow-up.         Subjective:    HPI:  A 38 year old premenopausal woman, who noticed a lump in the medial lower aspect of her left breast in October 2013. She brought this to the medical attention. She  was found to have abnormality based on imaging study. She underwent ultrasound-guided biopsy in October 22, 2013, which showed invasive mammary carcinoma, ER/IL positive, HER-2 negative disease. She also underwent right breast biopsy, as well as second biopsy of dislocation in the left breast. Both of which were negative for malignancy. She underwent lumpectomy with sentinel lymph node biopsy in November 19, 2013. Pathology revealed that 1.1 cm of invasive ductal carcinoma, grade 2. There is no evidence of lymphovascular invasion. 3 sentinel lymph node were all negative for metastatic disease. This was ER/IL positive, HER-2 negative disease. Her tumor tissue was sent for MammaPrint, which came back recently as high risk disease. Because of her young age, she underwent BRCA1/2 testing which showed no evidence of mutation. She has no family history of breast cancer or ovarian cancer. She presents today for initial consultation regarding adjuvant treatment. She feels well with no complaint of pain. She has no weight change. Recently. She has no respiratory symptoms. She has regular menstrual cycles. She has no other comorbidities.    Interval History:  A 47-year-old premenopausal woman, who has no evidence of BRCA mutation. She was diagnosed with stage IA left breast cancer, grade 2, ER/IL positive, HER-2 negative disease in November 2013. Her tumor was found to be high risk, based on the MammaPrint. Therefore, she was treated with adjuvant chemotherapy with Taxotere and cyclophosphamide x4 cycles. Adjuvant chemotherapy was completed in March 2014 with no significant toxicity.  she presents today for routine follow-up. She is currently on adjuvant hormonal therapy with tamoxifen with excellent tolerance.  She feels well with no new complaints.  She has mild hot flashes.  There is no complaint of bone pain.  She denied any respiratory symptoms.  Since her last visit a year ago, she has no menstrual cycles.  Her  performance status is normal.      Cancer Staging:  Cancer Staging   Malignant neoplasm of lower-inner quadrant of left breast in female, estrogen receptor positive (HCC)  Staging form: Breast, AJCC 7th Edition  - Pathologic stage from 11/19/2013: Stage IA (T1c, N0, cM0) - Signed by GURVINDER Dennis on 2/21/2024  Specimen type: Excision  Laterality: Left  Tumor size (mm): 11  Method of lymph node assessment: Caro lymph node biopsy  Histologic grade (G): G2  Estrogen receptor status: Positive  Percentage of positive estrogen receptors (%): 90  Progesterone receptor status: Positive  Percentage of positive progesterone receptors (%): 90  HER2 status: Negative      Molecular Testing:     Previous Hematologic/ Oncologic History:    Oncology History   Malignant neoplasm of lower-inner quadrant of left breast in female, estrogen receptor positive (HCC)   11/6/2013 Genetic Testing    BRCA negative  [facility]     11/19/2013 Surgery    Left lumpectomy with SLN biopsy  Invasive ductal carcinoma  1.1 cm  Grade 2  0/3 lymph nodes  ER 90  MD 90  Her 2 1+  Stage IA     11/19/2013 -  Cancer Staged    Staging form: Breast, AJCC 7th Edition  - Pathologic stage from 11/19/2013: Stage IA (T1c, N0, cM0) - Signed by GURVINDER Dennis on 2/21/2024  Specimen type: Excision  Laterality: Left  Tumor size (mm): 11  Method of lymph node assessment: Caro lymph node biopsy  Histologic grade (G): G2  Estrogen receptor status: Positive  Percentage of positive estrogen receptors (%): 90  Progesterone receptor status: Positive  Percentage of positive progesterone receptors (%): 90  HER2 status: Negative       12/20/2013 Genomic Testing    Mammaprint high risk     1/2014 - 3/2014 Chemotherapy    Adjuvant chemotherapy with Taxotere and cyclophosphamide x4, cycle. Completed in March 2014.      4/2014 -  Hormone Therapy    Tamoxifen 20 mg daily    10 years recommended  Dr. Weeks     4/22/2014 - 6/6/2014 Radiation     "Entire left breast to 5040 cGy with an additional  1000 cGy to the lumpectomy cavity  Dr. Arriaza         Current Hematologic/ Oncologic Treatment:       Cycle 1         Test Results:    Imaging: No results found.          Labs:   Lab Results   Component Value Date    WBC 5.81 05/12/2014    HGB 12.1 05/12/2014    HCT 38.1 05/12/2014    MCV 94 05/12/2014     05/12/2014     Lab Results   Component Value Date     03/17/2014    K 4.4 09/11/2023     09/11/2023    CO2 28 09/11/2023    ANIONGAP 10.2 03/17/2014    BUN 16 09/11/2023    CREATININE 0.65 09/11/2023    GLUCOSE 76 03/17/2014    GLUF 84 09/11/2023    CALCIUM 8.5 09/11/2023    AST 13 09/07/2019    ALT 12 09/07/2019    ALKPHOS 59 09/07/2019    PROT 7.3 03/17/2014    BILITOT 0.3 03/17/2014    EGFR 106 09/11/2023         No results found for: \"SPEP\", \"UPEP\"    No results found for: \"PSA\"    No results found for: \"CEA\"    No results found for: \"\"    No results found for: \"AFP\"    No results found for: \"IRON\", \"TIBC\", \"FERRITIN\"    No results found for: \"DGSPNTYR15\"      ROS: Review of Systems   Constitutional: Negative.    HENT: Negative.     Eyes: Negative.    Respiratory: Negative.     Cardiovascular: Negative.    Gastrointestinal: Negative.    Endocrine: Negative.    Genitourinary: Negative.    Musculoskeletal: Negative.    Skin: Negative.    Allergic/Immunologic: Negative.    Neurological: Negative.    Hematological: Negative.          Current Medications: Reviewed  Allergies: Reviewed  PMH/FH/SH:  Reviewed      Physical Exam:    Body surface area is 1.58 meters squared.    Wt Readings from Last 3 Encounters:   06/14/24 56.5 kg (124 lb 8 oz)   02/21/24 57.8 kg (127 lb 8 oz)   11/08/23 57.7 kg (127 lb 3.2 oz)        Temp Readings from Last 3 Encounters:   06/14/24 97.8 °F (36.6 °C) (Temporal)   02/21/24 97.5 °F (36.4 °C) (Tympanic)   09/11/23 97.5 °F (36.4 °C) (Tympanic)        BP Readings from Last 3 Encounters:   06/14/24 110/70   02/21/24 " 120/78   11/08/23 118/74         Pulse Readings from Last 3 Encounters:   06/14/24 68   02/21/24 67   09/11/23 78     @LASTSAO2(3)@      Physical Exam  Constitutional:       Appearance: Normal appearance. She is normal weight.   HENT:      Head: Normocephalic and atraumatic.   Eyes:      Extraocular Movements: Extraocular movements intact.      Conjunctiva/sclera: Conjunctivae normal.      Pupils: Pupils are equal, round, and reactive to light.   Cardiovascular:      Rate and Rhythm: Normal rate and regular rhythm.      Heart sounds: Normal heart sounds.   Pulmonary:      Effort: Pulmonary effort is normal.      Breath sounds: Normal breath sounds.   Abdominal:      General: Abdomen is flat. Bowel sounds are normal.      Palpations: Abdomen is soft.   Musculoskeletal:         General: Normal range of motion.      Cervical back: Normal range of motion and neck supple.   Skin:     General: Skin is warm and dry.   Neurological:      General: No focal deficit present.      Mental Status: She is alert and oriented to person, place, and time. Mental status is at baseline.     No masses noted in either breast no axillary adenopathy no pain on palpation      Goals and Barriers:  Current Goal: Prolong Survival from Cancer.   Barriers: None.      Patient's Capacity to Self Care:  Patient is able to self care.    Code Status: [unfilled]  Advance Directive and Living Will:      Power of :

## 2024-06-24 ENCOUNTER — HOSPITAL ENCOUNTER (OUTPATIENT)
Dept: ULTRASOUND IMAGING | Facility: CLINIC | Age: 49
Discharge: HOME | End: 2024-06-24
Payer: COMMERCIAL

## 2024-06-24 ENCOUNTER — HOSPITAL ENCOUNTER (OUTPATIENT)
Dept: MAMMOGRAPHY | Facility: CLINIC | Age: 49
Discharge: HOME | End: 2024-06-24
Payer: COMMERCIAL

## 2024-06-24 DIAGNOSIS — Z12.31 VISIT FOR SCREENING MAMMOGRAM: ICD-10-CM

## 2024-06-24 DIAGNOSIS — Z12.39 ENCOUNTER FOR BREAST CANCER SCREENING OTHER THAN MAMMOGRAM: ICD-10-CM

## 2024-06-24 DIAGNOSIS — R92.30 DENSE BREAST: ICD-10-CM

## 2024-06-24 PROCEDURE — 77067 SCR MAMMO BI INCL CAD: CPT

## 2024-06-24 PROCEDURE — 76641 ULTRASOUND BREAST COMPLETE: CPT

## 2024-06-24 PROCEDURE — 77063 BREAST TOMOSYNTHESIS BI: CPT

## 2024-09-12 ENCOUNTER — APPOINTMENT (OUTPATIENT)
Dept: LAB | Facility: CLINIC | Age: 49
End: 2024-09-12
Payer: COMMERCIAL

## 2024-09-12 ENCOUNTER — OFFICE VISIT (OUTPATIENT)
Dept: FAMILY MEDICINE CLINIC | Facility: CLINIC | Age: 49
End: 2024-09-12
Payer: COMMERCIAL

## 2024-09-12 VITALS
SYSTOLIC BLOOD PRESSURE: 102 MMHG | HEIGHT: 63 IN | BODY MASS INDEX: 22.86 KG/M2 | DIASTOLIC BLOOD PRESSURE: 56 MMHG | WEIGHT: 129 LBS | HEART RATE: 71 BPM | OXYGEN SATURATION: 99 % | TEMPERATURE: 97.1 F

## 2024-09-12 DIAGNOSIS — Z00.00 ANNUAL PHYSICAL EXAM: ICD-10-CM

## 2024-09-12 DIAGNOSIS — Z53.20 COLON CANCER SCREENING DECLINED: ICD-10-CM

## 2024-09-12 DIAGNOSIS — Z00.00 ANNUAL PHYSICAL EXAM: Primary | ICD-10-CM

## 2024-09-12 LAB
ANION GAP SERPL CALCULATED.3IONS-SCNC: 4 MMOL/L (ref 4–13)
BASOPHILS # BLD AUTO: 0.04 THOUSANDS/ΜL (ref 0–0.1)
BASOPHILS NFR BLD AUTO: 1 % (ref 0–1)
BUN SERPL-MCNC: 13 MG/DL (ref 5–25)
CALCIUM SERPL-MCNC: 8.5 MG/DL (ref 8.4–10.2)
CHLORIDE SERPL-SCNC: 108 MMOL/L (ref 96–108)
CHOLEST SERPL-MCNC: 136 MG/DL
CO2 SERPL-SCNC: 30 MMOL/L (ref 21–32)
CREAT SERPL-MCNC: 0.67 MG/DL (ref 0.6–1.3)
EOSINOPHIL # BLD AUTO: 0.12 THOUSAND/ΜL (ref 0–0.61)
EOSINOPHIL NFR BLD AUTO: 2 % (ref 0–6)
ERYTHROCYTE [DISTWIDTH] IN BLOOD BY AUTOMATED COUNT: 13.8 % (ref 11.6–15.1)
GFR SERPL CREATININE-BSD FRML MDRD: 105 ML/MIN/1.73SQ M
GLUCOSE P FAST SERPL-MCNC: 95 MG/DL (ref 65–99)
HCT VFR BLD AUTO: 27.1 % (ref 34.8–46.1)
HDLC SERPL-MCNC: 54 MG/DL
HGB BLD-MCNC: 8.7 G/DL (ref 11.5–15.4)
IMM GRANULOCYTES # BLD AUTO: 0.04 THOUSAND/UL (ref 0–0.2)
IMM GRANULOCYTES NFR BLD AUTO: 1 % (ref 0–2)
LDLC SERPL CALC-MCNC: 70 MG/DL (ref 0–100)
LYMPHOCYTES # BLD AUTO: 1.99 THOUSANDS/ΜL (ref 0.6–4.47)
LYMPHOCYTES NFR BLD AUTO: 28 % (ref 14–44)
MCH RBC QN AUTO: 31.8 PG (ref 26.8–34.3)
MCHC RBC AUTO-ENTMCNC: 32.1 G/DL (ref 31.4–37.4)
MCV RBC AUTO: 99 FL (ref 82–98)
MONOCYTES # BLD AUTO: 0.6 THOUSAND/ΜL (ref 0.17–1.22)
MONOCYTES NFR BLD AUTO: 9 % (ref 4–12)
NEUTROPHILS # BLD AUTO: 4.29 THOUSANDS/ΜL (ref 1.85–7.62)
NEUTS SEG NFR BLD AUTO: 59 % (ref 43–75)
NONHDLC SERPL-MCNC: 82 MG/DL
NRBC BLD AUTO-RTO: 0 /100 WBCS
PLATELET # BLD AUTO: 229 THOUSANDS/UL (ref 149–390)
PMV BLD AUTO: 11.4 FL (ref 8.9–12.7)
POTASSIUM SERPL-SCNC: 4.2 MMOL/L (ref 3.5–5.3)
RBC # BLD AUTO: 2.74 MILLION/UL (ref 3.81–5.12)
SODIUM SERPL-SCNC: 142 MMOL/L (ref 135–147)
TRIGL SERPL-MCNC: 60 MG/DL
WBC # BLD AUTO: 7.08 THOUSAND/UL (ref 4.31–10.16)

## 2024-09-12 PROCEDURE — 80061 LIPID PANEL: CPT

## 2024-09-12 PROCEDURE — 80048 BASIC METABOLIC PNL TOTAL CA: CPT

## 2024-09-12 PROCEDURE — 99396 PREV VISIT EST AGE 40-64: CPT | Performed by: FAMILY MEDICINE

## 2024-09-12 PROCEDURE — 36415 COLL VENOUS BLD VENIPUNCTURE: CPT

## 2024-09-12 PROCEDURE — 85025 COMPLETE CBC W/AUTO DIFF WBC: CPT

## 2024-09-12 NOTE — PATIENT INSTRUCTIONS
"Patient Education     Routine physical for adults   The Basics   Written by the doctors and editors at [facility]   What is a physical? -- A physical is a routine visit, or \"check-up,\" with your doctor. You might also hear it called a \"wellness visit\" or \"preventive visit.\"  During each visit, the doctor will:   Ask about your physical and mental health   Ask about your habits, behaviors, and lifestyle   Do an exam   Give you vaccines if needed   Talk to you about any medicines you take   Give advice about your health   Answer your questions  Getting regular check-ups is an important part of taking care of your health. It can help your doctor find and treat any problems you have. But it's also important for preventing health problems.  A routine physical is different from a \"sick visit.\" A sick visit is when you see a doctor because of a health concern or problem. Since physicals are scheduled ahead of time, you can think about what you want to ask the doctor.  How often should I get a physical? -- It depends on your age and health. In general, for people age 21 years and older:   If you are younger than 50 years, you might be able to get a physical every 3 years.   If you are 50 years or older, your doctor might recommend a physical every year.  If you have an ongoing health condition, like diabetes or high blood pressure, your doctor will probably want to see you more often.  What happens during a physical? -- In general, each visit will include:   Physical exam - The doctor or nurse will check your height, weight, heart rate, and blood pressure. They will also look at your eyes and ears. They will ask about how you are feeling and whether you have any symptoms that bother you.   Medicines - It's a good idea to bring a list of all the medicines you take to each doctor visit. Your doctor will talk to you about your medicines and answer any questions. Tell them if you are having any side effects that bother you. You " "should also tell them if you are having trouble paying for any of your medicines.   Habits and behaviors - This includes:   Your diet   Your exercise habits   Whether you smoke, drink alcohol, or use drugs   Whether you are sexually active   Whether you feel safe at home  Your doctor will talk to you about things you can do to improve your health and lower your risk of health problems. They will also offer help and support. For example, if you want to quit smoking, they can give you advice and might prescribe medicines. If you want to improve your diet or get more physical activity, they can help you with this, too.   Lab tests, if needed - The tests you get will depend on your age and situation. For example, your doctor might want to check your:   Cholesterol   Blood sugar   Iron level   Vaccines - The recommended vaccines will depend on your age, health, and what vaccines you already had. Vaccines are very important because they can prevent certain serious or deadly infections.   Discussion of screening - \"Screening\" means checking for diseases or other health problems before they cause symptoms. Your doctor can recommend screening based on your age, risk, and preferences. This might include tests to check for:   Cancer, such as breast, prostate, cervical, ovarian, colorectal, prostate, lung, or skin cancer   Sexually transmitted infections, such as chlamydia and gonorrhea   Mental health conditions like depression and anxiety  Your doctor will talk to you about the different types of screening tests. They can help you decide which screenings to have. They can also explain what the results might mean.   Answering questions - The physical is a good time to ask the doctor or nurse questions about your health. If needed, they can refer you to other doctors or specialists, too.  Adults older than 65 years often need other care, too. As you get older, your doctor will talk to you about:   How to prevent falling at " home   Hearing or vision tests   Memory testing   How to take your medicines safely   Making sure that you have the help and support you need at home  All topics are updated as new evidence becomes available and our peer review process is complete.  This topic retrieved from [employer] on: May 02, 2024.  Topic 645946 Version 1.0  Release: 32.4.3 - C32.122  © 2024 UpToDate, Inc. and/or its affiliates. All rights reserved.  Consumer Information Use and Disclaimer   Disclaimer: This generalized information is a limited summary of diagnosis, treatment, and/or medication information. It is not meant to be comprehensive and should be used as a tool to help the user understand and/or assess potential diagnostic and treatment options. It does NOT include all information about conditions, treatments, medications, side effects, or risks that may apply to a specific patient. It is not intended to be medical advice or a substitute for the medical advice, diagnosis, or treatment of a health care provider based on the health care provider's examination and assessment of a patient's specific and unique circumstances. Patients must speak with a health care provider for complete information about their health, medical questions, and treatment options, including any risks or benefits regarding use of medications. This information does not endorse any treatments or medications as safe, effective, or approved for treating a specific patient. UpToDate, Inc. and its affiliates disclaim any warranty or liability relating to this information or the use thereof.The use of this information is governed by the Terms of Use, available at https://www.[employer].com/en/know/clinical-effectiveness-terms. 2024© UpToDate, Inc. and its affiliates and/or licensors. All rights reserved.  Copyright   © 2024 UpToDate, Inc. and/or its affiliates. All rights reserved.

## 2024-09-12 NOTE — PROGRESS NOTES
"Adult Annual Physical  Name: Saray Guo      : 1975      MRN: [id number]  Encounter Provider: Jacey Mcnamara DO  Encounter Date: 2024   Encounter department: [facility] [address] [place] [address]    Assessment & Plan  Annual physical exam    Orders:    CBC and differential; Future    Basic metabolic panel; Future    Lipid panel; Future    Colon cancer screening declined         Immunizations and preventive care screenings were discussed with patient today. Appropriate education was printed on patient's after visit summary.    Counseling:  Alcohol/drug use: discussed moderation in alcohol intake, the recommendations for healthy alcohol use, and avoidance of illicit drug use.  Dental Health: discussed importance of regular tooth brushing, flossing, and dental visits.  Sexual health: discussed sexually transmitted diseases, partner selection, use of condoms, avoidance of unintended pregnancy, and contraceptive alternatives.  Exercise: the importance of regular exercise/physical activity was discussed. Recommend exercise 3-5 times per week for at least 30 minutes.      History of Present Illness     Adult Annual Physical:  Patient presents for annual physical.     Diet and Physical Activity:  - Diet/Nutrition: well balanced diet.  - Exercise: 3-4 times a week on average and walking.    Depression Screening:  - PHQ-2 Score: 0    General Health:  - Sleep: sleeps well.  - Hearing: normal hearing bilateral ears.  - Vision: wears glasses and most recent eye exam > 1 year ago.  - Dental: regular dental visits and brushes teeth twice daily. flossing daily    /GYN Health:  - Follows with GYN: yes.   - Menopause: premenopausal.   - Last menstrual cycle: 2024.     Review of Systems      Objective     /56   Pulse 71   Temp (!) 97.1 °F (36.2 °C) (Tympanic)   Ht 5' 3\" (1.6 m)   Wt 58.5 kg (129 lb)   LMP 2020   SpO2 99%   BMI 22.85 kg/m²     Physical Exam  Vitals " reviewed.   Constitutional:       General: She is not in acute distress.     Appearance: Normal appearance.   HENT:      Head: Normocephalic and atraumatic.      Right Ear: External ear normal.      Left Ear: External ear normal.      Nose: Nose normal.      Mouth/Throat:      Mouth: Mucous membranes are moist.   Eyes:      Extraocular Movements: Extraocular movements intact.      Conjunctiva/sclera: Conjunctivae normal.      Pupils: Pupils are equal, round, and reactive to light.   Cardiovascular:      Rate and Rhythm: Normal rate and regular rhythm.      Heart sounds: Normal heart sounds.   Pulmonary:      Effort: Pulmonary effort is normal.      Breath sounds: Normal breath sounds.   Abdominal:      General: Bowel sounds are normal. There is no distension.      Palpations: Abdomen is soft.      Tenderness: There is no abdominal tenderness.   Musculoskeletal:      Cervical back: Neck supple.      Right lower leg: No edema.      Left lower leg: No edema.   Lymphadenopathy:      Cervical: No cervical adenopathy.   Skin:     General: Skin is warm.      Capillary Refill: Capillary refill takes less than 2 seconds.      Findings: No rash.   Neurological:      Mental Status: She is alert. Mental status is at baseline.           DO Guru Abdalla [facility]  9/12/2024 10:00 AM

## 2024-09-13 ENCOUNTER — APPOINTMENT (OUTPATIENT)
Dept: LAB | Facility: CLINIC | Age: 49
End: 2024-09-13
Payer: COMMERCIAL

## 2024-09-13 DIAGNOSIS — D64.9 ANEMIA, UNSPECIFIED TYPE: Primary | ICD-10-CM

## 2024-09-13 DIAGNOSIS — D64.9 ANEMIA, UNSPECIFIED TYPE: ICD-10-CM

## 2024-09-13 LAB
BACTERIA UR QL AUTO: NORMAL /HPF
BILIRUB UR QL STRIP: NEGATIVE
CLARITY UR: CLEAR
COLOR UR: COLORLESS
FERRITIN SERPL-MCNC: 40 NG/ML (ref 11–307)
FOLATE SERPL-MCNC: >22.3 NG/ML
GLUCOSE UR STRIP-MCNC: NEGATIVE MG/DL
HGB UR QL STRIP.AUTO: ABNORMAL
IRON SATN MFR SERPL: 17 % (ref 15–50)
IRON SERPL-MCNC: 49 UG/DL (ref 50–212)
KETONES UR STRIP-MCNC: NEGATIVE MG/DL
LEUKOCYTE ESTERASE UR QL STRIP: NEGATIVE
NITRITE UR QL STRIP: NEGATIVE
NON-SQ EPI CELLS URNS QL MICRO: NORMAL /HPF
PH UR STRIP.AUTO: 7 [PH]
PROT UR STRIP-MCNC: NEGATIVE MG/DL
RBC #/AREA URNS AUTO: NORMAL /HPF
SP GR UR STRIP.AUTO: 1.01 (ref 1–1.03)
TIBC SERPL-MCNC: 291 UG/DL (ref 250–450)
UIBC SERPL-MCNC: 242 UG/DL (ref 155–355)
UROBILINOGEN UR STRIP-ACNC: <2 MG/DL
VIT B12 SERPL-MCNC: 458 PG/ML (ref 180–914)
WBC #/AREA URNS AUTO: NORMAL /HPF

## 2024-09-13 PROCEDURE — 82746 ASSAY OF FOLIC ACID SERUM: CPT

## 2024-09-13 PROCEDURE — 83540 ASSAY OF IRON: CPT

## 2024-09-13 PROCEDURE — 82728 ASSAY OF FERRITIN: CPT

## 2024-09-13 PROCEDURE — 83550 IRON BINDING TEST: CPT

## 2024-09-13 PROCEDURE — 81001 URINALYSIS AUTO W/SCOPE: CPT

## 2024-09-13 PROCEDURE — 82607 VITAMIN B-12: CPT

## 2024-09-13 PROCEDURE — 36415 COLL VENOUS BLD VENIPUNCTURE: CPT

## 2024-09-13 NOTE — PROGRESS NOTES
Patient called back - I read doctor's note, verbatim. She said that she had not had a period for 4 years because she was on Tamoxifen.  She was recently taken off and just go her period for the first time on Friday.  She has been bleeding heavy for 7 days but a bit lighter today.  No history of anemia.  I told her there are labs in for her.  Patient understood and had no further questions.

## 2024-09-17 ENCOUNTER — TELEPHONE (OUTPATIENT)
Dept: FAMILY MEDICINE CLINIC | Facility: CLINIC | Age: 49
End: 2024-09-17

## 2024-11-15 ENCOUNTER — OFFICE VISIT (OUTPATIENT)
Dept: FAMILY MEDICINE CLINIC | Facility: CLINIC | Age: 49
End: 2024-11-15
Payer: COMMERCIAL

## 2024-11-15 VITALS
HEIGHT: 63 IN | BODY MASS INDEX: 22.54 KG/M2 | DIASTOLIC BLOOD PRESSURE: 76 MMHG | WEIGHT: 127.2 LBS | HEART RATE: 83 BPM | TEMPERATURE: 97.6 F | SYSTOLIC BLOOD PRESSURE: 118 MMHG | OXYGEN SATURATION: 99 %

## 2024-11-15 DIAGNOSIS — H65.191 ACUTE MUCOID OTITIS MEDIA OF RIGHT EAR: Primary | ICD-10-CM

## 2024-11-15 DIAGNOSIS — J06.9 UPPER RESPIRATORY TRACT INFECTION, UNSPECIFIED TYPE: ICD-10-CM

## 2024-11-15 LAB
SL AMB POCT RAPID FLU A: NEGATIVE
SL AMB POCT RAPID FLU B: NEGATIVE

## 2024-11-15 PROCEDURE — 87804 INFLUENZA ASSAY W/OPTIC: CPT | Performed by: PHYSICIAN ASSISTANT

## 2024-11-15 PROCEDURE — 99213 OFFICE O/P EST LOW 20 MIN: CPT | Performed by: PHYSICIAN ASSISTANT

## 2024-11-15 RX ORDER — AZITHROMYCIN 250 MG/1
TABLET, FILM COATED ORAL
Qty: 6 TABLET | Refills: 0 | Status: SHIPPED | OUTPATIENT
Start: 2024-11-15 | End: 2024-11-19

## 2024-11-15 NOTE — PROGRESS NOTES
"Name: Saray Guo      : 1975      MRN: [id number]  Encounter Provider: Kylee Bernal PA-C  Encounter Date: 11/15/2024   Encounter department: [facility] [address]  :  Assessment & Plan  Acute mucoid otitis media of right ear  Sudafed as directed  Orders:    azithromycin (ZITHROMAX) 250 mg tablet; Take 2 tablets today then 1 tablet daily x 4 days    Upper respiratory tract infection, unspecified type    Orders:    POCT rapid flu A and B    Results for orders placed or performed in visit on 11/15/24   POCT rapid flu A and B    Collection Time: 11/15/24 12:09 PM   Result Value Ref Range    RAPID FLU A Negative     RAPID FLU B Negative              History of Present Illness     Pt has had fever, congestion, cough, sore throat for about a week now. She has had some improvement but still with cough and congestion. She noticed some right ear pain this morning. She took 2 COVID tests this week which were both negative.      Review of Systems   Constitutional:  Positive for fatigue and fever.   HENT:  Positive for congestion, ear pain, postnasal drip and sore throat. Negative for ear discharge and trouble swallowing.    Respiratory:  Positive for cough. Negative for chest tightness and shortness of breath.    Gastrointestinal:  Negative for abdominal pain.   Neurological:  Negative for dizziness and light-headedness.          Objective   /76   Pulse 83   Temp 97.6 °F (36.4 °C) (Tympanic)   Ht 5' 3\" (1.6 m)   Wt 57.7 kg (127 lb 3.2 oz)   LMP 2020   SpO2 99%   BMI 22.53 kg/m²      Physical Exam  Vitals and nursing note reviewed.   Constitutional:       Appearance: Normal appearance.   HENT:      Head: Normocephalic.      Right Ear: Ear canal and external ear normal. A middle ear effusion is present. Tympanic membrane is erythematous.      Left Ear: Tympanic membrane, ear canal and external ear normal.      Nose: Congestion present.      " Mouth/Throat:      Pharynx: Posterior oropharyngeal erythema present. No oropharyngeal exudate.   Eyes:      Conjunctiva/sclera: Conjunctivae normal.   Cardiovascular:      Rate and Rhythm: Normal rate and regular rhythm.      Heart sounds: Normal heart sounds.   Pulmonary:      Effort: Pulmonary effort is normal.      Breath sounds: Normal breath sounds.   Lymphadenopathy:      Cervical: Cervical adenopathy present.   Neurological:      Mental Status: She is alert.   Psychiatric:         Mood and Affect: Mood normal.         Behavior: Behavior normal.

## 2024-11-26 ENCOUNTER — ANNUAL EXAM (OUTPATIENT)
Dept: OBGYN CLINIC | Facility: CLINIC | Age: 49
End: 2024-11-26
Payer: COMMERCIAL

## 2024-11-26 VITALS
SYSTOLIC BLOOD PRESSURE: 116 MMHG | HEIGHT: 63 IN | WEIGHT: 127.8 LBS | DIASTOLIC BLOOD PRESSURE: 72 MMHG | BODY MASS INDEX: 22.64 KG/M2

## 2024-11-26 DIAGNOSIS — N92.4 ABNORMAL PERIMENOPAUSAL BLEEDING: ICD-10-CM

## 2024-11-26 DIAGNOSIS — R92.333 HETEROGENEOUSLY DENSE TISSUE OF BOTH BREASTS ON MAMMOGRAPHY: ICD-10-CM

## 2024-11-26 DIAGNOSIS — Z12.31 ENCOUNTER FOR SCREENING MAMMOGRAM FOR BREAST CANCER: ICD-10-CM

## 2024-11-26 DIAGNOSIS — Z85.3 HISTORY OF BREAST CANCER: ICD-10-CM

## 2024-11-26 DIAGNOSIS — Z12.11 COLON CANCER SCREENING: ICD-10-CM

## 2024-11-26 DIAGNOSIS — Z01.419 ENCOUNTER FOR ANNUAL ROUTINE GYNECOLOGICAL EXAMINATION: Primary | ICD-10-CM

## 2024-11-26 PROCEDURE — 99396 PREV VISIT EST AGE 40-64: CPT | Performed by: OBSTETRICS & GYNECOLOGY

## 2024-11-26 NOTE — PROGRESS NOTES
Name: Saray Guo      : 1975      MRN: [id number]  Encounter Provider: Yeimi Castro MD  Encounter Date: 2024   Encounter department: [facility]  :  Assessment & Plan  Encounter for annual routine gynecological examination  Pap/HPV current  Mammogram/Ultrasound ordered  Colonoscopy, recommended    Encourage healthy diet, exercise, Calcium 1200mg per day and at least 800 iu Vitamin D daily.         History of breast cancer    Orders:    US pelvis complete w transvaginal; Future    Abnormal perimenopausal bleeding  Episode of bleeding x 2  Felt like menses, PMS symptoms then 5 days of very heavy bleeding  Next month, few days of old blood after sex  Stopped tamoxifen this summer. No menses x 4 years  Orders:    US pelvis complete w transvaginal; Future    Encounter for screening mammogram for breast cancer    Orders:    Mammo screening bilateral w 3d and cad; Future    Heterogeneously dense tissue of both breasts on mammography    Orders:    US breast screening bilateral complete (ABUS); Future    Colon cancer screening             History of Present Illness     Vaginal bleeding x 2 episodes  Stopped tamoxifen this summer        Gynecologic Exam  She complains of vaginal bleeding. She reports no genital itching, genital lesions, genital odor, genital rash, pelvic pain or vaginal discharge. This is a new problem. The current episode started more than 1 month ago. The problem occurs intermittently. The problem has been resolved since onset. The patient is experiencing no pain. Pertinent negatives include no chills, constipation, diarrhea, fever, nausea, sore throat or vomiting.     Saray Guo is a 49 y.o. female who presents for a routine annual visit    Menarche- Y/O  Last Pap Smear- 10/5/21 neg/neg  LMP- 24   Birth control-condoms  Mammogram and ABUS-24. New orders pended.   Colonoscopy-referral deferred    Non smoker  Social  "drinker  Currently sexually active  No family history of uterine, ovarian, cervical or breast cancer    Had bleeding 9/6/24 for the first time in 4 years. Brown spotting in October.    No concerns/questions for today's visit      Review of Systems   Constitutional:  Negative for activity change, appetite change, chills, fatigue and fever.   HENT:  Negative for rhinorrhea, sneezing and sore throat.    Eyes:  Negative for visual disturbance.   Respiratory:  Negative for cough, shortness of breath and wheezing.    Cardiovascular:  Negative for chest pain, palpitations and leg swelling.   Gastrointestinal:  Negative for abdominal distention, constipation, diarrhea, nausea and vomiting.   Genitourinary:  Negative for difficulty urinating, pelvic pain and vaginal discharge.   Neurological:  Negative for syncope and light-headedness.          Objective   /72 (BP Location: Right arm, Patient Position: Sitting, Cuff Size: Standard)   Ht 5' 3\" (1.6 m)   Wt 58 kg (127 lb 12.8 oz)   LMP 11/26/2020   BMI 22.64 kg/m²      Physical Exam  Chest:   Breasts:     Breasts are symmetrical.      Right: No inverted nipple, mass, nipple discharge, skin change or tenderness.      Left: No inverted nipple, mass, nipple discharge, skin change or tenderness.   Genitourinary:     Labia:         Right: No rash, tenderness, lesion or injury.         Left: No rash, tenderness, lesion or injury.       Vagina: Normal. No vaginal discharge, erythema, tenderness or bleeding.      Cervix: No cervical motion tenderness, discharge, friability, erythema or cervical bleeding.      Uterus: Not deviated, not enlarged, not fixed and not tender.       Adnexa:         Right: No mass, tenderness or fullness.          Left: No mass, tenderness or fullness.     Neurological:      Mental Status: She is alert and oriented to person, place, and time.           "

## 2024-12-15 PROBLEM — J06.9 VIRAL UPPER RESPIRATORY TRACT INFECTION: Status: RESOLVED | Noted: 2024-11-15 | Resolved: 2024-12-15

## 2025-01-03 ENCOUNTER — HOSPITAL ENCOUNTER (OUTPATIENT)
Dept: ULTRASOUND IMAGING | Facility: CLINIC | Age: 50
Discharge: HOME | End: 2025-01-03
Payer: COMMERCIAL

## 2025-01-03 DIAGNOSIS — N92.4 ABNORMAL PERIMENOPAUSAL BLEEDING: ICD-10-CM

## 2025-01-03 DIAGNOSIS — Z85.3 HISTORY OF BREAST CANCER: ICD-10-CM

## 2025-01-03 PROCEDURE — 76830 TRANSVAGINAL US NON-OB: CPT

## 2025-01-03 PROCEDURE — 76856 US EXAM PELVIC COMPLETE: CPT

## 2025-01-08 ENCOUNTER — RESULTS FOLLOW-UP (OUTPATIENT)
Dept: OBGYN CLINIC | Facility: CLINIC | Age: 50
End: 2025-01-08

## 2025-02-24 ENCOUNTER — TELEPHONE (OUTPATIENT)
Age: 50
End: 2025-02-24

## 2025-02-24 ENCOUNTER — OFFICE VISIT (OUTPATIENT)
Dept: SURGICAL ONCOLOGY | Facility: CLINIC | Age: 50
End: 2025-02-24
Payer: COMMERCIAL

## 2025-02-24 VITALS
BODY MASS INDEX: 23.74 KG/M2 | WEIGHT: 134 LBS | OXYGEN SATURATION: 99 % | TEMPERATURE: 97.3 F | HEIGHT: 63 IN | HEART RATE: 80 BPM | SYSTOLIC BLOOD PRESSURE: 106 MMHG | DIASTOLIC BLOOD PRESSURE: 64 MMHG

## 2025-02-24 DIAGNOSIS — Z08 ENCOUNTER FOR FOLLOW-UP EXAMINATION AFTER COMPLETED TREATMENT FOR MALIGNANT NEOPLASM: Primary | ICD-10-CM

## 2025-02-24 DIAGNOSIS — C50.312 MALIGNANT NEOPLASM OF LOWER-INNER QUADRANT OF LEFT BREAST IN FEMALE, ESTROGEN RECEPTOR POSITIVE (HCC): ICD-10-CM

## 2025-02-24 DIAGNOSIS — Z17.0 MALIGNANT NEOPLASM OF LOWER-INNER QUADRANT OF LEFT BREAST IN FEMALE, ESTROGEN RECEPTOR POSITIVE (HCC): ICD-10-CM

## 2025-02-24 PROCEDURE — 99213 OFFICE O/P EST LOW 20 MIN: CPT

## 2025-02-24 NOTE — ASSESSMENT & PLAN NOTE
Patient is a 49-year-old female presenting today for a 1 year follow-up for history of left breast cancer diagnosed in November 2013. Pathology revealed invasive ductal carcinoma ER/DC positive, HER2 negative. She had a left breast lumpectomy with sentinel node biopsy with Dr. Alvarado. There was no lymph node involvement. She had adjuvant chemotherapy as well as whole breast radiation therapy. She completed 10 years of tamoxifen therapy in June 2023. Mammo and ABUS last year were benign. Orders are in for imaging this year, but testing is not yet scheduled. I will request imaging is scheduled at check out. Patient states her menses began again in Nov 2023 for the first time since d/c tamoxifen. She had very heavy bleeding at that time. Her endometrial lining is thickened and she is undergoing bx tomorrow due to hx of tamoxifen. She mentions cyclical breast pain, worse in the left than the right breast. Her breasts are tender on palpation. I attribute this to hormonal fluctuations and fibrocystic changes. I recommend she try EPO again, use warm compress, and take Advil as needed for pain. She denies persistent headaches, bone pain, back pain, shortness of breath, or abdominal pain. There were no concerns on her breast exam. I will see the patient back in 1 year or sooner should the need arise. She was instructed to call with any questions or concerns prior to this time. All questions were answered today.

## 2025-02-24 NOTE — PROGRESS NOTES
Name: Saray Guo      : 1975      MRN: [id number]  Encounter Provider: GURVINDER Dennis  Encounter Date: 2025   Encounter department: [facility]  :  Assessment & Plan  Encounter for follow-up examination after completed treatment for malignant neoplasm  - 1 year f/u       Malignant neoplasm of lower-inner quadrant of left breast in female, estrogen receptor positive (HCC)  Patient is a 49-year-old female presenting today for a 1 year follow-up for history of left breast cancer diagnosed in 2013. Pathology revealed invasive ductal carcinoma ER/AZ positive, HER2 negative. She had a left breast lumpectomy with sentinel node biopsy with Dr. Alvarado. There was no lymph node involvement. She had adjuvant chemotherapy as well as whole breast radiation therapy. She completed 10 years of tamoxifen therapy in 2023. Mammo and ABUS last year were benign. Orders are in for imaging this year, but testing is not yet scheduled. I will request imaging is scheduled at check out. Patient states her menses began again in 2023 for the first time since d/c tamoxifen. She had very heavy bleeding at that time. Her endometrial lining is thickened and she is undergoing bx tomorrow due to hx of tamoxifen. She mentions cyclical breast pain, worse in the left than the right breast. Her breasts are tender on palpation. I attribute this to hormonal fluctuations and fibrocystic changes. I recommend she try EPO again, use warm compress, and take Advil as needed for pain. She denies persistent headaches, bone pain, back pain, shortness of breath, or abdominal pain. There were no concerns on her breast exam. I will see the patient back in 1 year or sooner should the need arise. She was instructed to call with any questions or concerns prior to this time. All questions were answered today.            Oncology History   Cancer Staging   Malignant neoplasm of  lower-inner quadrant of left breast in female, estrogen receptor positive (HCC)  Staging form: Breast, AJCC 7th Edition  - Pathologic stage from 11/19/2013: Stage IA (T1c, N0, cM0) - Signed by GURVINDER Dennis on 2/21/2024  Specimen type: Excision  Laterality: Left  Tumor size (mm): 11  Method of lymph node assessment: Pompey lymph node biopsy  Histologic grade (G): G2  Estrogen receptor status: Positive  Percentage of positive estrogen receptors (%): 90  Progesterone receptor status: Positive  Percentage of positive progesterone receptors (%): 90  HER2 status: Negative  Oncology History   Malignant neoplasm of lower-inner quadrant of left breast in female, estrogen receptor positive (HCC)   11/6/2013 Genetic Testing    BRCA negative  [facility]     11/19/2013 Surgery    Left lumpectomy with SLN biopsy  Invasive ductal carcinoma  1.1 cm  Grade 2  0/3 lymph nodes  ER 90  [place] 90  Her 2 1+  Stage IA     11/19/2013 -  Cancer Staged    Staging form: Breast, AJCC 7th Edition  - Pathologic stage from 11/19/2013: Stage IA (T1c, N0, cM0) - Signed by GURVINDER Dennis on 2/21/2024  Specimen type: Excision  Laterality: Left  Tumor size (mm): 11  Method of lymph node assessment: Pompey lymph node biopsy  Histologic grade (G): G2  Estrogen receptor status: Positive  Percentage of positive estrogen receptors (%): 90  Progesterone receptor status: Positive  Percentage of positive progesterone receptors (%): 90  HER2 status: Negative       12/20/2013 Genomic Testing    Mammaprint high risk     1/2014 - 3/2014 Chemotherapy    Adjuvant chemotherapy with Taxotere and cyclophosphamide x4, cycle. Completed in March 2014.      4/2014 -  Hormone Therapy    Tamoxifen 20 mg daily    10 years recommended  Dr. Weeks     4/22/2014 - 6/6/2014 Radiation    Entire left breast to 5040 cGy with an additional  1000 cGy to the lumpectomy cavity   Andshital        Review of Systems   Constitutional:  Negative for activity  "change, appetite change, fatigue and unexpected weight change.   Respiratory:  Negative for cough and shortness of breath.    Cardiovascular:  Negative for chest pain.   Gastrointestinal:  Negative for abdominal pain, diarrhea, nausea and vomiting.   Endocrine: Negative for heat intolerance.   Genitourinary:  Positive for menstrual problem.   Musculoskeletal:  Negative for arthralgias, back pain and myalgias.   Skin:  Negative for rash.   Neurological:  Negative for weakness and headaches.   Hematological:  Negative for adenopathy.    A complete review of systems is negative other than that noted above in the HPI.    Objective   /64   Pulse 80   Temp (!) 97.3 °F (36.3 °C)   Ht 5' 3\" (1.6 m)   Wt 60.8 kg (134 lb)   LMP 11/26/2020   SpO2 99%   BMI 23.74 kg/m²     Pain Screening:  Pain Score: 0-No pain  ECOG    Physical Exam  Constitutional:       General: She is not in acute distress.     Appearance: Normal appearance.   Cardiovascular:      Rate and Rhythm: Normal rate and regular rhythm.      Pulses: Normal pulses.      Heart sounds: Normal heart sounds.   Pulmonary:      Effort: Pulmonary effort is normal.      Breath sounds: Normal breath sounds.   Chest:      Chest wall: No mass.   Breasts:     Right: Tenderness present. No swelling, bleeding, inverted nipple, mass, nipple discharge or skin change.      Left: Tenderness present. No swelling, bleeding, inverted nipple, mass, nipple discharge or skin change.          Comments: No masses, nodularity, skin changes, nipple changes or discharge, or adenopathy appreciated on physical exam.     Abdominal:      General: Abdomen is flat.      Palpations: Abdomen is soft.   Lymphadenopathy:      Upper Body:      Right upper body: No supraclavicular, axillary or pectoral adenopathy.      Left upper body: No supraclavicular, axillary or pectoral adenopathy.   Skin:     General: Skin is warm.   Neurological:      General: No focal deficit present.      Mental " Status: She is alert and oriented to person, place, and time.   Psychiatric:         Mood and Affect: Mood normal.         Behavior: Behavior normal.          No visits with results within 1 Month(s) from this visit.   Latest known visit with results is:   Office Visit on 11/15/2024   Component Date Value Ref Range Status   • RAPID FLU A 11/15/2024 Negative   Final   • RAPID FLU B 11/15/2024 Negative   Final

## 2025-02-24 NOTE — TELEPHONE ENCOUNTER
Return call to Saray, she feels she will still have heavy bleeding tomorrow and would like to r/s.    EMB r/s as requested.

## 2025-02-24 NOTE — TELEPHONE ENCOUNTER
Pt has EMB scheduled for 02/25/2025. Pt has period with a really heavy flow. Would like to know if provider wants her to r/s or if she is okay with continuing procedure.

## 2025-03-07 ENCOUNTER — PROCEDURE VISIT (OUTPATIENT)
Dept: OBGYN CLINIC | Facility: CLINIC | Age: 50
End: 2025-03-07
Payer: COMMERCIAL

## 2025-03-07 VITALS — BODY MASS INDEX: 22.96 KG/M2 | SYSTOLIC BLOOD PRESSURE: 118 MMHG | WEIGHT: 129.6 LBS | DIASTOLIC BLOOD PRESSURE: 70 MMHG

## 2025-03-07 DIAGNOSIS — N92.4 ABNORMAL PERIMENOPAUSAL BLEEDING: Primary | ICD-10-CM

## 2025-03-07 PROCEDURE — 58100 BIOPSY OF UTERUS LINING: CPT | Performed by: OBSTETRICS & GYNECOLOGY

## 2025-03-07 PROCEDURE — 88305 TISSUE EXAM BY PATHOLOGIST: CPT | Performed by: STUDENT IN AN ORGANIZED HEALTH CARE EDUCATION/TRAINING PROGRAM

## 2025-03-07 NOTE — PATIENT INSTRUCTIONS
Patient Education     Endometrial Biopsy   Why is this procedure done?   The uterus is the organ where a baby grows when you are pregnant. The uterus is also called the womb. You get rid of the lining of the uterus each time you have your period. The lining of your uterus is called the endometrium.  An endometrial biopsy is a procedure to get a sample of the lining of the uterus. A biopsy is done to:  Look for the cause of heavy bleeding  Help your doctor know if the tissue is cancer or not  Understand what may be causing problems getting pregnant  What will the results be?   The biopsy test result will tell your doctor if you have an illness. Then, your doctor will know if more treatment is needed. Your doctor may do other tests to go along with the biopsy.  What happens before the procedure?   Your doctor will ask you about your health history. Talk to your doctor about:  All the drugs you are taking. Be sure to include all prescription and over-the-counter (OTC) drugs, and herbal supplements. Tell the doctor about any drug allergy. Bring a list of drugs you take with you.  Any bleeding problems. Be sure to tell your doctor if you are taking any drugs that may cause bleeding. Some of these are warfarin, rivaroxaban, apixaban, ticagrelor, clopidogrel, ketorolac, ibuprofen, naproxen, or aspirin. Certain vitamins and herbs, such as garlic and fish oil, may also add to the risk for bleeding. You may need to stop these drugs as well. Talk to your doctor about them.  If you need to stop eating or drinking before your procedure.  If you think you might be pregnant.  Your doctor will do an exam and may order:  Lab tests  Ultrasound  You may not be allowed to drive after the procedure. Ask a family member or a friend to drive you home.  What happens during the procedure?   An endometrial biopsy may be done in the office or in the operating room. You may or may not be asleep for the biopsy.  You will lie on your back with  your feet in foot holders.  You may have a drug to make you sleepy. The drug will also help you stay pain free during the surgery. The staff may put an IV in your arm to give you fluids and drugs.  Your doctor will give you a drug to open your cervix. Then, the doctor will clean your vaginal area and an antiseptic will be applied.  A special tool called a speculum is put into your vagina. The speculum helps keep your vagina open so the doctor is able to see. A tube with a camera at the tip is gently moved through the cervix into your uterus. Your uterus may be filled with a harmless gas. Your doctor will look at your womb by looking at the monitor linked to the camera.  The doctor will put small tools through the tube. A sample of the lump or your womb's lining will be collected. Your doctor will send the samples to the lab for testing.  Your doctor will take all the tools out of the womb and clean your vagina.  Your doctor may leave sterile gauze packs inside your vagina. The gauze may be removed before you go home.  The procedure takes about 30 to 45 minutes if you were put to sleep. The procedure takes less time if you are awake.  What happens after the procedure?   You may go to the Recovery Room and the staff will watch you closely. You may have to stay in the hospital or your doctor's office for 2 to 4 hours.  You may have some cramping. Your doctor may give you drugs for cramping.  What care is needed at home?   Ask your doctor what you need to do when you go home. Make sure you ask questions if you do not understand what the doctor says. Asking questions will help you know what you need to do.  You need to limit your activity until your doctor says otherwise.  Ask your doctor if it is OK to use heat on your lower belly to help with pain. If so, put a heating pad on your belly for no more than 20 minutes at a time. Do not sleep with a heating pad. You may get burned.  You can expect some bleeding from your  vagina for a few days to a few weeks. You may use sanitary pads but not tampons or menstrual cups.  You may wash your vagina using soap and water. You may wash yourself 24 hours after the operation or when your doctor tells you. Proper washing will help prevent infection.  Your doctor may give you a drug called estrogen to help heal the lining of the womb. You may have to take estrogen for a few weeks. Take the drug as ordered by your doctor.  Do not have sexual intercourse or put anything in your vagina until your doctor says it is OK.  What follow-up care is needed?   Your doctor may ask you to make visits to the office to check on your progress. Be sure to keep your visits.  Talk to the doctor about when your test results will be available.  Your doctor may order another test to confirm your biopsy result.  What lifestyle changes are needed?   Rest for the first few days after the procedure. Avoid activities like heavy lifting and hard exercise. Ask your doctor when you may go back to your normal activities like work, driving, or sex.  What problems could happen?   Infection  Perforation of the uterus, bladder, or intestines  Cuts on the cervix  Bleeding  Too much thickening of the endometrium  Blood clots in your legs or lungs  Last Reviewed Date   2021-03-17  Consumer Information Use and Disclaimer   This generalized information is a limited summary of diagnosis, treatment, and/or medication information. It is not meant to be comprehensive and should be used as a tool to help the user understand and/or assess potential diagnostic and treatment options. It does NOT include all information about conditions, treatments, medications, side effects, or risks that may apply to a specific patient. It is not intended to be medical advice or a substitute for the medical advice, diagnosis, or treatment of a health care provider based on the health care provider's examination and assessment of a patient’s specific and  unique circumstances. Patients must speak with a health care provider for complete information about their health, medical questions, and treatment options, including any risks or benefits regarding use of medications. This information does not endorse any treatments or medications as safe, effective, or approved for treating a specific patient. [employer] and its affiliates disclaim any warranty or liability relating to this information or the use thereof. The use of this information is governed by the Terms of Use, available at https://www.[employer].com/en/know/clinical-effectiveness-terms   Copyright   Copyright © 2024 [employer] and its affiliates and/or licensors. All rights reserved.

## 2025-03-12 PROCEDURE — 88305 TISSUE EXAM BY PATHOLOGIST: CPT | Performed by: STUDENT IN AN ORGANIZED HEALTH CARE EDUCATION/TRAINING PROGRAM

## 2025-03-14 ENCOUNTER — RESULTS FOLLOW-UP (OUTPATIENT)
Dept: OBGYN CLINIC | Facility: CLINIC | Age: 50
End: 2025-03-14

## 2025-03-26 PROBLEM — Z08 ENCOUNTER FOR FOLLOW-UP EXAMINATION AFTER COMPLETED TREATMENT FOR MALIGNANT NEOPLASM: Status: RESOLVED | Noted: 2025-02-24 | Resolved: 2025-03-26

## 2025-06-10 DIAGNOSIS — Z17.0 MALIGNANT NEOPLASM OF LOWER-INNER QUADRANT OF LEFT BREAST IN FEMALE, ESTROGEN RECEPTOR POSITIVE (HCC): Primary | ICD-10-CM

## 2025-06-10 DIAGNOSIS — C50.312 MALIGNANT NEOPLASM OF LOWER-INNER QUADRANT OF LEFT BREAST IN FEMALE, ESTROGEN RECEPTOR POSITIVE (HCC): Primary | ICD-10-CM

## 2025-06-16 ENCOUNTER — OFFICE VISIT (OUTPATIENT)
Dept: HEMATOLOGY ONCOLOGY | Facility: CLINIC | Age: 50
End: 2025-06-16
Payer: COMMERCIAL

## 2025-06-16 VITALS
WEIGHT: 131.5 LBS | BODY MASS INDEX: 23.3 KG/M2 | HEIGHT: 63 IN | RESPIRATION RATE: 18 BRPM | TEMPERATURE: 98.1 F | DIASTOLIC BLOOD PRESSURE: 74 MMHG | OXYGEN SATURATION: 99 % | HEART RATE: 76 BPM | SYSTOLIC BLOOD PRESSURE: 114 MMHG

## 2025-06-16 DIAGNOSIS — C50.312 MALIGNANT NEOPLASM OF LOWER-INNER QUADRANT OF LEFT BREAST IN FEMALE, ESTROGEN RECEPTOR POSITIVE (HCC): Primary | ICD-10-CM

## 2025-06-16 DIAGNOSIS — Z17.0 MALIGNANT NEOPLASM OF LOWER-INNER QUADRANT OF LEFT BREAST IN FEMALE, ESTROGEN RECEPTOR POSITIVE (HCC): Primary | ICD-10-CM

## 2025-06-16 DIAGNOSIS — Z08 ENCOUNTER FOR FOLLOW-UP EXAMINATION AFTER COMPLETED TREATMENT FOR MALIGNANT NEOPLASM: ICD-10-CM

## 2025-06-16 PROCEDURE — 99215 OFFICE O/P EST HI 40 MIN: CPT | Performed by: INTERNAL MEDICINE

## 2025-06-16 NOTE — PROGRESS NOTES
Name: Saray Guo      : 1975      MRN: [id number]  Encounter Provider: Danica Sandoval MD  Encounter Date: 2025   Encounter department: [facility] [place]  :  Assessment & Plan  Malignant neoplasm of lower-inner quadrant of left breast in female, estrogen receptor positive (HCC)   Cancer Staging   Malignant neoplasm of lower-inner quadrant of left breast in female, estrogen receptor positive (HCC)  Staging form: Breast, AJCC 7th Edition  - Pathologic stage from 2013: Stage IA (T1c, N0, cM0) - Signed by GURVINDER Dennis on 2024  S/p lumpectomy and sentinel lymph node biopsy, followed by adjuvant chemotherapy, radiation and adjuvant endocrine treatment.  Completed 10 years of tamoxifen.  Currently on surveillance.  Continue yearly mammograms.  Mammograms are ordered by surgical oncology.  Continue to follow with surgical oncology.       Encounter for follow-up examination after completed treatment for malignant neoplasm  As above         Assessment & Plan  1. Breast Cancer Surveillance.  Diagnosed with breast cancer in , underwent surgery, chemotherapy, radiation, and completed 10 years of tamoxifen. Currently on surveillance with no new symptoms such as chest pain, nipple discharge, unintentional weight loss, or shortness of breath. Last mammogram was in 2024, with the next one scheduled for 2025. Follows up with surgical oncology. Advised to contact the office via in3Dgallery message or phone call if any new symptoms arise or if there are any questions.    2. Menstrual Irregularities.  Menstrual cycles have resumed and are heavy and irregular since stopping tamoxifen in 2024. A biopsy was performed by her gynecologist to ensure there are no abnormalities. Experiences heavy and prolonged periods, sometimes lasting almost 2 weeks.    Follow-up  Follow up in 1 year.      Return in about 1 year (around 2026) for Office  Visit.    History of Present Illness   Chief Complaint   Patient presents with    Follow-up     As per Dr. Bella's note:   HPI:  A 38 year old premenopausal woman, who noticed a lump in the medial lower aspect of her left breast in October 2013. She brought this to the medical attention. She was found to have abnormality based on imaging study. She underwent ultrasound-guided biopsy in October 22, 2013, which showed invasive mammary carcinoma, ER/OK positive, HER-2 negative disease. She also underwent right breast biopsy, as well as second biopsy of dislocation in the left breast. Both of which were negative for malignancy. She underwent lumpectomy with sentinel lymph node biopsy in November 19, 2013. Pathology revealed that 1.1 cm of invasive ductal carcinoma, grade 2. There is no evidence of lymphovascular invasion. 3 sentinel lymph node were all negative for metastatic disease. This was ER/OK positive, HER-2 negative disease. Her tumor tissue was sent for MammaPrint, which came back as high risk disease. Because of her young age, she underwent BRCA1/2 testing which showed no evidence of mutation. She has no family history of breast cancer or ovarian cancer.  Received adjuvant treatment with TC.  Completed 10 years of adjuvant tamoxifen.  History of Present Illness  The patient is a 49-year-old female with a history of breast cancer diagnosed in 2013, presenting today for follow-up and transition of care from Dr. Bella.    She was diagnosed with breast cancer at the age of 38 and underwent surgery performed by Dr. Torres, followed by chemotherapy and radiation. She completed a 10-year course of tamoxifen and is currently under surveillance. Her last mammogram was conducted in 06/2024, and she has a scheduled appointment with surgical oncology on 07/30/2025. She reports no new symptoms since her last consultation with Dr. Bella, including no chest pain, nipple discharge, unintentional weight loss, or shortness of  breath. She has not yet undergone a colonoscopy, but this has been discussed with her primary care physician. She reports no presence of any lumps in her breasts.    She has resumed her menstrual cycle since discontinuing tamoxifen in 2023, with regular monthly periods that are occasionally heavy and can last up to 2 weeks. A biopsy was performed by her gynecologist due to the heavy nature of her periods.    PAST SURGICAL HISTORY:  Breast cancer surgery performed by Dr. Torres.    SOCIAL HISTORY  She does not smoke. She drinks alcohol on weekends. She does not use any other drugs. She works for [employer]    FAMILY HISTORY  Her father had prostate cancer.  Oncology History   Cancer Staging   Malignant neoplasm of lower-inner quadrant of left breast in female, estrogen receptor positive (HCC)  Staging form: Breast, AJCC 7th Edition  - Pathologic stage from 11/19/2013: Stage IA (T1c, N0, cM0) - Signed by GURVINDER Dennis on 2/21/2024  Specimen type: Excision  Laterality: Left  Tumor size (mm): 11  Method of lymph node assessment: [place] lymph node biopsy  Histologic grade (G): G2  Estrogen receptor status: Positive  Percentage of positive estrogen receptors (%): 90  Progesterone receptor status: Positive  Percentage of positive progesterone receptors (%): 90  HER2 status: Negative  Oncology History   Malignant neoplasm of lower-inner quadrant of left breast in female, estrogen receptor positive (HCC)   11/6/2013 Genetic Testing    BRCA negative  [facility]     11/19/2013 Surgery    Left lumpectomy with SLN biopsy  Invasive ductal carcinoma  1.1 cm  Grade 2  0/3 lymph nodes  ER 90  WA 90  Her 2 1+  Stage IA     11/19/2013 -  Cancer Staged    Staging form: Breast, AJCC 7th Edition  - Pathologic stage from 11/19/2013: Stage IA (T1c, N0, cM0) - Signed by GURVINDER Dennis on 2/21/2024  Specimen type: Excision  Laterality: Left  Tumor size (mm): 11  Method of lymph node assessment: [place] lymph node  "biopsy  Histologic grade (G): G2  Estrogen receptor status: Positive  Percentage of positive estrogen receptors (%): 90  Progesterone receptor status: Positive  Percentage of positive progesterone receptors (%): 90  HER2 status: Negative       2013 Genomic Testing    Mammaprint high risk     2014 - 3/2014 Chemotherapy    Adjuvant chemotherapy with Taxotere and cyclophosphamide x4, cycle. Completed in 2014.      2014 -  Hormone Therapy    Tamoxifen 20 mg daily    10 years recommended  Dr. Weeks     2014 - 2014 Radiation    Entire left breast to 5040 cGy with an additional  1000 cGy to the lumpectomy cavity  Dr. Arriaza        Pertinent Medical History     25: reviewed     Review of Systems        Objective   Resp 18   Ht 5' 3\" (1.6 m)   LMP 2020   BMI 22.96 kg/m²     Pain Screenin    ECOG   0  Physical Exam  Vitals and nursing note reviewed.   Constitutional:       General: She is not in acute distress.     Appearance: Normal appearance.   HENT:      Head: Normocephalic and atraumatic.      Mouth/Throat:      Mouth: Mucous membranes are moist.      Pharynx: Oropharynx is clear.     Eyes:      General: No scleral icterus.     Extraocular Movements: Extraocular movements intact.      Conjunctiva/sclera: Conjunctivae normal.       Cardiovascular:      Rate and Rhythm: Normal rate and regular rhythm.      Pulses: Normal pulses.      Heart sounds: No murmur heard.  Pulmonary:      Effort: Pulmonary effort is normal.      Breath sounds: Normal breath sounds. No wheezing, rhonchi or rales.   Abdominal:      General: Bowel sounds are normal.      Palpations: Abdomen is soft.      Tenderness: There is no abdominal tenderness.     Musculoskeletal:         General: No swelling or tenderness. Normal range of motion.      Cervical back: Neck supple.   Lymphadenopathy:      Cervical: No cervical adenopathy.     Skin:     General: Skin is warm and dry.      Coloration: Skin is not " "pale.      Findings: No bruising, erythema or rash.     Neurological:      General: No focal deficit present.      Mental Status: She is alert and oriented to person, place, and time.      Motor: No weakness.     Psychiatric:         Mood and Affect: Mood normal.         Behavior: Behavior normal.       Physical Exam  Breast: No lumps noted on breast examination.  Hematologic/Lymphatic: No lymphadenopathy noted in the neck.    Results    Labs: I have reviewed the following labs:  No results found for: \"WBC\", \"RBC\", \"HGB\", \"HCT\", \"MCV\", \"MCH\", \"RDW\", \"PLT\", \"NEUTOPHILPCT\", \"LYMPHOPCT\", \"MONOPCT\", \"EOSPCT\", \"BASOPCT\", \"IMMGRANS\", \"NEUTROABS\"  No results found for: \"NA\", \"K\", \"CL\", \"CO2\", \"ANIONGAP\", \"BUN\", \"CREATININE\", \"GLUCOSE\", \"GLUF\", \"CALCIUM\", \"CORRECTEDCA\", \"AST\", \"ALT\", \"ALKPHOS\", \"TP\", \"ALB\", \"TBILI\", \"EGFR\"          Disclaimer: This document was prepared using [employer]. If a word or phrase is confusing, or does not make sense, this is likely due to recognition error which was not discovered during this clinician's review. If you believe an error has occurred, please contact me through [employer] service line for clive?cation.      Follow Up    All questions were answered to the patient's satisfaction during this encounter. The patient knows the contact information for our office and knows to reach out for any relevant concerns related to this encounter. They are to call for any temperature 100.4 or higher, new symptoms including but not restricted to shaking chills, decreased appetite, nausea, vomiting, diarrhea, increased fatigue, shortness of breath or chest pain, confusion, and not feeling the strength to come to the clinic. For all other listed problems and medical diagnosis in their chart - they are managed by PCP and/or other specialists, which the patient acknowledges.     I spent 40 minutes reviewing the records (labs, clinician notes, outside records, medical history, " ordering medicine/tests/procedures, monitoring of anti-neoplastic toxicities, interpreting the imaging/labs previously done) and coordination of care as well as direct time with the patient today, of which greater than 50% of the time was spent in counseling and coordination of care with the patient/family.

## 2025-06-16 NOTE — ASSESSMENT & PLAN NOTE
Cancer Staging   Malignant neoplasm of lower-inner quadrant of left breast in female, estrogen receptor positive (HCC)  Staging form: Breast, AJCC 7th Edition  - Pathologic stage from 11/19/2013: Stage IA (T1c, N0, cM0) - Signed by GURVINDER Dennis on 2/21/2024  S/p lumpectomy and sentinel lymph node biopsy, followed by adjuvant chemotherapy, radiation and adjuvant endocrine treatment.  Completed 10 years of tamoxifen.  Currently on surveillance.  Continue yearly mammograms.  Mammograms are ordered by surgical oncology.  Continue to follow with surgical oncology.

## 2025-07-30 ENCOUNTER — TELEPHONE (OUTPATIENT)
Age: 50
End: 2025-07-30

## 2025-07-30 DIAGNOSIS — N92.4 ABNORMAL PERIMENOPAUSAL BLEEDING: Primary | ICD-10-CM

## 2025-07-31 ENCOUNTER — HOSPITAL ENCOUNTER (OUTPATIENT)
Dept: ULTRASOUND IMAGING | Facility: CLINIC | Age: 50
Discharge: HOME | End: 2025-07-31
Attending: OBSTETRICS & GYNECOLOGY
Payer: COMMERCIAL

## 2025-07-31 ENCOUNTER — HOSPITAL ENCOUNTER (OUTPATIENT)
Dept: MAMMOGRAPHY | Facility: CLINIC | Age: 50
Discharge: HOME | End: 2025-07-31
Attending: OBSTETRICS & GYNECOLOGY
Payer: COMMERCIAL

## 2025-07-31 DIAGNOSIS — R92.333 HETEROGENEOUSLY DENSE TISSUE OF BOTH BREASTS ON MAMMOGRAPHY: ICD-10-CM

## 2025-07-31 DIAGNOSIS — Z12.31 ENCOUNTER FOR SCREENING MAMMOGRAM FOR BREAST CANCER: ICD-10-CM

## 2025-07-31 PROCEDURE — 76641 ULTRASOUND BREAST COMPLETE: CPT

## 2025-07-31 PROCEDURE — 77067 SCR MAMMO BI INCL CAD: CPT

## 2025-07-31 PROCEDURE — 77063 BREAST TOMOSYNTHESIS BI: CPT

## 2025-08-01 RX ORDER — TRANEXAMIC ACID 650 MG/1
1300 TABLET ORAL 2 TIMES DAILY
Qty: 10 TABLET | Refills: 0 | Status: SHIPPED | OUTPATIENT
Start: 2025-08-01 | End: 2025-08-06

## (undated) DEVICE — TUBING SUCTION 5MM X 12 FT

## (undated) DEVICE — GAUZE SPONGES,USP TYPE VII GAUZE, 12 PLY: Brand: CURITY

## (undated) DEVICE — PAD GROUNDING ADULT

## (undated) DEVICE — 3M™ TEGADERM™ TRANSPARENT FILM DRESSING FRAME STYLE, 1624W, 2-3/8 IN X 2-3/4 IN (6 CM X 7 CM), 100/CT 4CT/CASE: Brand: 3M™ TEGADERM™

## (undated) DEVICE — POOLE SUCTION HANDLE: Brand: CARDINAL HEALTH

## (undated) DEVICE — NEEDLE 25G X 1 1/2

## (undated) DEVICE — 3M™ STERI-STRIP™ REINFORCED ADHESIVE SKIN CLOSURES, R1546, 1/4 IN X 4 IN (6 MM X 100 MM), 10 STRIPS/ENVELOPE: Brand: 3M™ STERI-STRIP™

## (undated) DEVICE — HYDROPHILIC WOUND DRESSING WITH ZINC PLUS VITAMINS A AND B6.: Brand: DERMAGRAN®-B

## (undated) DEVICE — 3M™ TEGADERM™ TRANSPARENT FILM DRESSING FRAME STYLE, 1626W, 4 IN X 4-3/4 IN (10 CM X 12 CM), 50/CT 4CT/CASE: Brand: 3M™ TEGADERM™

## (undated) DEVICE — CHLORAPREP HI-LITE 26ML ORANGE

## (undated) DEVICE — INTENDED FOR TISSUE SEPARATION, AND OTHER PROCEDURES THAT REQUIRE A SHARP SURGICAL BLADE TO PUNCTURE OR CUT.: Brand: BARD-PARKER SAFETY BLADES SIZE 15, STERILE

## (undated) DEVICE — GLOVE SRG BIOGEL ECLIPSE 7.5

## (undated) DEVICE — PLUMEPEN PRO 10FT

## (undated) DEVICE — LIGHT HANDLE COVER SLEEVE DISP BLUE STELLAR

## (undated) DEVICE — MINOR PROCEDURE DRAPE: Brand: CONVERTORS

## (undated) DEVICE — SCD SEQUENTIAL COMPRESSION COMFORT SLEEVE MEDIUM KNEE LENGTH: Brand: KENDALL SCD

## (undated) DEVICE — GLOVE INDICATOR PI UNDERGLOVE SZ 7.5 BLUE

## (undated) DEVICE — DRAPE EQUIPMENT RF WAND

## (undated) DEVICE — BETHLEHEM UNIVERSAL OUTPATIENT: Brand: CARDINAL HEALTH